# Patient Record
Sex: MALE | Race: ASIAN | NOT HISPANIC OR LATINO | Employment: OTHER | URBAN - METROPOLITAN AREA
[De-identification: names, ages, dates, MRNs, and addresses within clinical notes are randomized per-mention and may not be internally consistent; named-entity substitution may affect disease eponyms.]

---

## 2017-03-03 ENCOUNTER — APPOINTMENT (OUTPATIENT)
Dept: LAB | Facility: CLINIC | Age: 67
End: 2017-03-03
Payer: COMMERCIAL

## 2017-03-03 ENCOUNTER — TRANSCRIBE ORDERS (OUTPATIENT)
Dept: LAB | Facility: CLINIC | Age: 67
End: 2017-03-03

## 2017-03-03 DIAGNOSIS — R94.6 NONSPECIFIC ABNORMAL RESULTS OF THYROID FUNCTION STUDY: ICD-10-CM

## 2017-03-03 DIAGNOSIS — E11.9 DIABETES MELLITUS WITHOUT COMPLICATION (HCC): ICD-10-CM

## 2017-03-03 DIAGNOSIS — R94.6 NONSPECIFIC ABNORMAL RESULTS OF THYROID FUNCTION STUDY: Primary | ICD-10-CM

## 2017-03-03 LAB
ALBUMIN SERPL BCP-MCNC: 4.1 G/DL (ref 3.5–5)
ALP SERPL-CCNC: 64 U/L (ref 46–116)
ALT SERPL W P-5'-P-CCNC: 38 U/L (ref 12–78)
ANION GAP SERPL CALCULATED.3IONS-SCNC: 11 MMOL/L (ref 4–13)
AST SERPL W P-5'-P-CCNC: 31 U/L (ref 5–45)
BILIRUB SERPL-MCNC: 0.5 MG/DL (ref 0.2–1)
BUN SERPL-MCNC: 15 MG/DL (ref 5–25)
CALCIUM SERPL-MCNC: 9.8 MG/DL (ref 8.3–10.1)
CHLORIDE SERPL-SCNC: 102 MMOL/L (ref 100–108)
CHOLEST SERPL-MCNC: 192 MG/DL (ref 50–200)
CO2 SERPL-SCNC: 27 MMOL/L (ref 21–32)
CREAT SERPL-MCNC: 0.88 MG/DL (ref 0.6–1.3)
CREAT UR-MCNC: 50.6 MG/DL
EST. AVERAGE GLUCOSE BLD GHB EST-MCNC: 134 MG/DL
GFR SERPL CREATININE-BSD FRML MDRD: >60 ML/MIN/1.73SQ M
GLUCOSE SERPL-MCNC: 79 MG/DL (ref 65–140)
HBA1C MFR BLD: 6.3 % (ref 4.2–6.3)
HDLC SERPL-MCNC: 72 MG/DL (ref 40–60)
LDLC SERPL CALC-MCNC: 102 MG/DL (ref 0–100)
MICROALBUMIN UR-MCNC: 59.6 MG/L (ref 0–20)
MICROALBUMIN/CREAT 24H UR: 118 MG/G CREATININE (ref 0–30)
POTASSIUM SERPL-SCNC: 4.2 MMOL/L (ref 3.5–5.3)
PROT SERPL-MCNC: 7.7 G/DL (ref 6.4–8.2)
SODIUM SERPL-SCNC: 140 MMOL/L (ref 136–145)
T3FREE SERPL-MCNC: 2.69 PG/ML (ref 2.3–4.2)
T4 FREE SERPL-MCNC: 0.92 NG/DL (ref 0.76–1.46)
TRIGL SERPL-MCNC: 89 MG/DL
TSH SERPL DL<=0.05 MIU/L-ACNC: 6.3 UIU/ML (ref 0.36–3.74)

## 2017-03-03 PROCEDURE — 86376 MICROSOMAL ANTIBODY EACH: CPT

## 2017-03-03 PROCEDURE — 84443 ASSAY THYROID STIM HORMONE: CPT

## 2017-03-03 PROCEDURE — 36415 COLL VENOUS BLD VENIPUNCTURE: CPT

## 2017-03-03 PROCEDURE — 84481 FREE ASSAY (FT-3): CPT

## 2017-03-03 PROCEDURE — 80053 COMPREHEN METABOLIC PANEL: CPT

## 2017-03-03 PROCEDURE — 83036 HEMOGLOBIN GLYCOSYLATED A1C: CPT

## 2017-03-03 PROCEDURE — 84439 ASSAY OF FREE THYROXINE: CPT

## 2017-03-03 PROCEDURE — 82043 UR ALBUMIN QUANTITATIVE: CPT | Performed by: INTERNAL MEDICINE

## 2017-03-03 PROCEDURE — 80061 LIPID PANEL: CPT

## 2017-03-03 PROCEDURE — 82570 ASSAY OF URINE CREATININE: CPT | Performed by: INTERNAL MEDICINE

## 2017-03-04 ENCOUNTER — GENERIC CONVERSION - ENCOUNTER (OUTPATIENT)
Dept: OTHER | Facility: OTHER | Age: 67
End: 2017-03-04

## 2017-03-04 LAB — THYROPEROXIDASE AB SERPL-ACNC: 17 IU/ML (ref 0–34)

## 2017-03-05 ENCOUNTER — GENERIC CONVERSION - ENCOUNTER (OUTPATIENT)
Dept: OTHER | Facility: OTHER | Age: 67
End: 2017-03-05

## 2017-03-07 ENCOUNTER — ALLSCRIPTS OFFICE VISIT (OUTPATIENT)
Dept: OTHER | Facility: OTHER | Age: 67
End: 2017-03-07

## 2017-04-28 ENCOUNTER — APPOINTMENT (OUTPATIENT)
Dept: LAB | Facility: CLINIC | Age: 67
End: 2017-04-28
Payer: COMMERCIAL

## 2017-04-28 DIAGNOSIS — E03.9 HYPOTHYROIDISM: ICD-10-CM

## 2017-04-28 LAB — TSH SERPL DL<=0.05 MIU/L-ACNC: 3.22 UIU/ML (ref 0.36–3.74)

## 2017-04-28 PROCEDURE — 84443 ASSAY THYROID STIM HORMONE: CPT

## 2017-04-28 PROCEDURE — 36415 COLL VENOUS BLD VENIPUNCTURE: CPT

## 2017-05-01 DIAGNOSIS — E03.9 HYPOTHYROIDISM: ICD-10-CM

## 2017-05-30 ENCOUNTER — ALLSCRIPTS OFFICE VISIT (OUTPATIENT)
Dept: OTHER | Facility: OTHER | Age: 67
End: 2017-05-30

## 2017-06-07 ENCOUNTER — ALLSCRIPTS OFFICE VISIT (OUTPATIENT)
Dept: OTHER | Facility: OTHER | Age: 67
End: 2017-06-07

## 2017-07-19 ENCOUNTER — HOSPITAL ENCOUNTER (OUTPATIENT)
Dept: CT IMAGING | Facility: HOSPITAL | Age: 67
Discharge: HOME | End: 2017-07-19
Attending: INTERNAL MEDICINE
Payer: COMMERCIAL

## 2017-07-19 DIAGNOSIS — J47.9 BRONCHIECTASIS WITHOUT COMPLICATION (HCC): ICD-10-CM

## 2017-07-19 PROCEDURE — 71250 CT THORAX DX C-: CPT

## 2017-08-31 ENCOUNTER — TRANSCRIBE ORDERS (OUTPATIENT)
Dept: LAB | Facility: CLINIC | Age: 67
End: 2017-08-31

## 2017-08-31 ENCOUNTER — APPOINTMENT (OUTPATIENT)
Dept: LAB | Facility: CLINIC | Age: 67
End: 2017-08-31
Payer: COMMERCIAL

## 2017-08-31 DIAGNOSIS — E11.8 TYPE 2 DIABETES MELLITUS WITH COMPLICATION, UNSPECIFIED LONG TERM INSULIN USE STATUS: Primary | ICD-10-CM

## 2017-08-31 DIAGNOSIS — R01.1 HEART MURMUR: ICD-10-CM

## 2017-08-31 DIAGNOSIS — E11.8 TYPE 2 DIABETES MELLITUS WITH COMPLICATION, UNSPECIFIED LONG TERM INSULIN USE STATUS: ICD-10-CM

## 2017-08-31 LAB
ALBUMIN SERPL BCP-MCNC: 3.8 G/DL (ref 3.5–5)
ALP SERPL-CCNC: 55 U/L (ref 46–116)
ALT SERPL W P-5'-P-CCNC: 38 U/L (ref 12–78)
ANION GAP SERPL CALCULATED.3IONS-SCNC: 9 MMOL/L (ref 4–13)
AST SERPL W P-5'-P-CCNC: 34 U/L (ref 5–45)
BILIRUB SERPL-MCNC: 0.3 MG/DL (ref 0.2–1)
BUN SERPL-MCNC: 23 MG/DL (ref 5–25)
CALCIUM SERPL-MCNC: 9.5 MG/DL (ref 8.3–10.1)
CHLORIDE SERPL-SCNC: 104 MMOL/L (ref 100–108)
CHOLEST SERPL-MCNC: 156 MG/DL (ref 50–200)
CO2 SERPL-SCNC: 28 MMOL/L (ref 21–32)
CREAT SERPL-MCNC: 0.83 MG/DL (ref 0.6–1.3)
CREAT UR-MCNC: 107 MG/DL
EST. AVERAGE GLUCOSE BLD GHB EST-MCNC: 131 MG/DL
GFR SERPL CREATININE-BSD FRML MDRD: 90 ML/MIN/1.73SQ M
GLUCOSE P FAST SERPL-MCNC: 70 MG/DL (ref 65–99)
HBA1C MFR BLD: 6.2 % (ref 4.2–6.3)
HDLC SERPL-MCNC: 61 MG/DL (ref 40–60)
LDLC SERPL CALC-MCNC: 85 MG/DL (ref 0–100)
MICROALBUMIN UR-MCNC: 88.7 MG/L (ref 0–20)
MICROALBUMIN/CREAT 24H UR: 83 MG/G CREATININE (ref 0–30)
POTASSIUM SERPL-SCNC: 4.2 MMOL/L (ref 3.5–5.3)
PROT SERPL-MCNC: 7.5 G/DL (ref 6.4–8.2)
SODIUM SERPL-SCNC: 141 MMOL/L (ref 136–145)
TRIGL SERPL-MCNC: 50 MG/DL

## 2017-08-31 PROCEDURE — 82570 ASSAY OF URINE CREATININE: CPT | Performed by: INTERNAL MEDICINE

## 2017-08-31 PROCEDURE — 82043 UR ALBUMIN QUANTITATIVE: CPT | Performed by: INTERNAL MEDICINE

## 2017-08-31 PROCEDURE — 80053 COMPREHEN METABOLIC PANEL: CPT

## 2017-08-31 PROCEDURE — 36415 COLL VENOUS BLD VENIPUNCTURE: CPT

## 2017-08-31 PROCEDURE — 80061 LIPID PANEL: CPT

## 2017-08-31 PROCEDURE — 83036 HEMOGLOBIN GLYCOSYLATED A1C: CPT

## 2017-09-11 ENCOUNTER — ALLSCRIPTS OFFICE VISIT (OUTPATIENT)
Dept: OTHER | Facility: OTHER | Age: 67
End: 2017-09-11

## 2017-09-13 ENCOUNTER — ALLSCRIPTS OFFICE VISIT (OUTPATIENT)
Dept: OTHER | Facility: OTHER | Age: 67
End: 2017-09-13

## 2017-09-13 ENCOUNTER — TRANSCRIBE ORDERS (OUTPATIENT)
Dept: ADMINISTRATIVE | Facility: HOSPITAL | Age: 67
End: 2017-09-13

## 2017-09-13 DIAGNOSIS — J47.9 BRONCHIECTASIS WITHOUT COMPLICATION (HCC): ICD-10-CM

## 2017-09-13 DIAGNOSIS — R93.89 ABNORMAL RADIOLOGICAL FINDINGS IN SKIN AND SUBCUTANEOUS TISSUE: Primary | ICD-10-CM

## 2017-09-13 DIAGNOSIS — R93.89 ABNORMAL FINDINGS ON DIAGNOSTIC IMAGING OF OTHER SPECIFIED BODY STRUCTURES: ICD-10-CM

## 2017-09-28 ENCOUNTER — GENERIC CONVERSION - ENCOUNTER (OUTPATIENT)
Dept: OTHER | Facility: OTHER | Age: 67
End: 2017-09-28

## 2017-09-28 ENCOUNTER — HOSPITAL ENCOUNTER (OUTPATIENT)
Dept: NON INVASIVE DIAGNOSTICS | Facility: CLINIC | Age: 67
Discharge: HOME | End: 2017-09-28
Payer: COMMERCIAL

## 2017-09-28 DIAGNOSIS — R01.1 HEART MURMUR: ICD-10-CM

## 2017-09-28 PROCEDURE — 93306 TTE W/DOPPLER COMPLETE: CPT

## 2017-09-29 ENCOUNTER — ALLSCRIPTS OFFICE VISIT (OUTPATIENT)
Dept: OTHER | Facility: OTHER | Age: 67
End: 2017-09-29

## 2017-10-26 NOTE — PROGRESS NOTES
Assessment  1  DMII (diabetes mellitus, type 2) (250 00) (E11 9)   2  Hyperlipidemia (272 4) (E78 5)   3  Hypertension (401 9) (I10)   4  Hypothyroidism (244 9) (E03 9)   5  Mitral valve insufficiency (424 0) (I34 0)   6  Colon cancer screening (V76 51) (Z12 11)    Assessment plan #1 type 2 diabetes optimal control continue with LC #reducing carbohydrates and sweets will check a comprehensive metabolic panel, hemoglobin A1c, lipid panel and urine microalbumin  #2 hyperlipidemia recommend low-cholesterol diet will check a lipid profile currently optimal #3 hypertension well controlled to continue with the current medical regimen will continue to monitor #4 hypothyroidism checked her generation TSH #5 mitral valve insufficiency he does have a 3/6 systolic ejection murmur which check echocardiogram and have the patient follow up with cardiology #6 patient will receive his high-dose flu vaccine today RTO in 6 months call if any problems  Plan  Colon cancer screening    · 2 - Crissy Marx MD, Vineet Burt  Colorectal Surgery, Gastroenterology Co-Management  *  Status: Hold For  - Scheduling  Requested for: 11Sep2017  Care Summary provided  : Yes  DMII (diabetes mellitus, type 2)    · (1) COMPREHENSIVE METABOLIC PANEL; Status:Active; Requested for:11Mar2018;    · (1) HEMOGLOBIN A1C; Status:Active; Requested for:11Mar2018;    · (1) LIPID PANEL, FASTING; Status:Active; Requested for:11Mar2018;    · (1) MICROALBUMIN CREATININE RATIO, RANDOM URINE; Status:Active; Requested for:11Mar2018;    · *VB - Foot Exam; Status:Complete - Retrospective By Protocol Authorization;   Done: 57IFP5333  01:22PM  Erectile dysfunction of non-organic origin    · Cialis 20 MG Oral Tablet; TAKE 1 TABLET BY MOUTH EVERY 3 DAYS AS NEEDED  Hypothyroidism    · (Q) TSH, 3RD GENERATION; Status:Active;  Requested for:11Mar2018;   Mitral valve insufficiency    · ECHO COMPLETE WITH CONTRAST IF INDICATED; Status:Need Information - Financial Authorization; Requested for:46Abq0334;    · 1 - Parth VILLANUEAV, Gilma Carrillo  (Cardiology) Co-Management  *  Status: Active  Requested for:  38Qri7647  Care Summary provided  : Yes  Screening for colon cancer    · COLONOSCOPY; Status:Temporary Deferral - Pt refuses;   Screening for genitourinary condition    · *VB - Urinary Incontinence Screen (Dx Z13 89 Screen for UI); Status:Complete - Retrospective By  Protocol Authorization;   Done: 57NNJ0187 01:23PM    Chief Complaint  Chief Complaint Chronic Condition [facility]: Patient is here today for follow up of chronic conditions described in HPI  History of Present Illness  HPI: 57-year-old male coming in for a follow-up examination type 2 diabetes, hyperlipidemia, hypertension and hypothyroidism; the patient reports to me that He does not report any hospitalizations, no ER visits he is compliant taking his medications without untoward side effects ;blood bp 117-115/75 ; 130 -115 blood sugars no hypoglycemia ; eating carefully, very active with home activities  Review of Systems  Complete-Male:   Constitutional: No fever or chills, feels well, no tiredness, no recent weight gain or weight loss  Cardiovascular: No complaints of slow heart rate, no fast heart rate, no chest pain, no palpitations, no leg claudication, no lower extremity  Respiratory: No complaints of shortness of breath, no wheezing, no cough, no SOB on exertion, no orthopnea or PND  Gastrointestinal: No complaints of abdominal pain, no constipation, no nausea or vomiting, no diarrhea or bloody stools  Genitourinary: No complaints of dysuria, no incontinence, no hesitancy, no nocturia, no genital lesion, no testicular pain  Preventive Quality 65 Older:   Preventive Quality 65 and Older: Falls Risk: The patient fell 0 times in the past 12 months  The patient currently has no urinary incontinence symptoms  ROS Reviewed:   ROS reviewed        Active Problems  1  3-vessel coronary artery disease (414 00) (I25 10)   2  Anxiety (300 00) (F41 9)   3  Bronchiectasis without complication (596 4) (A56 8)   4  CABG   5  Cardiac murmur (785 2) (R01 1)   6  Cataract (366 9) (H26 9)   7  Contact dermatitis (692 9) (L25 9)   8  Cough (786 2) (R05)   9  DMII (diabetes mellitus, type 2) (250 00) (E11 9)   10  Elevated TSH (794 5) (R94 6)   11  Erectile dysfunction of non-organic origin (302 72) (F52 21)   12  Gait disturbance (781 2) (R26 9)   13  Gall bladder polyp (575 6) (K82 4)   14  Hyperlipidemia (272 4) (E78 5)   15  Hypertension (401 9) (I10)   16  Hypothyroidism (244 9) (E03 9)   17  Microalbuminuria (791 0) (R80 9)   18  Mitral valve insufficiency (424 0) (I34 0)   19  Need for immunization against influenza (V04 81) (Z23)   20  Needs flu shot (V04 81) (Z23)   21  Post-polio syndrome (138) (G14)   22  Thoracic back pain (724 1) (M54 6)   23  Type 2 diabetes mellitus, controlled, 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)    Past Medical History  1  History of Community acquired pneumonia (5) (J18 9)   2  History of backache (V13 59) (Z87 39)   3  History of Methicillin Resistant Staphylococcus Aureus Infection (V12 04)   4  History of Nonspecific abnormal results of function study of thyroid (794 5) (R94 6)    Surgical History  1  CABG   2  History of Heart Surgery  Surgical History Reviewed: The surgical history was reviewed and updated today  Family History  Mother    1  No pertinent family history  Family History    2  Family history of Diabetes Mellitus (V18 0)  Family History Reviewed: The family history was reviewed and updated today  Social History   · Denied: History of Alcohol Use (History)   · Denied: History of Drug Use   · Exercising Regularly   · Former smoker (V15 82) (K19 629)   · Living With Significant Other   ·    · Never A Smoker   · Retired From Work  Social History Reviewed: The social history was reviewed and updated today   The social history was reviewed and is unchanged  Current Meds   1  Aspirin  MG Oral Tablet Delayed Release; Take 1 tablet daily; Therapy: 91AFY3341 to (Evaluate:44Tkv2932); Last CA:19CGZ1149 Ordered   2  Basaglar KwikPen 100 UNIT/ML Subcutaneous Solution Pen-injector; inject 20 units before bedtime; Therapy: 10QDQ6111 to (Evaluate:91Urq7239)  Requested for: 70INU3328; Last Rx:39Son6060   Ordered   3  Pastor Contour Test In Vitro Strip; TEST ONCE DAILY; Therapy: 35FUM3979 to (Evaluate:87Vzu5160)  Requested for: 47RWH5685; Last Rx:19Kjl7759   Ordered   4  BD Pen Needle Mini U/F 31G X 5 MM Miscellaneous; use with lantus solostar as directed; Therapy: 58HWZ8258 to (Last Rx:74Yxn0924)  Requested for: 87Icu6862 Ordered   5  Benzonatate 100 MG Oral Capsule; TAKE 1 CAPSULE 3 TIMES DAILY AS NEEDED; Therapy: 65VXN3235 to (Bettye Nair)  Requested for: 30Slp8751; Last Rx:44Ujo1551   Ordered   6  Cialis 20 MG Oral Tablet; TAKE 1 TABLET BY MOUTH EVERY 3 DAYS AS NEEDED; Therapy: 89BKI5558 to (Jojo Roldan)  Requested for: 09Vyi5861; Last Rx:84Cja5349   Ordered   7  DiazePAM 5 MG Oral Tablet; TAKE 1 TABLET 3 TIMES DAILY AS NEEDED; Last Rx:22Nov2016   Ordered   8  DilTIAZem HCl  MG Oral Capsule Extended Release 24 Hour; take 1 capsule daily; Therapy: 13Szt8861 to (Evaluate:25Ajj0261)  Requested for: 35UNT6548; Last Rx:62Dty5795   Ordered   9  DilTIAZem HCl ER Coated Beads 240 MG Oral Capsule Extended Release 24 Hour; TAKE ONE   CAPSULE BY MOUTH EVERY DAY; Therapy: 87WCT6572 to (Jojo Roldan)  Requested for: 97KOF8730; Last Rx:61Ysg9401   Ordered   10  Fexofenadine HCl - 180 MG Oral Tablet; TAKE 1 TABLET DAILY AS NEEDED FOR ALLERGIES; Therapy: 21QDC5183 to (Evaluate:98Lul7946)  Requested for: 40KHT8550; Last Rx:97Iag8484    Ordered   11  Fish Oil 1000 MG Oral Capsule; take 1 capsule daily; Therapy: (Recorded:44Snv5165) to Recorded   12  Levothyroxine Sodium 25 MCG Oral Tablet; TAKE 1 TABLET DAILY;     Therapy: 11YBS8161 to (HGNSRXYT:27MPT3931)  Requested for: 58HZO3318; Last Rx:67Qav8246    Ordered   13  Losartan Potassium 50 MG Oral Tablet; TAKE 1 TABLET DAILY; Therapy: 58YUC2104 to (26 126310)  Requested for: 02Plh0790; Last Rx:01Mar2016;    Status: ACTIVE - Renewal Denied Ordered   14  Losartan Potassium 50 MG Oral Tablet; Take 1 tablet daily; Therapy: 75Fnb6376 to (Evaluate:78Sgf3322)  Requested for: 56WJU8662; Last Rx:52Vml9031    Ordered   15  MetFORMIN HCl  MG Oral Tablet Extended Release 24 Hour; take 1 tablet by mouth twice a    day; Therapy: 23VNW1996 to (Evaluate:50Fzv0238)  Requested for: 32SEG3918; Last Rx:05Jan2017    Ordered   16  PredniSONE 5 MG Oral Tablet; 4 tablets per day x3 days,3 tablets a day x2 days, 2 tablets a day x2    days and one tablet a day for one day; Therapy: 58KIT0698 to (Evaluate:22Jun2017)  Requested for: 12Jun2017; Last Rx:12Jun2017    Ordered   17  Simvastatin 40 MG Oral Tablet; TD AFTER EVENING MEAL; Therapy: 05FUU9515 to (Evaluate:17Nov2017)  Requested for: 77IUE2401; Last Rx:22Nov2016    Ordered   18  Vitamin B Complex Oral Tablet; TAKE 1 TABLET DAILY; Therapy: (Recorded:11Sep2017) to Recorded   19  Vitamin C 500 MG Oral Capsule; take 1 capsule daily; Therapy: (Recorded:11Sep2017) to Recorded  Medication List Reviewed: The medication list was reviewed and updated today  Allergies  1  atorvastatin   2  Levaquin TABS   3  Pravachol TABS    Vitals  Vital Signs    Recorded: 11Sep2017 01:19PM   Heart Rate 70   Respiration 16   Systolic 693, RUE, Sitting   Diastolic 68, RUE, Sitting   Height 5 ft 4 in   Weight 117 lb 0 2 oz   BMI Calculated 20 09   BSA Calculated 1 56   O2 Saturation 92     Physical Exam    Constitutional   General appearance: No acute distress, well appearing and well nourished  Eyes   Conjunctiva and lids: No swelling, erythema, or discharge  Pupils and irises: Equal, round and reactive to light      Ears, Nose, Mouth, and Throat   External inspection of ears and nose: Normal     Otoscopic examination: Tympanic membrance translucent with normal light reflex  Canals patent without erythema  Nasal mucosa, septum, and turbinates: Normal without edema or erythema  Oropharynx: Normal with no erythema, edema, exudate or lesions  Pulmonary   Respiratory effort: No increased work of breathing or signs of respiratory distress  Auscultation of lungs: Clear to auscultation, equal breath sounds bilaterally, no wheezes, no rales, no rhonci  Cardiovascular   Auscultation of heart: Abnormal   A grade 3 systolic murmur was heard at the LLSB  Examination of extremities for edema and/or varicosities: Normal     Psychiatric   Mood and affect: Normal          Results/Data  Falls Risk Assessment (Dx Z13 89 Screen for Neurologic Disorder) 14SNC4832 01:24PM User, Ahs     Test Name Result Flag Reference   Falls Risk      No falls in the past year     *VB - Urinary Incontinence Screen (Dx Z13 89 Screen for UI) 88SNT4320 01:23PM [place]     Test Name Result Flag Reference   Urinary Incontinence Assessment 46Bfy1514       *VB - Foot Exam 21Kby8674 01:22PM [place]     Test Name Result Flag Reference   FOOT EXAM 51GIE2070       (1) COMPREHENSIVE METABOLIC PANEL 60CBJ9095 52:31PQ [place]     Test Name Result Flag Reference   SODIUM 141 mmol/L  136-145   POTASSIUM 4 2 mmol/L  3 5-5 3   CHLORIDE 104 mmol/L  100-108   CARBON DIOXIDE 28 mmol/L  21-32   ANION GAP (CALC) 9 mmol/L  4-13   BLOOD UREA NITROGEN 23 mg/dL  5-25   CREATININE 0 83 mg/dL  0 60-1 30   Standardized to IDMS reference method   CALCIUM 9 5 mg/dL  8 3-10 1   BILI, TOTAL 0 30 mg/dL  0 20-1 00   ALK PHOSPHATAS 55 U/L     ALT (SGPT) 38 U/L  12-78   Specimen collection should occur prior to Sulfasalazine administration due to the potential for falsely depressed results     AST(SGOT) 34 U/L  5-45   Specimen collection should occur prior to Sulfasalazine administration due to the potential for falsely depressed results  ALBUMIN 3 8 g/dL  3 5-5 0   TOTAL PROTEIN 7 5 g/dL  6 4-8 2   eGFR 90 ml/min/1 73sq m     National Kidney Disease Education Program recommendations are as follows:  GFR calculation is accurate only with a steady state creatinine  Chronic Kidney disease less than 60 ml/min/1 73 sq  meters  Kidney failure less than 15 ml/min/1 73 sq  meters  GLUCOSE FASTING 70 mg/dL  65-99   Specimen collection should occur prior to Sulfasalazine administration due to the potential for falsely depressed results  Specimen collection should occur prior to Sulfapyridine administration due to the potential for falsely elevated results  (1) LIPID PANEL, FASTING 75Vjd2238 12:51PM [facility] [place]     Test Name Result Flag Reference   CHOLESTEROL 156 mg/dL     HDL,DIRECT 61 mg/dL H 40-60   Specimen collection should occur prior to Metamizole administration due to the potential for falsley depressed results  LDL CHOLESTEROL CALCULATED 85 mg/dL  0-100   This is a patient instruction: This is a fasting test  Water, black tea or black coffee only after 9:00pm the night before the test  Drink 2 glasses of water the morning of the test         Triglyceride:        Normal ??? ??? ??? ??? ??? ??? ??? <150 mg/dl   ??? ??? ???Borderline High ??? ??? 150-199 mg/dl   ??? ??? ? ?? High ??? ??? ??? ??? ??? ??? ??? 200-499 mg/dl   ??? ??? ? ??Very High ??? ??? ??? ??? ??? >499 mg/dl      Cholesterol:       Desirable ??? ??? ??? ??? <200 mg/dl   ??? ??? Borderline High ??? 200-239 mg/dl   ??? ??? High ??? ??? ??? ??? ??? ??? >239 mg/dl      HDL Cholesterol:       High ??? ???>59 mg/dL   ??? ??? Low ??? ??? <41 mg/dL      This screening LDL is a calculated result  It does not have the accuracy of the Direct Measured LDL in the monitoring of patients with hyperlipidemia and/or statin therapy     Direct Measure LDL (NIU673) must be ordered separately in these patients  TRIGLYCERIDES 50 mg/dL  <=150   Specimen collection should occur prior to N-Acetylcysteine or Metamizole administration due to the potential for falsely depressed results  (1) MICROALBUMIN CREATININE RATIO, RANDOM URINE 86Skf8125 12:51PM [facility]     Test Name Result Flag Reference   MICROALBUMIN/ CREAT R 83 mg/g creatinine H 0-30   MICROALBUMIN,URINE 88 7 mg/L H 0 0-20 0   CREATININE URINE 107 0 mg/dL       (1) HEMOGLOBIN A1C 90Eih4106 12:51PM [facility]     Test Name Result Flag Reference   HEMOGLOBIN A1C 6 2 %  4 2-6 3   EST  AVG  GLUCOSE 131 mg/dl       * CT CHEST WO CONTRAST 28CUA8603 01:59PM [facility] Order Number: [id number]    - Patient Instructions: [facility]     Test Name Result Flag Reference   CT CHEST WO CONTRAST (Report)     CT CHEST WITHOUT IV CONTRAST     INDICATION: Bronchiectasis  COMPARISON: July 25, 2016     TECHNIQUE: CT examination of the chest was performed without intravenous contrast  Reformatted images were created in axial, sagittal, and coronal planes  Radiation dose length product (DLP) for this visit: 137 mGy-cm   This examination, like all CT scans performed in the [facility], was performed utilizing techniques to minimize radiation dose exposure, including the use of iterative    reconstruction and automated exposure control  FINDINGS:     LUNGS: There has been slight interval progression of the groundglass opacities within the left upper lobe and left lower lobe  Bronchiectasis is again noted within the left lung base with increased reticulation seen in this region   Right lung remains    stable there is no significant bronchiectasis within the right hemithorax  There is no endobronchial lesion  PLEURA: Unremarkable  HEART/GREAT VESSELS: Patient status post CABG  MEDIASTINUM AND CHLOÉ: Hiatal hernia is present  CHEST WALL AND LOWER NECK: Unremarkable       VISUALIZED STRUCTURES IN THE UPPER ABDOMEN: Unremarkable  OSSEOUS STRUCTURES: No acute fracture  No destructive osseous lesion  IMPRESSION:     Slight interval progression of the groundglass opacities within the left upper and left lower lobe suggesting active alveolitis       Persistent left basilar bronchiectasis and interstitial reticulation represent sequela from chronic infection       Workstation performed: [id number]     Signed by:   Opal Majano MD   7/24/17     Future Appointments    Date/Time Provider Specialty Site   09/13/2017 02:00 PM Kurtis Gutierres DO Pulmonary Medicine [address]     Signatures   Electronically signed by : Seamus Vazquez DO; Sep 11 2017  2:31PM EST                       (Author)

## 2017-10-27 NOTE — PROGRESS NOTES
Assessment  Assessed    1  3-vessel coronary artery disease (414 00) (I25 10)   2  CABG   3  Mitral valve insufficiency (424 0) (I34 0)   4  Hyperlipidemia (272 4) (E78 5)   5  Hypertension (401 9) (I10)    Plan  Anxiety    · DiazePAM 5 MG Oral Tablet; TAKE 1 TABLET 3 TIMES DAILY AS NEEDED   Rx By: Josh Gann; Dispense: 30 Days ; #:90 Tablet; Refill: 0;For: Anxiety; MARIBELL = N; Print Rx  Hypertension    · Follow-up visit in 6 months Evaluation and Treatment  Follow-up  Status: Hold For -  Scheduling  Requested for: 92OYL3275   Ordered; For: Hypertension; Ordered By: Josh Gann Performed:  Due: 91TRZ3709    Discussion/Summary  Cardiology Discussion Summary Free Text Note Form [facility]:   74year old man with coronary artery disease, hypertension, and dyslipidemia presents for follow up  Asymptomatic from a cardiac standpoint  No chest pain, shortness of breath, or palpitations  No chest pain, shortness of breath, or palpitations  Echocardiogram 9/28/17 demonstrated normal left ventricular systolic function and mild mitral regurgitation - no change  Coronary artery disease s/p CABG - stable  Hypertension - good control  Mild to moderate mitral regurgitation - stable  Actually mitral regurgitation is improved from last time  Dyslipidemia - on statin  Continue current medications  Follow up in 6 months  Chief Complaint  Chief Complaint Free Text Note Form: Patient is here today for f/u per pcp and had recent ECHO  Patient c/o palpations when playing violin  Chief Complaint Chronic Condition [facility]: Patient is here today for follow up of chronic conditions described in HPI  History of Present Illness  Cardiology HPI Free Text Note Form [facility]: Mr Elena Jiménez is a 74year old man with coronary artery disease, hypertension, and dyslipidemia presents for follow up  Asymptomatic from a cardiac standpoint  No chest pain, shortness of breath, or palpitations  No chest pain, shortness of breath, or palpitations  Echocardiogram 9/28/17 demonstrated normal left ventricular systolic function and mild mitral regurgitation - no change  Review of Systems  Cardiology Male ROS:     Cardiac: No complaints of chest pain, no palpitations, no fainiting  Skin: No complaints of nonhealing sores or skin rash  Genitourinary: No complaints of recurrent urinary tract infections, frequent urination at night, difficult urination, blood in urine, kidney stones, loss of bladder control, no kidney or prostate problems, no erectile dysfunction  Psychological: No complaints of feeling depressed, anxiety, panic attacks, or difficulty concentrating  General: No complaints of trouble sleeping, lack of energy, fatigue, appetite changes, weight changes, fever, frequent infections, or night sweats  Respiratory: No complaints of shortness of breath, cough with sputum, or wheezing  HEENT: No complaints of serious problems, hearing problems, nose problems, throat problems, or snoring  Gastrointestinal: No complaints of liver problems, nausea, vomiting, heartburn, constipation, bloody stools, diarrhea, problems swallowing, adbominal pain, or rectal bleeding  Hematologic: No complaints of bleeding disorders, anemia, blood clots, or excessive brusing  Neurological: No complaints of numbness, tingling, dizziness, weakness, seizures, headaches, syncope or fainting, AM fatigue, daytime sleepiness, no witnessed apnea episodes  Musculoskeletal: No complaints of arthritis, back pain, or painfull swelling  Active Problems  Problems    1  3-vessel coronary artery disease (414 00) (I25 10)   2  Abnormal chest CT (793 2) (R93 8)   3  Anxiety (300 00) (F41 9)   4  Bronchiectasis without complication (589 1) (M88 6)   5  CABG   6  Cardiac murmur (785 2) (R01 1)   7  Cataract (366 9) (H26 9)   8  Colon cancer screening (V76 51) (Z12 11)   9  Contact dermatitis (692 9) (L25 9)   10  Cough (786 2) (R05)   11   DMII (diabetes mellitus, type 2) (250 00) (E11 9)   12  Elevated TSH (794 5) (R94 6)   13  Erectile dysfunction of non-organic origin (302 72) (F52 21)   14  Gait disturbance (781 2) (R26 9)   15  Gall bladder polyp (575 6) (K82 4)   16  Hyperlipidemia (272 4) (E78 5)   17  Hypertension (401 9) (I10)   18  Hypothyroidism (244 9) (E03 9)   19  Microalbuminuria (791 0) (R80 9)   20  Mitral valve insufficiency (424 0) (I34 0)   21  Need for immunization against influenza (V04 81) (Z23)   22  Needs flu shot (V04 81) (Z23)   23  Post-polio syndrome (138) (G14)   24  Screening for colon cancer (V76 51) (Z12 11)   25  Screening for genitourinary condition (V81 6) (Z13 89)   26  Thoracic back pain (724 1) (M54 6)   27  Type 2 diabetes mellitus, controlled, 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)    Past Medical History  Problems    1  History of Community acquired pneumonia (5) (J18 9)   2  History of backache (V13 59) (Z87 39)   3  History of Methicillin Resistant Staphylococcus Aureus Infection (V12 04)   4  History of Nonspecific abnormal results of function study of thyroid (794 5) (R94 6)  Active Problems And Past Medical History Reviewed: The active problems and past medical history were reviewed and updated today  Surgical History  Problems    1  CABG   2  History of Heart Surgery  Surgical History Reviewed: The surgical history was reviewed and updated today  Family History  Mother    1  No pertinent family history  Family History    2  Family history of Diabetes Mellitus (V18 0)  Family History Reviewed: The family history was reviewed and updated today  Social History  Problems    · Denied: History of Alcohol Use (History)   · Denied: History of Drug Use   · Exercising Regularly   · Former smoker (T43 33) (S23 398)   · Living With Significant Other   ·    · Retired From Work  Social History Reviewed: The social history was reviewed and updated today  The social history was reviewed and is unchanged        Current Meds   1  Aspirin  MG Oral Tablet Delayed Release; Take 1 tablet daily; Therapy: 70FAR0097 to (Evaluate:47Vxf0026); Last YE:30KTU9582 Ordered   2  Basaglar KwikPen 100 UNIT/ML Subcutaneous Solution Pen-injector; inject 20 units   before bedtime; Therapy: 53BCG4708 to (Evaluate:00Rhc6638)  Requested for: 12HME6278; Last   Rx:98Guj5358 Ordered   3  Pastor Contour Test In Vitro Strip; TEST ONCE DAILY; Therapy: 52WYQ0469 to (Evaluate:24Aib4273)  Requested for: 68KUY1172; Last   Rx:85Ebx5786 Ordered   4  BD Pen Needle Mini U/F 31G X 5 MM Miscellaneous; use with lantus solostar as   directed; Therapy: 69VKU5799 to (Last Rx:01Dzf7346)  Requested for: 16Nkn2706 Ordered   5  Benzonatate 100 MG Oral Capsule; TAKE 1 CAPSULE 3 TIMES DAILY AS NEEDED; Therapy: 15NAK3436 to (Gorge Lynn)  Requested for: 04Cll6891; Last   Rx:02Kvy2262 Ordered   6  Cialis 20 MG Oral Tablet; TAKE 1 TABLET BY MOUTH EVERY 3 DAYS AS NEEDED; Therapy: 50KQV7853 to (03 17 74 30 53)  Requested for: 72Eus5386; Last   Rx:98Rgg3960 Ordered   7  DiazePAM 5 MG Oral Tablet; TAKE 1 TABLET 3 TIMES DAILY AS NEEDED; Last   Rx:23Rga7933 Ordered   8  DilTIAZem HCl  MG Oral Capsule Extended Release 24 Hour; take 1 capsule   daily; Therapy: 35Qtd4300 to (Evaluate:37Mqq8886)  Requested for: 17HMU8731; Last   Rx:43Eya3624 Ordered   9  DilTIAZem HCl ER Coated Beads 240 MG Oral Capsule Extended Release 24 Hour;   TAKE ONE CAPSULE BY MOUTH EVERY DAY; Therapy: 20RNO5283 to (Davis Thomson)  Requested for: 78OJG7696; Last   Rx:81Wod7006 Ordered   10  Fexofenadine HCl - 180 MG Oral Tablet; TAKE 1 TABLET DAILY AS NEEDED FOR    ALLERGIES; Therapy: 57QNC5812 to (Evaluate:79Jcx4389)  Requested for: 43RBX4011; Last    Rx:23Hkm0222 Ordered   11  Fish Oil 1000 MG Oral Capsule; take 1 capsule daily; Therapy: (Recorded:11Sep2017) to Recorded   12  Levothyroxine Sodium 25 MCG Oral Tablet; TAKE 1 TABLET DAILY;     Therapy: 01TPA0958 to (CXESXGFF:77CNF1538)  Requested for: 06AQK0180; Last    Rx:39Fkt9733 Ordered   13  Losartan Potassium 50 MG Oral Tablet; TAKE 1 TABLET DAILY; Therapy: 21IHR5003 to (Humphreys Patience)  Requested for: 63Lwd3427; Last    Rx:01Mar2016; Status: ACTIVE - Renewal Denied Ordered   14  Losartan Potassium 50 MG Oral Tablet; Take 1 tablet daily; Therapy: 20Vod9071 to (Evaluate:42Buo0863)  Requested for: 21GKP8102; Last    Rx:12Xsd5382 Ordered   15  MetFORMIN HCl  MG Oral Tablet Extended Release 24 Hour; take 1 tablet by    mouth twice a day; Therapy: 84GPO0186 to (Evaluate:12Bms9289)  Requested for: 95NDJ1544; Last    Rx:05Jan2017 Ordered   16  Simvastatin 40 MG Oral Tablet; TD AFTER EVENING MEAL; Therapy: 08PLX8981 to (Evaluate:17Nov2017)  Requested for: 71NHP3132; Last    Rx:22Nov2016 Ordered   17  Vitamin B Complex Oral Tablet; TAKE 1 TABLET DAILY; Therapy: (Recorded:07Nbr7763) to Recorded   18  Vitamin C 500 MG Oral Capsule; take 1 capsule daily; Therapy: (Recorded:39Brm9321) to Recorded  Medication List Reviewed: The medication list was reviewed and updated today  Allergies  Medication    1  atorvastatin   2  Levaquin TABS   3  Pravachol TABS    Vitals  Vital Signs    Recorded: 75PQQ8674 11:17AM   Heart Rate 81   Systolic 874, LUE, Sitting   Diastolic 70, LUE, Sitting   Height 5 ft 4 in   Weight 116 lb 5 oz   BMI Calculated 19 96   BSA Calculated 1 55     Physical Exam    Constitutional   General appearance: No acute distress, well appearing and well nourished  Eyes   Conjunctiva and Sclera examination: Conjunctiva pink, sclera anicteric  Ears, Nose, Mouth, and Throat - External inspection of ears and nose: Normal without deformities or discharge  Neck   Neck and thyroid: Normal, supple, trachea midline, no thyromegaly  Pulmonary   Respiratory effort: No increased work of breathing or signs of respiratory distress      Cardiovascular   Auscultation of heart: Abnormal   A grade 2 systolic murmur was heard at the RUSB  Carotid pulses: Normal, 2+ bilaterally  Examination of extremities for edema and/or varicosities: Normal     Chest - Chest: Normal    Abdomen   Abdomen: Non-tender and no distention  Musculoskeletal Gait and station: Normal gait  Skin - Skin and subcutaneous tissue: Normal without rashes or lesions  Skin is warm and well perfused, normal turgor  Neurologic - Cranial nerves: II - XII intact     Psychiatric - Orientation to person, place, and time: Normal -- Mood and affect: Normal       Future Appointments    Date/Time Provider Specialty Site   04/13/2018 01:00 PM Jacklyn Gayle DO [facility]     Signatures   Electronically signed by : ASAEL Glez ; Sep 29 2017 11:29AM EST                       (Author)

## 2017-12-11 ENCOUNTER — TRANSCRIBE ORDERS (OUTPATIENT)
Dept: ADMINISTRATIVE | Facility: HOSPITAL | Age: 67
End: 2017-12-11

## 2017-12-18 ENCOUNTER — HOSPITAL ENCOUNTER (OUTPATIENT)
Dept: CT IMAGING | Facility: HOSPITAL | Age: 67
Discharge: HOME | End: 2017-12-18
Attending: INTERNAL MEDICINE
Payer: COMMERCIAL

## 2017-12-18 DIAGNOSIS — J47.9 BRONCHIECTASIS WITHOUT COMPLICATION (HCC): ICD-10-CM

## 2017-12-18 DIAGNOSIS — R93.89 ABNORMAL FINDINGS ON DIAGNOSTIC IMAGING OF OTHER SPECIFIED BODY STRUCTURES: ICD-10-CM

## 2017-12-18 PROCEDURE — 71250 CT THORAX DX C-: CPT

## 2018-01-02 ENCOUNTER — GENERIC CONVERSION - ENCOUNTER (OUTPATIENT)
Dept: OTHER | Facility: OTHER | Age: 68
End: 2018-01-02

## 2018-01-11 NOTE — RESULT NOTES
Message   notify the pt normal Thyroid microsomal antibody f/u as scheduled        Verified Results  (1) THYROID MICROSOMAL ANTIBODY 78KJI8143 11:19AM Addison Flanagan     Test Name Result Flag Reference   [facility] MICROSOM AB 17 IU/mL  0 - 34   Performed at:  [address]  [id number]  : Sparkle Shell MD, Phone:  [phone]       Signatures   Electronically signed by : Sally Gurrola DO; Mar  5 2017  9:32AM EST                       (Author)

## 2018-01-12 VITALS
SYSTOLIC BLOOD PRESSURE: 132 MMHG | WEIGHT: 116.31 LBS | DIASTOLIC BLOOD PRESSURE: 70 MMHG | BODY MASS INDEX: 19.86 KG/M2 | HEART RATE: 81 BPM | HEIGHT: 64 IN

## 2018-01-12 NOTE — CONSULTS
I had the pleasure of evaluating your patient, Rekha Sanchez  My full evaluation follows:      Chief Complaint  Patient is here today for f/u per pcp and had recent ECHO  Patient c/o palpations when playing violin  Patient is here today for follow up of chronic conditions described in HPI  History of Present Illness  Mr Elena Jiménez is a 74year old man with coronary artery disease, hypertension, and dyslipidemia presents for follow up  Asymptomatic from a cardiac standpoint  No chest pain, shortness of breath, or palpitations  No chest pain, shortness of breath, or palpitations  Echocardiogram 9/28/17 demonstrated normal left ventricular systolic function and mild mitral regurgitation - no change  Review of Systems      Cardiac: No complaints of chest pain, no palpitations, no fainiting  Skin: No complaints of nonhealing sores or skin rash  Genitourinary: No complaints of recurrent urinary tract infections, frequent urination at night, difficult urination, blood in urine, kidney stones, loss of bladder control, no kidney or prostate problems, no erectile dysfunction  Psychological: No complaints of feeling depressed, anxiety, panic attacks, or difficulty concentrating  General: No complaints of trouble sleeping, lack of energy, fatigue, appetite changes, weight changes, fever, frequent infections, or night sweats  Respiratory: No complaints of shortness of breath, cough with sputum, or wheezing  HEENT: No complaints of serious problems, hearing problems, nose problems, throat problems, or snoring  Gastrointestinal: No complaints of liver problems, nausea, vomiting, heartburn, constipation, bloody stools, diarrhea, problems swallowing, adbominal pain, or rectal bleeding  Hematologic: No complaints of bleeding disorders, anemia, blood clots, or excessive brusing     Neurological: No complaints of numbness, tingling, dizziness, weakness, seizures, headaches, syncope or fainting, AM fatigue, daytime sleepiness, no witnessed apnea episodes  Musculoskeletal: No complaints of arthritis, back pain, or painfull swelling  Active Problems    1  3-vessel coronary artery disease (414 00) (I25 10)   2  Abnormal chest CT (793 2) (R93 8)   3  Anxiety (300 00) (F41 9)   4  Bronchiectasis without complication (406 0) (J63 4)   5  CABG   6  Cardiac murmur (785 2) (R01 1)   7  Cataract (366 9) (H26 9)   8  Colon cancer screening (V76 51) (Z12 11)   9  Contact dermatitis (692 9) (L25 9)   10  Cough (786 2) (R05)   11  DMII (diabetes mellitus, type 2) (250 00) (E11 9)   12  Elevated TSH (794 5) (R94 6)   13  Erectile dysfunction of non-organic origin (302 72) (F52 21)   14  Gait disturbance (781 2) (R26 9)   15  Gall bladder polyp (575 6) (K82 4)   16  Hyperlipidemia (272 4) (E78 5)   17  Hypertension (401 9) (I10)   18  Hypothyroidism (244 9) (E03 9)   19  Microalbuminuria (791 0) (R80 9)   20  Mitral valve insufficiency (424 0) (I34 0)   21  Need for immunization against influenza (V04 81) (Z23)   22  Needs flu shot (V04 81) (Z23)   23  Post-polio syndrome (138) (G14)   24  Screening for colon cancer (V76 51) (Z12 11)   25  Screening for genitourinary condition (V81 6) (Z13 89)   26  Thoracic back pain (724 1) (M54 6)   27  Type 2 diabetes mellitus, controlled, with renal complications (874 73) (L38 15)    Past Medical History    · History of Community acquired pneumonia (486) (J18 9)   · History of backache (V13 59) (Z87 39)   · History of Methicillin Resistant Staphylococcus Aureus Infection (V12 04)   · History of Nonspecific abnormal results of function study of thyroid (794 5) (R94 6)    The active problems and past medical history were reviewed and updated today  Surgical History    · CABG   · History of Heart Surgery    The surgical history was reviewed and updated today         Family History    · No pertinent family history    · Family history of Diabetes Mellitus (V18 0)    The family history was reviewed and updated today  Social History    · Denied: History of Alcohol Use (History)   · Denied: History of Drug Use   · Exercising Regularly   · Former smoker (O68 30) (G23 441)   · Living With Significant Other   ·    · Retired From Work  The social history was reviewed and updated today  The social history was reviewed and is unchanged  Current Meds   1  Aspirin  MG Oral Tablet Delayed Release; Take 1 tablet daily; Therapy: 70UOI4417 to (Evaluate:31Len5928); Last VK:33KTQ1186 Ordered   2  Basaglar KwikPen 100 UNIT/ML Subcutaneous Solution Pen-injector; inject 20 units   before bedtime; Therapy: 86ZZS4818 to (Evaluate:89Yti4176)  Requested for: 61NZE8632; Last   Rx:31Nit9907 Ordered   3  Pastor Contour Test In Vitro Strip; TEST ONCE DAILY; Therapy: 52TDY0369 to (Evaluate:67Aab7897)  Requested for: 83OMY3228; Last   Rx:85Pib0177 Ordered   4  BD Pen Needle Mini U/F 31G X 5 MM Miscellaneous; use with lantus solostar as   directed; Therapy: 48KSB2150 to (Last Rx:90Mnz8186)  Requested for: 71Fus4916 Ordered   5  Benzonatate 100 MG Oral Capsule; TAKE 1 CAPSULE 3 TIMES DAILY AS NEEDED; Therapy: 69VCO7638 to (Nakul Das)  Requested for: 18Vgc7761; Last   Rx:34Ovk1490 Ordered   6  Cialis 20 MG Oral Tablet; TAKE 1 TABLET BY MOUTH EVERY 3 DAYS AS NEEDED; Therapy: 70URK0549 to (768 108 173)  Requested for: 97Wab9396; Last   Rx:14Cir5901 Ordered   7  DiazePAM 5 MG Oral Tablet; TAKE 1 TABLET 3 TIMES DAILY AS NEEDED; Last   Rx:28Fez8678 Ordered   8  DilTIAZem HCl  MG Oral Capsule Extended Release 24 Hour; take 1 capsule   daily; Therapy: 02Bgq8538 to (Evaluate:73Kzn3865)  Requested for: 31OPW7652; Last   Rx:95Ooh2440 Ordered   9  DilTIAZem HCl ER Coated Beads 240 MG Oral Capsule Extended Release 24 Hour;   TAKE ONE CAPSULE BY MOUTH EVERY DAY; Therapy: 45WGO4346 to (Kanchan Jolley)  Requested for: 89KGD9329; Last   Rx:22Hxd1147 Ordered   10   Fexofenadine HCl - 180 MG Oral Tablet; TAKE 1 TABLET DAILY AS NEEDED FOR    ALLERGIES; Therapy: 98BMJ2468 to (Evaluate:73Oke1973)  Requested for: 83DJX0652; Last    Rx:71Obw4966 Ordered   11  Fish Oil 1000 MG Oral Capsule; take 1 capsule daily; Therapy: (Recorded:34Uec1954) to Recorded   12  Levothyroxine Sodium 25 MCG Oral Tablet; TAKE 1 TABLET DAILY; Therapy: 90EOZ0671 to (WWDFROXC:26PGF3678)  Requested for: 19MOT9373; Last    Rx:22Yje2913 Ordered   13  Losartan Potassium 50 MG Oral Tablet; TAKE 1 TABLET DAILY; Therapy: 94ZIF4780 to (Gunner Azar)  Requested for: 91Pvp4034; Last    Rx:01Mar2016; Status: ACTIVE - Renewal Denied Ordered   14  Losartan Potassium 50 MG Oral Tablet; Take 1 tablet daily; Therapy: 83Inf7454 to (Evaluate:20Jdx8794)  Requested for: 43WBU9539; Last    Rx:17Bij4293 Ordered   15  MetFORMIN HCl  MG Oral Tablet Extended Release 24 Hour; take 1 tablet by    mouth twice a day; Therapy: 41DGP5225 to (Evaluate:86Ywx1886)  Requested for: 12WRH2672; Last    Rx:05Jan2017 Ordered   16  Simvastatin 40 MG Oral Tablet; TD AFTER EVENING MEAL; Therapy: 04HLQ2891 to (Evaluate:17Nov2017)  Requested for: 93QPX5158; Last    Rx:22Nov2016 Ordered   17  Vitamin B Complex Oral Tablet; TAKE 1 TABLET DAILY; Therapy: (Recorded:60Xvy9904) to Recorded   18  Vitamin C 500 MG Oral Capsule; take 1 capsule daily; Therapy: (Recorded:68Gjg2743) to Recorded    The medication list was reviewed and updated today  Allergies    1  atorvastatin   2  Levaquin TABS   3  Pravachol TABS    Vitals   Recorded: 45NYP3991 11:17AM   Heart Rate 81   Systolic 607, LUE, Sitting   Diastolic 70, LUE, Sitting   Height 5 ft 4 in   Weight 116 lb 5 oz   BMI Calculated 19 96   BSA Calculated 1 55     Physical Exam    Constitutional   General appearance: No acute distress, well appearing and well nourished  Eyes   Conjunctiva and Sclera examination: Conjunctiva pink, sclera anicteric      Ears, Nose, Mouth, and Throat - External inspection of ears and nose: Normal without deformities or discharge  Neck   Neck and thyroid: Normal, supple, trachea midline, no thyromegaly  Pulmonary   Respiratory effort: No increased work of breathing or signs of respiratory distress  Cardiovascular   Auscultation of heart: Abnormal   A grade 2 systolic murmur was heard at the RUSB  Carotid pulses: Normal, 2+ bilaterally  Examination of extremities for edema and/or varicosities: Normal     Chest - Chest: Normal    Abdomen   Abdomen: Non-tender and no distention  Musculoskeletal Gait and station: Normal gait  Skin - Skin and subcutaneous tissue: Normal without rashes or lesions  Skin is warm and well perfused, normal turgor  Neurologic - Cranial nerves: II - XII intact  Psychiatric - Orientation to person, place, and time: Normal  Mood and affect: Normal       Assessment    1  3-vessel coronary artery disease (414 00) (I25 10)   2  CABG   · CABG x 3   3  Mitral valve insufficiency (424 0) (I34 0)   4  Hyperlipidemia (272 4) (E78 5)   5  Hypertension (401 9) (I10)    Plan  Anxiety    · DiazePAM 5 MG Oral Tablet; TAKE 1 TABLET 3 TIMES DAILY AS NEEDED   Rx By: Juana Dominguez; Dispense: 30 Days ; #:90 Tablet; Refill: 0; For: Anxiety; MARIBELL = N; Print Rx  Hypertension    · Follow-up visit in 6 months Evaluation and Treatment  Follow-up  Status: Hold For -  Scheduling  Requested for: 04CQK8718   Ordered; For: Hypertension; Ordered By: Juana Dominguez Performed:  Due: 04DIW4281    Discussion/Summary    74year old man with coronary artery disease, hypertension, and dyslipidemia presents for follow up  Asymptomatic from a cardiac standpoint  No chest pain, shortness of breath, or palpitations  No chest pain, shortness of breath, or palpitations  Echocardiogram 9/28/17 demonstrated normal left ventricular systolic function and mild mitral regurgitation - no change  Impression:  1  Coronary artery disease s/p CABG - stable    2  Hypertension - good control  3  Mild to moderate mitral regurgitation - stable  Actually mitral regurgitation is improved from last time  4  Dyslipidemia - on statin  Recommendations:  1  Continue current medications  2  Follow up in 6 months  Thank you very much for allowing me to participate in the care of this patient  If you have any questions, please do not hesitate to contact me        Future Appointments    Signatures   Electronically signed by : ASAEL Lainez ; Sep 29 2017 11:29AM EST                       (Author)

## 2018-01-12 NOTE — RESULT NOTES
Message   Notify the patient echo was fairly normal it did show a mildly reduced right ventricle function and also a very mild Mitral valve regurgitation I would like the patient to see cardiologist Dr Behzad Santos and follow-up with me as scheduled        Verified Results  ECHO COMPLETE WITH CONTRAST IF INDICATED 2017 01:16PM Jacklyn Vidal     Test Name Result Flag Reference   ECHO COMPLETE WITH CONTRAST IF INDICATED (Report)     [facility], [address] [facility]   [phone]     Transthoracic Echocardiogram   2D, M-mode, Doppler, and Color Doppler     Study date: 28-Sep-2017     Patient: Skip Grimaldo   MR number: [id number]   Account number: [de-identified]   : 1950   Age: 74 years   Gender: Male   Status: Outpatient   Location: [address] [place] [address]   Height: 64 in   Weight: 112 9 lb   BP: 120/ 70 mmHg     Indications: Cardiac murmur     Diagnoses: R01 1 - Cardiac murmur, unspecified     Sonographer: ADELAIDA House, RDCS   Referring Physician: Landen Ordoñez DO   Group: [facility]   Interpreting Physician: Castillo Oreilly MD     SUMMARY     LEFT VENTRICLE:   Systolic function was normal  Ejection fraction was estimated to be 60 %  There were no regional wall motion abnormalities  RIGHT VENTRICLE:   The size was normal    Systolic function was mildly reduced  MITRAL VALVE:   There was mild regurgitation  TRICUSPID VALVE:   There was mild regurgitation  Pulmonary artery systolic pressure was within the normal range  PULMONIC VALVE:   There was trace regurgitation  HISTORY: PRIOR HISTORY: CAD; CABG; Hyperlipidemia; Hypertension; Hypothyroid; Diabetes Mellitus; Post polio syndrome     PROCEDURE: The study was performed in the [address] [place] [address]  This was a routine study  The transthoracic approach was used   The study included complete 2D imaging, M-mode, complete spectral Doppler, and color Doppler  The   heart rate was 65 bpm, at the start of the study  Images were obtained from the parasternal, apical, subcostal, and suprasternal notch acoustic windows  Image quality was adequate  LEFT VENTRICLE: Size was normal  Systolic function was normal  Ejection fraction was estimated to be 60 %  There were no regional wall motion abnormalities  Wall thickness was normal  DOPPLER: Left ventricular diastolic function parameters   were normal      VENTRICULAR SEPTUM: There was normal motion  There was normal contour  RIGHT VENTRICLE: The size was normal  Systolic function was mildly reduced  Wall thickness was normal      LEFT ATRIUM: Size was normal      RIGHT ATRIUM: Size was normal      MITRAL VALVE: There was mild thickening of the valve  There was normal leaflet separation  DOPPLER: The transmitral velocity was within the normal range  There was no evidence for stenosis  There was mild regurgitation  AORTIC VALVE: The valve was trileaflet  Leaflets exhibited mildly increased thickness and lower normal cuspal separation  DOPPLER: Transaortic velocity was within the normal range  There was no evidence for stenosis  There was no   regurgitation  TRICUSPID VALVE: The valve structure was normal  There was normal leaflet separation  DOPPLER: The transtricuspid velocity was within the normal range  There was no evidence for stenosis  There was mild regurgitation  Pulmonary artery   systolic pressure was within the normal range  PULMONIC VALVE: Leaflets exhibited normal thickness, no calcification, and normal cuspal separation  DOPPLER: The transpulmonic velocity was within the normal range  There was trace regurgitation  PERICARDIUM: There was no pericardial effusion  AORTA: The root exhibited normal size       SYSTEM MEASUREMENT TABLES     2D   %FS: 33 82 %   AV Diam: 3 01 cm   EDV(Teich): 24 47 ml   EF(Cube): 71 01 %   EF(Teich): 64 83 %   ESV(Cube): 5 06 ml   ESV(Teich): 8 61 ml   IVSd: 1 38 cm   LA Area: 16 89 cm2   LA Diam: 4 28 cm   LVEDV MOD A4C: 62 16 ml   LVEF MOD A4C: 61 5 %   LVESV MOD A4C: 23 93 ml   LVIDd: 2 59 cm   LVIDs: 1 72 cm   LVLd A4C: 7 32 cm   LVLs A4C: 5 72 cm   LVPWd: 1 08 cm   RA Area: 9 22 cm2   RV Diam: 3 26 cm   SI(Cube): 8 05 ml/m2   SI(Teich): 10 3 ml/m2   SV MOD A4C: 38 23 ml   SV(Cube): 12 4 ml   SV(Teich): 15 87 ml     CW   TR MaxP 57 mmHg   TR Vmax: 2 14 m/s     MM   TAPSE: 1 4 cm     PW   E': 0 05 m/s   E/E': 25 04   MV A Camilo: 1 34 m/s   MV Dec Redwood: 5 38 m/s2   MV DecT: 251 7 ms   MV E Camilo: 1 35 m/s   MV E/A Ratio: 1 01     [facility] Accredited [facility]     Prepared and electronically signed by     Gokul Hernández MD   Signed 28-Sep-2017 17:41:27       Signatures   Electronically signed by : Anthony Hidalgo DO; Sep 28 2017  8:03PM EST                       (Author)

## 2018-01-13 VITALS
HEIGHT: 64 IN | SYSTOLIC BLOOD PRESSURE: 120 MMHG | HEART RATE: 62 BPM | TEMPERATURE: 97 F | RESPIRATION RATE: 14 BRPM | BODY MASS INDEX: 19.29 KG/M2 | WEIGHT: 113 LBS | DIASTOLIC BLOOD PRESSURE: 70 MMHG | OXYGEN SATURATION: 94 %

## 2018-01-13 VITALS
HEIGHT: 64 IN | WEIGHT: 110.44 LBS | HEART RATE: 59 BPM | DIASTOLIC BLOOD PRESSURE: 52 MMHG | BODY MASS INDEX: 18.85 KG/M2 | SYSTOLIC BLOOD PRESSURE: 108 MMHG

## 2018-01-13 NOTE — RESULT NOTES
Verified Results  (1) CBC/PLT/DIFF [id number] 11:13AM Humaira Gonzalez     Test Name Result Flag Reference   WBC COUNT 7 68 Thousand/uL  4 31-10 16   RBC COUNT 4 72 Million/uL  3 88-5 62   HEMOGLOBIN 14 9 g/dL  12 0-17 0   HEMATOCRIT 42 2 %  36 5-49 3   MCV 89 fL  82-98   MCH 31 6 pg  26 8-34 3   MCHC 35 3 g/dL  31 4-37 4   RDW 13 4 %  11 6-15 1   MPV 10 1 fL  8 9-12 7   PLATELET COUNT 905 Thousands/uL  149-390   nRBC AUTOMATED 0 /100 WBCs     NEUTROPHILS RELATIVE PERCENT 72 %  43-75   LYMPHOCYTES RELATIVE PERCENT 13 % L 14-44   MONOCYTES RELATIVE PERCENT 7 %  4-12   EOSINOPHILS RELATIVE PERCENT 8 % H 0-6   BASOPHILS RELATIVE PERCENT 0 %  0-1   NEUTROPHILS ABSOLUTE COUNT 5 52 Thousands/µL  1 85-7 62   LYMPHOCYTES ABSOLUTE COUNT 0 99 Thousands/µL  0 60-4 47   MONOCYTES ABSOLUTE COUNT 0 51 Thousand/µL  0 17-1 22   EOSINOPHILS ABSOLUTE COUNT 0 60 Thousand/µL  0 00-0 61   BASOPHILS ABSOLUTE COUNT 0 03 Thousands/µL  0 00-0 10     (1) COMPREHENSIVE METABOLIC PANEL [id number] 12:25ZY Blasi, [address] [facility] Kidney Disease Education Program recommendations are as follows:  GFR calculation is accurate only with a steady state creatinine  Chronic Kidney disease less than 60 ml/min/1 73 sq  meters  Kidney failure less than 15 ml/min/1 73 sq  meters  Test Name Result Flag Reference   GLUCOSE,RANDM 95 mg/dL     If the patient is fasting, the ADA then defines impaired fasting glucose as > 100 mg/dL and diabetes as > or equal to 123 mg/dL     SODIUM 136 mmol/L  136-145   POTASSIUM 4 5 mmol/L  3 5-5 3   CHLORIDE 104 mmol/L  100-108   CARBON DIOXIDE 28 mmol/L  21-32   ANION GAP (CALC) 4 mmol/L  4-13   BLOOD UREA NITROGEN 14 mg/dL  5-25   CREATININE 0 76 mg/dL  0 60-1 30   Standardized to IDMS reference method   CALCIUM 9 0 mg/dL  8 3-10 1   BILI, TOTAL 0 43 mg/dL  0 20-1 00   ALK PHOSPHATAS 67 U/L     ALT (SGPT) 50 U/L  12-78   AST(SGOT) 31 U/L  5-45   ALBUMIN 3 9 g/dL  3 5-5 0   TOTAL PROTEIN 7 4 g/dL  6 4-8 2   eGFR Non-African American      >60 0 ml/min/1 73sq m     (1) HEMOGLOBIN A1C [id number] 11:13AM David Funez   5 7-6 4% impaired fasting glucose  >=6 5% diagnosis of diabetes    Falsely low levels are seen in conditions linked to short RBC life span-  hemolytic anemia, and splenomegaly  Falsely elevated levels are seen in situations where there is an increased production of RBC- receipt of erythropoietin or blood transfusions  Adopted from ADA-Clinical Practice Recommendations     Test Name Result Flag Reference   HEMOGLOBIN A1C 6 9 % H 4 0-5 6   EST  AVG  GLUCOSE 151 mg/dl       (1) LIPID PANEL, FASTING [id number] 11:13AM David Funez   Triglyceride:         Normal              <150 mg/dl       Borderline High    150-199 mg/dl       High               200-499 mg/dl       Very High          >499 mg/dl  Cholesterol:         Desirable        <200 mg/dl      Borderline High  200-239 mg/dl      High             >239 mg/dl  HDL Cholesterol:        High    >59 mg/dL      Low     <41 mg/dL  LDL CALCULATED:    This screening LDL is a calculated result  It does not have the accuracy of the Direct Measured LDL in the monitoring of patients with hyperlipidemia and/or statin therapy  Direct Measure LDL (GDG788) must be ordered separately in these patients       Test Name Result Flag Reference   CHOLESTEROL 159 mg/dL     HDL,DIRECT 61 mg/dL H 40-60   LDL CHOLESTEROL CALCULATED 80 mg/dL  0-100   TRIGLYCERIDES 90 mg/dL  <=150     (1) MICROALBUMIN CREATININE RATIO, RANDOM URINE [id number] 11:13AM David Funez     Test Name Result Flag Reference   MICROALBUMIN/ CREAT R 196 mg/g creatinine H 0-30   MICROALBUMIN,URINE 114 0 mg/L H 0 0-20 0   CREATININE URINE 58 3 mg/dL

## 2018-01-13 NOTE — RESULT NOTES
Verified Results  * XR CHEST PA & LATERAL [id number] 11:40AM Jyl Case   TW Order Number: [id number]     Test Name Result Flag Reference   XR CHEST PA & LATERAL (Report)     CHEST - DUAL ENERGY     INDICATION: Productive cough for 3 days  Suspected pneumonia  History of open heart surgery 16 years ago  COMPARISON: None     VIEWS: PA (including soft tissue/bone algorithms) and lateral projections; 4 images     FINDINGS: There are median sternotomy wires indicating prior cardiac surgery  Cardiomediastinal silhouette appears unremarkable  There is airspace consolidation in the posterior basilar left lower lobe consistent with pneumonia  No pleural effusion  No pneumothorax  Visualized osseous structures appear within normal limits for the patient's age  IMPRESSION:     There is airspace consolidation in the posterior basilar left lower lobe consistent with pneumonia  Follow-up chest x-ray to resolution is recommended  ##imslh##imslh       Workstation performed: [id number]     Signed by:   Zita May MD   3/10/16       Plan  Community acquired pneumonia    · Levofloxacin 500 MG Oral Tablet (Levaquin); TAKE 1 TABLET DAILY X 7 DAYS    Discussion/Summary   Patient notified of test results consistent with LLL pneumonia  Abx sent to pharmacy which he was advised to start today  Potential AE's discussed  Advise f/u appointment in office on Monday (3 days) to see how he is doing  He will call office to schedule  Can give slip for f/u CXR at that time  Urgent care/ER for any worsening symptoms in the interim

## 2018-01-14 VITALS
WEIGHT: 117.01 LBS | RESPIRATION RATE: 16 BRPM | HEIGHT: 64 IN | BODY MASS INDEX: 19.97 KG/M2 | DIASTOLIC BLOOD PRESSURE: 68 MMHG | OXYGEN SATURATION: 92 % | SYSTOLIC BLOOD PRESSURE: 140 MMHG | HEART RATE: 70 BPM

## 2018-01-14 VITALS
SYSTOLIC BLOOD PRESSURE: 120 MMHG | WEIGHT: 116 LBS | HEART RATE: 97 BPM | HEIGHT: 64 IN | OXYGEN SATURATION: 97 % | BODY MASS INDEX: 19.81 KG/M2 | DIASTOLIC BLOOD PRESSURE: 60 MMHG

## 2018-01-14 NOTE — RESULT NOTES
Message   Please notify the patient there is elevation of the TSH and also elevation of the urine microalbumin/creatinine ratio please have the patient set up office visit with me or Mahogany regarding these abnormal labs        Verified Results  (1) COMPREHENSIVE METABOLIC PANEL [id number] 99:40PA [employer]     Test Name Result Flag Reference   GLUCOSE,RANDM 79 mg/dL     If the patient is fasting, the ADA then defines impaired fasting glucose as > 100 mg/dL and diabetes as > or equal to 123 mg/dL  SODIUM 140 mmol/L  136-145   POTASSIUM 4 2 mmol/L  3 5-5 3   CHLORIDE 102 mmol/L  100-108   CARBON DIOXIDE 27 mmol/L  21-32   ANION GAP (CALC) 11 mmol/L  4-13   BLOOD UREA NITROGEN 15 mg/dL  5-25   CREATININE 0 88 mg/dL  0 60-1 30   Standardized to IDMS reference method   CALCIUM 9 8 mg/dL  8 3-10 1   BILI, TOTAL 0 50 mg/dL  0 20-1 00   ALK PHOSPHATAS 64 U/L     ALT (SGPT) 38 U/L  12-78   AST(SGOT) 31 U/L  5-45   ALBUMIN 4 1 g/dL  3 5-5 0   TOTAL PROTEIN 7 7 g/dL  6 4-8 2   eGFR Non-African American      >60 0 ml/min/1 73sq [facility] [employer] Disease Education Program recommendations are as follows:  GFR calculation is accurate only with a steady state creatinine  Chronic Kidney disease less than 60 ml/min/1 73 sq  meters  Kidney failure less than 15 ml/min/1 73 sq  meters  (1) LIPID PANEL, FASTING [id number] 11:19AM [employer]     Test Name Result Flag Reference   CHOLESTEROL 192 mg/dL     HDL,DIRECT 72 mg/dL H 40-60   Specimen collection should occur prior to Metamizole administration due to the potential for falsely depressed results     LDL CHOLESTEROL CALCULATED 102 mg/dL H 0-100   Triglyceride:         Normal              <150 mg/dl       Borderline High    150-199 mg/dl       High               200-499 mg/dl       Very High          >499 mg/dl  Cholesterol:         Desirable        <200 mg/dl      Borderline High  200-239 mg/dl      High             >239 mg/dl  HDL Cholesterol:        High    >59 mg/dL      Low     <41 mg/dL  LDL CALCULATED:    This screening LDL is a calculated result  It does not have the accuracy of the Direct Measured LDL in the monitoring of patients with hyperlipidemia and/or statin therapy  Direct Measure LDL (FLF483) must be ordered separately in these patients  TRIGLYCERIDES 89 mg/dL  <=150   Specimen collection should occur prior to N-Acetylcysteine or Metamizole administration due to the potential for falsely depressed results  (1) TSH [id number] 11:19AM [facility]     Test Name Result Flag Reference   TSH 6 305 uIU/mL H 0 358-3 740   Patients undergoing fluorescein dye angiography may retain small amounts of fluorescein in the body for 48-72 hours post procedure  Samples containing fluorescein can produce falsely depressed TSH values  If the patient had this procedure,a specimen should be resubmitted post fluorescein clearance  (1) HEMOGLOBIN A1C [id number] 11:19AM Delma Ba     Test Name Result Flag Reference   HEMOGLOBIN A1C 6 3 %  4 2-6 3   EST  AVG   GLUCOSE 134 mg/dl       (1) T4, FREE [id number] 11:19AM Delma Ba     Test Name Result Flag Reference   T4,FREE 0 92 ng/dL  0 76-1 46     (1) FREE T3 [id number] 11:19AM Charlies Evaro     Test Name Result Flag Reference   FREE T3 2 69 pg/mL  2 30-4 20     (1) MICROALBUMIN CREATININE RATIO, RANDOM URINE [id number] 11:19AM Delma Ba     Test Name Result Flag Reference   MICROALBUMIN/ CREAT R 118 mg/g creatinine H 0-30   MICROALBUMIN,URINE 59 6 mg/L H 0 0-20 0   CREATININE URINE 50 6 mg/dL         Signatures   Electronically signed by : Izzy Keller DO; Mar  4 2017  8:46AM EST                       (Author)

## 2018-01-15 VITALS
DIASTOLIC BLOOD PRESSURE: 60 MMHG | SYSTOLIC BLOOD PRESSURE: 158 MMHG | BODY MASS INDEX: 19.01 KG/M2 | WEIGHT: 111.38 LBS | OXYGEN SATURATION: 95 % | HEIGHT: 64 IN | HEART RATE: 65 BPM | TEMPERATURE: 98.2 F

## 2018-01-15 NOTE — RESULT NOTES
Verified Results  (1) CBC/PLT/DIFF [id number] 10:23AM Darron Muse     Test Name Result Flag Reference   WBC COUNT 7 13 Thousand/uL  4 31-10 16   RBC COUNT 4 84 Million/uL  3 88-5 62   HEMOGLOBIN 14 6 g/dL  12 0-17 0   HEMATOCRIT 42 6 %  36 5-49 3   MCV 88 fL  82-98   MCH 30 2 pg  26 8-34 3   MCHC 34 3 g/dL  31 4-37 4   RDW 13 5 %  11 6-15 1   MPV 9 5 fL  8 9-12 7   PLATELET COUNT 743 Thousands/uL  149-390   NEUTROPHILS RELATIVE PERCENT 73 %  43-75   LYMPHOCYTES RELATIVE PERCENT 14 %  14-44   MONOCYTES RELATIVE PERCENT 7 %  4-12   EOSINOPHILS RELATIVE PERCENT 6 %  0-6   BASOPHILS RELATIVE PERCENT 0 %  0-1   NEUTROPHILS ABSOLUTE COUNT 5 21 Thousands/?L  1 85-7 62   LYMPHOCYTES ABSOLUTE COUNT 0 98 Thousands/?L  0 60-4 47   MONOCYTES ABSOLUTE COUNT 0 50 Thousand/?L  0 17-1 22   EOSINOPHILS ABSOLUTE COUNT 0 42 Thousand/?L  0 00-0 61   BASOPHILS ABSOLUTE COUNT 0 02 Thousands/?L  0 00-0 10     (1) COMPREHENSIVE METABOLIC PANEL [id number] 35:65BC DarronSermo     Test Name Result Flag Reference   GLUCOSE,RANDM 80 mg/dL     If the patient is fasting, the ADA then defines impaired fasting glucose as > 100 mg/dL and diabetes as > or equal to 123 mg/dL     SODIUM 140 mmol/L  136-145   POTASSIUM 4 3 mmol/L  3 5-5 3   CHLORIDE 105 mmol/L  100-108   CARBON DIOXIDE 27 mmol/L  21-32   ANION GAP (CALC) 8 mmol/L  4-13   BLOOD UREA NITROGEN 16 mg/dL  5-25   CREATININE 0 72 mg/dL  0 60-1 30   Standardized to IDMS reference method   CALCIUM 9 3 mg/dL  8 3-10 1   BILI, TOTAL 0 50 mg/dL  0 20-1 00   ALK PHOSPHATAS 62 U/L     ALT (SGPT) 40 U/L  12-78   AST(SGOT) 35 U/L  5-45   ALBUMIN 4 1 g/dL  3 5-5 0   TOTAL PROTEIN 7 3 g/dL  6 4-8 2   eGFR Non-African American      >60 0 ml/min/1 73sq [facility] Disease Education Program recommendations are as follows:  GFR calculation is accurate only with a steady state creatinine  Chronic Kidney disease less than 60 ml/min/1 73 sq  meters  Kidney failure less than 15 ml/min/1 73 sq  meters  (1) LIPID PANEL, FASTING [id number] 10:23AM [facility]     Test Name Result Flag Reference   CHOLESTEROL 170 mg/dL     HDL,DIRECT 68 mg/dL H 40-60   Specimen collection should occur prior to Metamizole administration due to the potential for falsely depressed results  LDL CHOLESTEROL CALCULATED 84 mg/dL  0-100   Triglyceride:         Normal              <150 mg/dl       Borderline High    150-199 mg/dl       High               200-499 mg/dl       Very High          >499 mg/dl  Cholesterol:         Desirable        <200 mg/dl      Borderline High  200-239 mg/dl      High             >239 mg/dl  HDL Cholesterol:        High    >59 mg/dL      Low     <41 mg/dL  LDL CALCULATED:    This screening LDL is a calculated result  It does not have the accuracy of the Direct Measured LDL in the monitoring of patients with hyperlipidemia and/or statin therapy  Direct Measure LDL (ICR903) must be ordered separately in these patients  TRIGLYCERIDES 88 mg/dL  <=150   Specimen collection should occur prior to N-Acetylcysteine or Metamizole administration due to the potential for falsely depressed results  (1) TSH WITH FT4 REFLEX [id number] 10:23AM [facility]     Test Name Result Flag Reference   TSH 5 663 uIU/mL H 0 358-3 740   A FT4 has been ordered    Patients undergoing fluorescein dye angiography may retain small amounts of fluorescein in the body for 48-72 hours post procedure  Samples containing fluorescein can produce falsely depressed TSH values  If the patient had this procedure,a specimen should be resubmitted post fluorescein clearance  (1) HEMOGLOBIN A1C [id number] 10:23AM [facility]     Test Name Result Flag Reference   HEMOGLOBIN A1C 6 2 %  4 2-6 3   EST  AVG   GLUCOSE 131 mg/dl       (1) MICROALBUMIN CREATININE RATIO, RANDOM URINE [id number] 10:23AM [facility]     Test Name Result Flag Reference   MICROALBUMIN/ CREAT R 102 mg/g creatinine H 0-30   MICROALBUMIN,URINE 45 2 mg/L H 0 0-20 0   CREATININE URINE 44 2 mg/dL       (1) TSH WITH FT4 REFLEX [id number] 10:23AM Alannah Constable     Test Name Result Flag Reference   T4,FREE 0 83 ng/dL  0 76-1 46       Discussion/Summary   Please bring a copy of your lab results to your new PCP to address any abnormalities

## 2018-01-15 NOTE — RESULT NOTES
Message   I left message summarizing results, but can we call him again just to make sure he knows he is supposed to get a chest CT done  Thanks  Verified Results  * XR CHEST PA & LATERAL [id number] 03:05PM Matt Diaz Order Number: [id number]     Test Name Result Flag Reference   XR CHEST PA & LATERAL (Report)     CHEST - DUAL ENERGY     INDICATION: Pneumonia  COMPARISON: March 10, 2016  VIEWS: PA (including soft tissue/bone algorithms) and lateral projections; 4 images     FINDINGS:        Cardiomediastinal silhouette appears unremarkable  There is elevation of the anterior right hemidiaphragm prior examination  Airspace opacity in the medial left lung base seen only on the frontal view is stable from March 10, 2016  Lungs are otherwise clear  No pneumothorax or pleural effusion  Visualized osseous structures appear within normal limits for the patient's age  IMPRESSION:     Stable airspace opacity in the medial left lung base seen only on the frontal view  Is unclear whether this represents persistent pneumonia or perhaps lingular scarring  Nonemergent follow-up chest CT is recommended for more accurate characterization  ##sigslh##sigslh       Workstation performed: [id number]     Signed by:   Harini Johnson MD   4/11/16       Plan  Chest x-ray abnormality, Community acquired pneumonia    · * CT CHEST HIGH RESOLUTION; Status:Need Information - Financial Authorization; Requested for:12Apr2016;     Discussion/Summary   Patient notified of CXR results showing left mid lobe abnormality suggesting either scarring or persistent pneumonia  Left message  Chest CT recommended (ordered)  Will mail lab slip to patient

## 2018-01-17 NOTE — RESULT NOTES
Verified Results  * CT CHEST HIGH RESOLUTION 24Apr2016 02:31PM Gabbie Mark    Order Number: [id number]     Test Name Result Flag Reference   CT CHEST HIGH RESOLUTION (Report)     CT CHEST INCLUDING HIGH RESOLUTION SLICES - WITHOUT CONTRAST     INDICATION: Abnormal chest x-ray with pneumonia versus scarring     COMPARISON: Chest x-ray from 4/11/2016     TECHNIQUE: CT examination of the chest was performed without intravenous contrast  Additional thin section images were performed at 10 mm intervals in supine and prone positioning in inspiration as well as supine in expiration  This examination, like    all CT scans performed in the [facility], was performed utilizing techniques to minimize radiation dose exposure, including the use of iterative reconstruction and automated exposure control  FINDINGS:     LUNGS: There is left lower lobe bronchiectasis and groundglass opacity likely related to chronic/recurrent infection  No septal lobular thickening, bronchial wall thickening, diffuse nodularity, or honeycombing to suggest interstitial lung disease  No dominant pulmonary nodule or mass  PLEURA: Unremarkable  VESSELS: Unremarkable for patient's age  HEART: Unremarkable  MEDIASTINUM AND CHLOÉ: Unremarkable  CHEST WALL AND LOWER NECK: Unremarkable  VISUALIZED STRUCTURES IN THE UPPER ABDOMEN: Unremarkable  OSSEOUS STRUCTURES: No acute fracture or destructive osseous lesion  IMPRESSION:     Left lower lobe bronchiectasis and groundglass opacity likely secondary to chronic/recurrent infection         Workstation performed: [id number]     Signed by:   Nichole Yeager MD   4/26/16       Plan  Opacity of lung on imaging study    · *1 -  PULMONARY MEDICINE Physician Referral  Consult  Status: Active  Requested  for: 26Apr2016  Care Summary provided  : Yes    Discussion/Summary   Patient notified of CT results showing continued LLL opacity suggestive of chronic/recurrent infection  States he had previous PNA 2008  Denies weight loss, night sweats, chronic cough, known hx of TB  Will refer to [facility] as precaution  May warrant additional or f/u testing

## 2018-01-18 NOTE — RESULT NOTES
Message   Notify the patient normal cholesterol panel follow up as scheduled        Verified Results  (1) COMPREHENSIVE METABOLIC PANEL [id number] 82:61DP Skip Sang     Test Name Result Flag Reference   GLUCOSE,RANDM 79 mg/dL     If the patient is fasting, the ADA then defines impaired fasting glucose as > 100 mg/dL and diabetes as > or equal to 123 mg/dL  SODIUM 140 mmol/L  136-145   POTASSIUM 4 2 mmol/L  3 5-5 3   CHLORIDE 102 mmol/L  100-108   CARBON DIOXIDE 27 mmol/L  21-32   ANION GAP (CALC) 11 mmol/L  4-13   BLOOD UREA NITROGEN 15 mg/dL  5-25   CREATININE 0 88 mg/dL  0 60-1 30   Standardized to IDMS reference method   CALCIUM 9 8 mg/dL  8 3-10 1   BILI, TOTAL 0 50 mg/dL  0 20-1 00   ALK PHOSPHATAS 64 U/L     ALT (SGPT) 38 U/L  12-78   AST(SGOT) 31 U/L  5-45   ALBUMIN 4 1 g/dL  3 5-5 0   TOTAL PROTEIN 7 7 g/dL  6 4-8 2   eGFR Non-African American      >60 0 ml/min/1 73sq [facility] [employer] Disease Education Program recommendations are as follows:  GFR calculation is accurate only with a steady state creatinine  Chronic Kidney disease less than 60 ml/min/1 73 sq  meters  Kidney failure less than 15 ml/min/1 73 sq  meters  (1) LIPID PANEL, FASTING [id number] 11:19AM Skip Sang     Test Name Result Flag Reference   CHOLESTEROL 192 mg/dL     HDL,DIRECT 72 mg/dL H 40-60   Specimen collection should occur prior to Metamizole administration due to the potential for falsely depressed results  LDL CHOLESTEROL CALCULATED 102 mg/dL H 0-100   Triglyceride:         Normal              <150 mg/dl       Borderline High    150-199 mg/dl       High               200-499 mg/dl       Very High          >499 mg/dl  Cholesterol:         Desirable        <200 mg/dl      Borderline High  200-239 mg/dl      High             >239 mg/dl  HDL Cholesterol:        High    >59 mg/dL      Low     <41 mg/dL  LDL CALCULATED:    This screening LDL is a calculated result    It does not have the accuracy of the Direct Measured LDL in the monitoring of patients with hyperlipidemia and/or statin therapy  Direct Measure LDL (PLY721) must be ordered separately in these patients  TRIGLYCERIDES 89 mg/dL  <=150   Specimen collection should occur prior to N-Acetylcysteine or Metamizole administration due to the potential for falsely depressed results         Signatures   Electronically signed by : Landen Ordoñez DO; Mar  4 2017  8:44AM EST                       (Author)

## 2018-01-18 NOTE — RESULT NOTES
Message   Notify the patient normal comprehensive metabolic panel follow up as scheduled        Verified Results  (1) COMPREHENSIVE METABOLIC PANEL [id number] 54:25AF Clementine Sawyer     Test Name Result Flag Reference   GLUCOSE,RANDM 79 mg/dL     If the patient is fasting, the ADA then defines impaired fasting glucose as > 100 mg/dL and diabetes as > or equal to 123 mg/dL  SODIUM 140 mmol/L  136-145   POTASSIUM 4 2 mmol/L  3 5-5 3   CHLORIDE 102 mmol/L  100-108   CARBON DIOXIDE 27 mmol/L  21-32   ANION GAP (CALC) 11 mmol/L  4-13   BLOOD UREA NITROGEN 15 mg/dL  5-25   CREATININE 0 88 mg/dL  0 60-1 30   Standardized to IDMS reference method   CALCIUM 9 8 mg/dL  8 3-10 1   BILI, TOTAL 0 50 mg/dL  0 20-1 00   ALK PHOSPHATAS 64 U/L     ALT (SGPT) 38 U/L  12-78   AST(SGOT) 31 U/L  5-45   ALBUMIN 4 1 g/dL  3 5-5 0   TOTAL PROTEIN 7 7 g/dL  6 4-8 2   eGFR Non-African American      >60 0 ml/min/1 73sq morgan   [facility] Energy Disease Education Program recommendations are as follows:  GFR calculation is accurate only with a steady state creatinine  Chronic Kidney disease less than 60 ml/min/1 73 sq  meters  Kidney failure less than 15 ml/min/1 73 sq  meters         Signatures   Electronically signed by : Garry Ruth DO; Mar  4 2017  8:43AM EST                       (Author)

## 2018-01-23 NOTE — RESULT NOTES
Discussion/Summary   Reviewed CT results - stable findings, called patient and left message regarding stability  Will discuss further at next visit  Verified Results  * CT CHEST HIGH RESOLUTION [id number] 03:40PM Geovanna Umanzor Order Number: [id number]   Performing Comments: scheduled for december 29th at 1:00p at [facility]   - Patient Instructions: To schedule this appointment, please contact Central Scheduling at [phone]  Test Name Result Flag Reference   CT CHEST HIGH RESOLUTION (Report)     CT CHEST INCLUDING HIGH RESOLUTION SLICES - WITHOUT CONTRAST     INDICATION: J47 9: Bronchiectasis, uncomplicated   N05 9: Abnormal findings on diagnostic imaging of other specified body structures  History taken directly from the electronic ordering system  Follow-up  COMPARISON: 7/19/2017, 7/25/2016 and 4/24/2016  TECHNIQUE: CT examination of the chest was performed without intravenous contrast  Additional thin section images were performed at 10 mm intervals in supine and prone positioning in inspiration as well as supine in expiration  Reformatted images were    created in axial, sagittal, and coronal planes  Radiation dose length product (DLP) for this visit: 220 mGy-cm   This examination, like all CT scans performed in the [facility], was performed utilizing techniques to minimize radiation dose exposure, including the use of iterative    reconstruction and automated exposure control  FINDINGS:     LUNGS: There is no change in left lower lobe bronchiectasis, with surrounding diffuse groundglass density  Nonspecific scattered mild groundglass densities in the left upper lobe are also unchanged  There is right basilar subsegmental atelectasis or    scarring  No dominant pulmonary nodule or mass  PLEURA: Unremarkable  HEART/GREAT VESSELS: Normal heart size  Status post CABG  Normal caliber thoracic aorta with mild atherosclerotic calcifications  MEDIASTINUM AND CHLOÉ: Unremarkable  CHEST WALL AND LOWER NECK:  Status post median sternotomy  VISUALIZED STRUCTURES IN THE UPPER ABDOMEN: Unremarkable  OSSEOUS STRUCTURES: No acute fracture or destructive osseous lesion  IMPRESSION:     No significant change in bronchiectasis and groundglass density in the left lower lobe, presumably reflecting sequela of chronic infectious process  Stable mild groundglass densities in the left upper lobe               Workstation performed: [id number]     Signed by:   Dewey Spangler MD   12/22/17

## 2018-01-31 ENCOUNTER — DOCUMENTATION (OUTPATIENT)
Dept: CARDIOLOGY CLINIC | Facility: CLINIC | Age: 68
End: 2018-01-31

## 2018-01-31 ENCOUNTER — TELEPHONE (OUTPATIENT)
Dept: CARDIOLOGY CLINIC | Facility: CLINIC | Age: 68
End: 2018-01-31

## 2018-02-12 DIAGNOSIS — E03.8 OTHER SPECIFIED HYPOTHYROIDISM: Primary | ICD-10-CM

## 2018-02-12 RX ORDER — LEVOTHYROXINE SODIUM 0.03 MG/1
TABLET ORAL
Qty: 30 TABLET | Refills: 5 | Status: SHIPPED | OUTPATIENT
Start: 2018-02-12 | End: 2018-11-09 | Stop reason: SDUPTHER

## 2018-03-03 ENCOUNTER — APPOINTMENT (OUTPATIENT)
Dept: LAB | Facility: CLINIC | Age: 68
End: 2018-03-03
Payer: MEDICARE

## 2018-03-03 ENCOUNTER — TRANSCRIBE ORDERS (OUTPATIENT)
Dept: LAB | Facility: CLINIC | Age: 68
End: 2018-03-03

## 2018-03-03 DIAGNOSIS — E11.9 DIABETES MELLITUS WITHOUT COMPLICATION (HCC): ICD-10-CM

## 2018-03-03 DIAGNOSIS — E11.9 DIABETES MELLITUS WITHOUT COMPLICATION (HCC): Primary | ICD-10-CM

## 2018-03-03 DIAGNOSIS — I51.9 MYXEDEMA HEART DISEASE: Primary | ICD-10-CM

## 2018-03-03 DIAGNOSIS — E03.9 MYXEDEMA HEART DISEASE: ICD-10-CM

## 2018-03-03 DIAGNOSIS — I51.9 MYXEDEMA HEART DISEASE: ICD-10-CM

## 2018-03-03 DIAGNOSIS — E03.9 MYXEDEMA HEART DISEASE: Primary | ICD-10-CM

## 2018-03-03 DIAGNOSIS — R93.89 ABNORMAL FINDINGS ON DIAGNOSTIC IMAGING OF OTHER SPECIFIED BODY STRUCTURES: ICD-10-CM

## 2018-03-03 LAB
ALBUMIN SERPL BCP-MCNC: 3.8 G/DL (ref 3.5–5)
ALP SERPL-CCNC: 63 U/L (ref 46–116)
ALT SERPL W P-5'-P-CCNC: 30 U/L (ref 12–78)
ANION GAP SERPL CALCULATED.3IONS-SCNC: 9 MMOL/L (ref 4–13)
AST SERPL W P-5'-P-CCNC: 26 U/L (ref 5–45)
BILIRUB SERPL-MCNC: 0.5 MG/DL (ref 0.2–1)
BUN SERPL-MCNC: 14 MG/DL (ref 5–25)
CALCIUM SERPL-MCNC: 9.1 MG/DL (ref 8.3–10.1)
CHLORIDE SERPL-SCNC: 104 MMOL/L (ref 100–108)
CHOLEST SERPL-MCNC: 219 MG/DL (ref 50–200)
CO2 SERPL-SCNC: 26 MMOL/L (ref 21–32)
CREAT SERPL-MCNC: 0.89 MG/DL (ref 0.6–1.3)
CREAT UR-MCNC: 161 MG/DL
EST. AVERAGE GLUCOSE BLD GHB EST-MCNC: 131 MG/DL
GFR SERPL CREATININE-BSD FRML MDRD: 87 ML/MIN/1.73SQ M
GLUCOSE P FAST SERPL-MCNC: 88 MG/DL (ref 65–99)
HBA1C MFR BLD: 6.2 % (ref 4.2–6.3)
HDLC SERPL-MCNC: 52 MG/DL (ref 40–60)
LDLC SERPL CALC-MCNC: 138 MG/DL (ref 0–100)
MICROALBUMIN UR-MCNC: 114 MG/L (ref 0–20)
MICROALBUMIN/CREAT 24H UR: 71 MG/G CREATININE (ref 0–30)
POTASSIUM SERPL-SCNC: 4.2 MMOL/L (ref 3.5–5.3)
PROT SERPL-MCNC: 7.6 G/DL (ref 6.4–8.2)
SODIUM SERPL-SCNC: 139 MMOL/L (ref 136–145)
TRIGL SERPL-MCNC: 143 MG/DL
TSH SERPL DL<=0.05 MIU/L-ACNC: 5.8 UIU/ML (ref 0.36–3.74)

## 2018-03-03 PROCEDURE — 36415 COLL VENOUS BLD VENIPUNCTURE: CPT

## 2018-03-03 PROCEDURE — 84443 ASSAY THYROID STIM HORMONE: CPT

## 2018-03-03 PROCEDURE — 86200 CCP ANTIBODY: CPT

## 2018-03-03 PROCEDURE — 82570 ASSAY OF URINE CREATININE: CPT | Performed by: INTERNAL MEDICINE

## 2018-03-03 PROCEDURE — 82043 UR ALBUMIN QUANTITATIVE: CPT | Performed by: INTERNAL MEDICINE

## 2018-03-03 PROCEDURE — 86430 RHEUMATOID FACTOR TEST QUAL: CPT

## 2018-03-03 PROCEDURE — 83036 HEMOGLOBIN GLYCOSYLATED A1C: CPT

## 2018-03-03 PROCEDURE — 80053 COMPREHEN METABOLIC PANEL: CPT

## 2018-03-03 PROCEDURE — 80061 LIPID PANEL: CPT

## 2018-03-05 ENCOUNTER — OFFICE VISIT (OUTPATIENT)
Dept: PULMONOLOGY | Facility: CLINIC | Age: 68
End: 2018-03-05
Payer: MEDICARE

## 2018-03-05 VITALS
DIASTOLIC BLOOD PRESSURE: 68 MMHG | HEIGHT: 65 IN | WEIGHT: 115.2 LBS | BODY MASS INDEX: 19.19 KG/M2 | TEMPERATURE: 97.6 F | RESPIRATION RATE: 18 BRPM | SYSTOLIC BLOOD PRESSURE: 118 MMHG | OXYGEN SATURATION: 93 % | HEART RATE: 68 BPM

## 2018-03-05 DIAGNOSIS — R05.9 COUGH: ICD-10-CM

## 2018-03-05 DIAGNOSIS — J47.9 BRONCHIECTASIS WITHOUT COMPLICATION (HCC): Primary | ICD-10-CM

## 2018-03-05 DIAGNOSIS — R93.89 ABNORMAL CHEST CT: ICD-10-CM

## 2018-03-05 LAB — RHEUMATOID FACT SER QL LA: NEGATIVE

## 2018-03-05 PROCEDURE — 99214 OFFICE O/P EST MOD 30 MIN: CPT | Performed by: INTERNAL MEDICINE

## 2018-03-05 RX ORDER — CHLORAL HYDRATE 500 MG
1 CAPSULE ORAL DAILY
COMMUNITY

## 2018-03-05 RX ORDER — BENZONATATE 100 MG/1
1 CAPSULE ORAL 3 TIMES DAILY PRN
COMMUNITY
Start: 2017-03-07 | End: 2018-03-08 | Stop reason: ALTCHOICE

## 2018-03-05 RX ORDER — DILTIAZEM HYDROCHLORIDE 240 MG/1
1 CAPSULE, EXTENDED RELEASE ORAL DAILY
COMMUNITY
Start: 2016-12-29 | End: 2018-04-13 | Stop reason: SDUPTHER

## 2018-03-05 RX ORDER — DIAZEPAM 5 MG/1
1 TABLET ORAL 3 TIMES DAILY PRN
COMMUNITY
End: 2018-11-29 | Stop reason: SDUPTHER

## 2018-03-05 RX ORDER — LEVOTHYROXINE SODIUM 0.03 MG/1
1 TABLET ORAL DAILY
COMMUNITY
Start: 2017-03-07 | End: 2018-11-09 | Stop reason: SDUPTHER

## 2018-03-05 RX ORDER — ALBUTEROL SULFATE 90 UG/1
2 AEROSOL, METERED RESPIRATORY (INHALATION) EVERY 6 HOURS PRN
Qty: 1 INHALER | Refills: 0 | Status: SHIPPED | COMMUNITY
Start: 2018-03-05 | End: 2018-04-04

## 2018-03-05 RX ORDER — TADALAFIL 20 MG/1
TABLET ORAL
COMMUNITY
Start: 2016-09-21 | End: 2018-10-18 | Stop reason: ALTCHOICE

## 2018-03-05 RX ORDER — MULTIVIT-MIN/IRON/FOLIC ACID/K 18-600-40
1 CAPSULE ORAL DAILY
Status: ON HOLD | COMMUNITY
End: 2019-03-27 | Stop reason: SDDI

## 2018-03-05 RX ORDER — SIMVASTATIN 40 MG
1 TABLET ORAL
COMMUNITY
Start: 2016-03-02 | End: 2020-06-05

## 2018-03-05 RX ORDER — LOSARTAN POTASSIUM 50 MG/1
1 TABLET ORAL DAILY
COMMUNITY
Start: 2016-12-29 | End: 2018-12-07 | Stop reason: SDUPTHER

## 2018-03-05 RX ORDER — FEXOFENADINE HCL 180 MG/1
1 TABLET ORAL DAILY PRN
COMMUNITY
Start: 2017-06-07 | End: 2019-06-24 | Stop reason: SDUPTHER

## 2018-03-05 RX ORDER — METFORMIN HYDROCHLORIDE 500 MG/1
1 TABLET, EXTENDED RELEASE ORAL 2 TIMES DAILY
COMMUNITY
Start: 2016-03-01 | End: 2018-12-07 | Stop reason: SDUPTHER

## 2018-03-05 NOTE — ASSESSMENT & PLAN NOTE
· Denied reflux symptoms  · Declined trial at nasal saline irrigation  · Will give trial of YOSEF  · Monitor for cough progression as sign of progressive ILD  · Spirometry at next visit

## 2018-03-05 NOTE — ASSESSMENT & PLAN NOTE
· Reported cough with scant nonpurulent sputum production  · Will give trial of Ventolin HFA on as needed basis

## 2018-03-05 NOTE — ASSESSMENT & PLAN NOTE
· HRCT chest in 12/2017 was stable  · Differential diagnosis again reviewed and includes possible alveolitis  (drug induced, autoimmune, environmental) vs residual prior infectious/inflammatory insult, vs aspiration/reflux induced pneumonitis with atypical pulmonary edema being less likely  · His overall stability and lack of symptoms has been noted  · He is currently declining further radiographic tracking/evaluation  · He desires no invasive testing (bronchoscopy) at this time  · RF negative, remainder of serology panel not obtained  · Will plan on follow up in 6 months with spirometry at that time  · Encouraged to call if new symptoms develop or cough worsens

## 2018-03-05 NOTE — PROGRESS NOTES
Pulmonary Follow Up Note   Serbian Para 69 y o  male MRN: [id number]  3/5/2018      Assessment:    Abnormal chest CT  · HRCT chest in 12/2017 was stable  · Differential diagnosis again reviewed and includes possible alveolitis  (drug induced, autoimmune, environmental) vs residual prior infectious/inflammatory insult, vs aspiration/reflux induced pneumonitis with atypical pulmonary edema being less likely  · His overall stability and lack of symptoms has been noted  · He is currently declining further radiographic tracking/evaluation  · He desires no invasive testing (bronchoscopy) at this time  · RF negative, remainder of serology panel not obtained  · Will plan on follow up in 6 months with spirometry at that time  · Encouraged to call if new symptoms develop or cough worsens    Bronchiectasis without complication ([facility] )  · Reported cough with scant nonpurulent sputum production  · Will give trial of Ventolin HFA on as needed basis    Cough  · Denied reflux symptoms  · Declined trial at nasal saline irrigation  · Will give trial of YOSEF  · Monitor for cough progression as sign of progressive ILD  · Spirometry at next visit      Plan:    Diagnoses and all orders for this visit:    Bronchiectasis without complication (HCC)    Abnormal chest CT    Cough  -     albuterol (VENTOLIN HFA) 90 mcg/act inhaler; Inhale 2 puffs every 6 (six) hours as needed for wheezing or shortness of breath for up to 30 days    Other orders  -     aspirin (ECOTRIN) 325 mg EC tablet; Take 1 tablet by mouth daily  -     insulin glargine (BASAGLAR KWIKPEN) injection pen 100 units/mL; Inject under the skin  -     glucose blood (AIMEE CONTOUR TEST) test strip; by In Vitro route daily  -     Insulin Pen Needle (B-D UF III MINI PEN NEEDLES) 31G X 5 MM MISC; by Does not apply route  -     benzonatate (TESSALON PERLES) 100 mg capsule; Take 1 capsule by mouth 3 (three) times a day as needed  -     tadalafil (CIALIS) 20 MG tablet;  Take by mouth  - diazepam (VALIUM) 5 mg tablet; Take 1 tablet by mouth 3 (three) times a day as needed  -     diltiazem (DILACOR XR) 240 MG 24 hr capsule; Take 1 capsule by mouth daily  -     fexofenadine (ALLEGRA) 180 MG tablet; Take 1 tablet by mouth daily as needed  -     Omega-3 Fatty Acids (FISH OIL) 1,000 mg; Take 1 capsule by mouth daily  -     losartan (COZAAR) 50 mg tablet; Take 1 tablet by mouth daily  -     metFORMIN (GLUCOPHAGE-XR) 500 mg 24 hr tablet; Take 1 tablet by mouth 2 (two) times a day  -     simvastatin (ZOCOR) 40 mg tablet; Take 1 tablet by mouth  -     VITAMIN B COMPLEX-C PO; Take 1 tablet by mouth daily  -     Ascorbic Acid (VITAMIN C) 500 MG CAPS; Take 1 capsule by mouth daily  -     levothyroxine 25 mcg tablet; Take 1 tablet by mouth daily        Return in about 6 months (around 9/5/2018) for Recheck  History of Present Illness   HPI:  Fazal Phillips is a 69 y o  male who has CAD post CABG, DM2, previously seen by Dr Noam Sigala for bronchiectasis and left sided GGOs  He was initially seen in June 2016 and has been followed with serial radiographs  He was last seen in September 2017 and plans made for HRCT chest and serology testing  He presents today for follow up  He again reports he felt well  He obtained RA panel but not remainder of serology testing  He denied any dyspnea, purulent sputum production, hemoptysis, pleurisy, weight loss, NS, fevers, edema, orthopnea, rashes, oral lesions, or arthralgias  He did report mild intermittent cough with scant white sputum production  He denied dysphagia, aspiration events, or GERD symptoms  Additional Social history  - remote smoker quit [address] [place] [address], immigrated after war, [employer] - retired  - Negative PPD in past     Review of Systems   Constitutional: Negative for activity change, chills, fever and unexpected weight change     HENT: Negative for congestion, postnasal drip, rhinorrhea, trouble swallowing and voice change  Eyes: Negative for pain, redness and visual disturbance  Respiratory: Positive for cough  Negative for chest tightness, shortness of breath and wheezing  Cardiovascular: Negative for chest pain, palpitations and leg swelling  Gastrointestinal: Negative for abdominal distention, abdominal pain, diarrhea, nausea and vomiting  Endocrine: Negative for cold intolerance and heat intolerance  Genitourinary: Negative for dysuria, frequency and urgency  Musculoskeletal: Positive for gait problem  Negative for arthralgias and myalgias  Skin: Negative for color change, rash and wound  Neurological: Negative for dizziness, syncope, weakness and light-headedness  Hematological: Negative for adenopathy  Psychiatric/Behavioral: Negative for confusion and sleep disturbance         Historical Information   Past Medical History:   Diagnosis Date    3-vessel CAD     Cardiac murmur     Hyperlipidemia     Hypertension     Mitral valve insufficiency      Past Surgical History:   Procedure Laterality Date    CORONARY ARTERY BYPASS GRAFT       Family History   Problem Relation Age of Onset    Diabetes Family          Meds/Allergies     Current Outpatient Prescriptions:     Ascorbic Acid (VITAMIN C) 500 MG CAPS, Take 1 capsule by mouth daily, Disp: , Rfl:     aspirin (ECOTRIN) 325 mg EC tablet, Take 1 tablet by mouth daily, Disp: , Rfl:     benzonatate (TESSALON PERLES) 100 mg capsule, Take 1 capsule by mouth 3 (three) times a day as needed, Disp: , Rfl:     diltiazem (DILACOR XR) 240 MG 24 hr capsule, Take 1 capsule by mouth daily, Disp: , Rfl:     fexofenadine (ALLEGRA) 180 MG tablet, Take 1 tablet by mouth daily as needed, Disp: , Rfl:     glucose blood (AIMEE CONTOUR TEST) test strip, by In Vitro route daily, Disp: , Rfl:     insulin glargine (BASAGLAR KWIKPEN) injection pen 100 units/mL, Inject under the skin, Disp: , Rfl:     Insulin Pen Needle (B-D UF III MINI PEN NEEDLES) 31G X 5 MM MISC, by Does not apply route, Disp: , Rfl:     levothyroxine 25 mcg tablet, Take 1 tablet by mouth daily, Disp: , Rfl:     losartan (COZAAR) 50 mg tablet, Take 1 tablet by mouth daily, Disp: , Rfl:     metFORMIN (GLUCOPHAGE-XR) 500 mg 24 hr tablet, Take 1 tablet by mouth 2 (two) times a day, Disp: , Rfl:     Omega-3 Fatty Acids (FISH OIL) 1,000 mg, Take 1 capsule by mouth daily, Disp: , Rfl:     simvastatin (ZOCOR) 40 mg tablet, Take 1 tablet by mouth, Disp: , Rfl:     tadalafil (CIALIS) 20 MG tablet, Take by mouth, Disp: , Rfl:     VITAMIN B COMPLEX-C PO, Take 1 tablet by mouth daily, Disp: , Rfl:     albuterol (VENTOLIN HFA) 90 mcg/act inhaler, Inhale 2 puffs every 6 (six) hours as needed for wheezing or shortness of breath for up to 30 days, Disp: 1 Inhaler, Rfl: 0    diazepam (VALIUM) 5 mg tablet, Take 1 tablet by mouth 3 (three) times a day as needed, Disp: , Rfl:     levothyroxine 25 mcg tablet, TAKE 1 TABLET BY MOUTH EVERY DAY, Disp: 30 tablet, Rfl: 5  No Known Allergies    Vitals: Blood pressure 118/68, pulse 68, temperature 97 6 °F (36 4 °C), temperature source Tympanic, resp  rate 18, height 5' 5" (1 651 m), weight 52 3 kg (115 lb 3 2 oz), SpO2 93 %  Body mass index is 19 17 kg/m²  Oxygen Therapy  SpO2: 93 % (ra)      Physical Exam  Physical Exam   Constitutional: He is oriented to person, place, and time  He appears well-developed and well-nourished  HENT:   Head: Normocephalic and atraumatic  Right Ear: External ear normal    Left Ear: External ear normal    Mouth/Throat: No oropharyngeal exudate  Mild nasal turbinate erythema and posterior pharyngeal cobblestoning   Eyes: Conjunctivae are normal  Pupils are equal, round, and reactive to light  Right eye exhibits no discharge  Left eye exhibits no discharge  No scleral icterus  Neck: Neck supple  No JVD present  No tracheal deviation present  Cardiovascular: Normal rate and regular rhythm  Murmur heard    II/VI HARRISON LUSB   Pulmonary/Chest: Effort normal and breath sounds normal  No stridor  No respiratory distress  He has no wheezes  He has no rales  Abdominal: Soft  Bowel sounds are normal  He exhibits no distension  There is no tenderness  Musculoskeletal: He exhibits deformity  He exhibits no edema  LLE brace in place   Lymphadenopathy:     He has no cervical adenopathy  Neurological: He is alert and oriented to person, place, and time  Skin: Skin is warm and dry  No rash noted  Psychiatric: He has a normal mood and affect  His behavior is normal    Vitals reviewed  Labs: I have personally reviewed pertinent lab results  Lab Results   Component Value Date    WBC 7 13 2016    HGB 14 6 2016    HCT 42 6 2016    MCV 88 2016     2016     Lab Results   Component Value Date    GLUCOSE 79 2017    CALCIUM 9 1 2018     2018    K 4 2 2018    CO2 26 2018     2018    BUN 14 2018    CREATININE 0 89 2018     No results found for: IGE  Lab Results   Component Value Date    ALT 30 2018    AST 26 2018    ALKPHOS 63 2018    BILITOT 0 50 2018       RA panel negative, CCP pending    Imaging and other studies: I have personally reviewed pertinent reports  and I have personally reviewed pertinent films in PACS  HRCT Chest 17 - no change in left lower lobe bronchiectasis and surrounding ground glass opacities, mild basilar segmental atelectasis, no air trapping, mild stable MONICO GGOs, no ILS thickening, no effusions    Pulmonary function testin17 - Ratio 74%, FVC 2 55L (78%), FEV1 1 89L (79%) - very mild restrictive ventilatory defect with prior FVC 82% predicted in 2016  EKG, Pathology, and Other Studies: I have personally reviewed pertinent reports  TTE 2016 EF 17%, grade I diastolic dysfunction, normal RV, mild-mod MR with eccentric posterior jet    Javon Jurado DO, [facility]

## 2018-03-05 NOTE — PATIENT INSTRUCTIONS
· Trial of ventolin inhaler 2 puffs every 6 hours only as needed for cough or shortness of breath  · Follow up in 6 months with repeat breathing tests in office    Chronic Cough   AMBULATORY CARE:   A chronic cough  is a cough that lasts more than 4 weeks in children or 8 weeks in adults  Signs and symptoms you may also have:   · Wheezing and shortness of breath    · A runny or stuffy nose    · Pain or itching in your throat    · Red, swollen, watery eyes    · A raspy or hoarse voice    · Heartburn or a sour taste in your mouth  Call 911 for any of the following:   · You cough up blood  · You faint when you cough  · You have trouble breathing  Contact your healthcare provider if:   · You have new or worsening symptoms  · You have severe pain when you take a deep breath  · You become very tired after a coughing fit  · You have trouble sleeping because of the coughing  · You have questions or concerns about your condition or care  Treatment for a chronic cough  may depend on the cause  You may need medicine to stop your cough, treat allergies or acid reflux, or decrease swelling in your airways  You will need antibiotics if your cough is caused by a respiratory infection  If you take medicine that causes a chronic cough, it may be stopped or changed  You may need speech therapy  A speech therapist can teach you ways to control your cough  Self-care:   · Prevent acid reflex  Acid reflux can make your chronic cough worse  Raise your head and upper back when you sleep  Place 2 or more pillows behind your head or sleep in a recliner  Do not lie down for at least 1 hour after you eat  Do not have foods or drinks that increase heartburn  Ask your healthcare provider for other ways to prevent acid reflux  · Do not smoke  Encourage your adolescent child not to smoke  Nicotine and other chemicals in cigarettes and cigars can cause lung damage  They can also make your cough worse   Ask your healthcare provider for information if you currently smoke and need help to quit  E-cigarettes or smokeless tobacco still contain nicotine  Talk to your healthcare provider before you use these products  · Stay away from secondhand smoke  Do not let people smoke in your car, home, or near your child  Do not stand near someone that is smoking  This includes anyone that is smoking an E-cigarrete  · Avoid anything that triggers your allergies or irritates your throat  Allergens and irritants can make your chronic cough worse  Allergens may include dust mites, pollen, pet dander, or mold  Wear a mask if you work around pollutants or irritants  Ask your healthcare provider for more ways to decrease your exposure to allergens or irritants  · Drink plenty of liquids as directed  Liquids may help relieve throat discomfort that causes you to cough  Add honey to tea or hot water to help ease your throat pain  Ask how much liquid to drink each day and which liquids are best for you  Follow up with your healthcare provider as directed: You may need to return for more tests  Your healthcare provider may refer to you other specialists  Write down your questions so you remember to ask them during your visits  © 2017 [facility] Information is for End User's use only and may not be sold, redistributed or otherwise used for commercial purposes  All illustrations and images included in CareNotes® are the copyrighted property of [employer] , [employer]  or [employer]gr[employer] Domingo  The above information is an  only  It is not intended as medical advice for individual conditions or treatments  Talk to your doctor, nurse or pharmacist before following any medical regimen to see if it is safe and effective for you

## 2018-03-06 LAB — CCP IGA+IGG SERPL IA-ACNC: 4 UNITS (ref 0–19)

## 2018-03-08 ENCOUNTER — OFFICE VISIT (OUTPATIENT)
Dept: CARDIOLOGY CLINIC | Facility: CLINIC | Age: 68
End: 2018-03-08
Payer: MEDICARE

## 2018-03-08 VITALS
WEIGHT: 116 LBS | BODY MASS INDEX: 18.64 KG/M2 | HEIGHT: 66 IN | HEART RATE: 68 BPM | DIASTOLIC BLOOD PRESSURE: 50 MMHG | OXYGEN SATURATION: 95 % | SYSTOLIC BLOOD PRESSURE: 110 MMHG

## 2018-03-08 DIAGNOSIS — E03.9 HYPOTHYROIDISM, UNSPECIFIED TYPE: Primary | ICD-10-CM

## 2018-03-08 DIAGNOSIS — I34.0 MITRAL VALVE INSUFFICIENCY, UNSPECIFIED ETIOLOGY: ICD-10-CM

## 2018-03-08 DIAGNOSIS — E78.5 DYSLIPIDEMIA: ICD-10-CM

## 2018-03-08 DIAGNOSIS — I10 ESSENTIAL HYPERTENSION: ICD-10-CM

## 2018-03-08 DIAGNOSIS — I25.10 CORONARY ARTERY DISEASE INVOLVING NATIVE CORONARY ARTERY OF NATIVE HEART WITHOUT ANGINA PECTORIS: Primary | ICD-10-CM

## 2018-03-08 PROCEDURE — 99214 OFFICE O/P EST MOD 30 MIN: CPT | Performed by: INTERNAL MEDICINE

## 2018-03-08 NOTE — PATIENT INSTRUCTIONS
Recommendations:  1  Continue current medications  2  Recheck fasting lipid panel and complete metabolic profile in 6 months  3  Follow up in 6 months

## 2018-03-08 NOTE — PROGRESS NOTES
Cardiology   Jeff Barber 69 y o  male MRN: [id number]        Impression:  1  Coronary artery disease s/p CABG - stable  2  Hypertension - good control  3  Mild to moderate mitral regurgitation - stable  Actually mitral regurgitation is improved from last time  4  Dyslipidemia - on statin  Not taking simvastatin on a regular basis  Recommendations:  1  Continue current medications  2  Recheck fasting lipid panel and complete metabolic profile in 6 months  3  Follow up in 6 months  HPI: Jeff Barber is a 69y o  year old male with coronary artery disease, hypertension, and dyslipidemia presents for follow up  Asymptomatic from a cardiac standpoint  No chest pain, shortness of breath, or palpitations  No chest pain, shortness of breath, or palpitations  Echocardiogram 9/28/17 demonstrated normal left ventricular systolic function and mild mitral regurgitation - no change  Review of Systems   Constitutional: Negative  HENT: Negative  Eyes: Negative  Respiratory: Negative for chest tightness and shortness of breath  Cardiovascular: Negative for chest pain, palpitations and leg swelling  Gastrointestinal: Negative  Endocrine: Negative  Genitourinary: Negative  Musculoskeletal: Negative  Skin: Negative  Allergic/Immunologic: Negative  Neurological: Negative  Hematological: Negative  Psychiatric/Behavioral: Negative  All other systems reviewed and are negative          Past Medical History:   Diagnosis Date    3-vessel CAD     Cardiac murmur     Hyperlipidemia     Hypertension     Mitral valve insufficiency      Past Surgical History:   Procedure Laterality Date    CORONARY ARTERY BYPASS GRAFT       History   Alcohol Use No     History   Drug Use No     History   Smoking Status    Former Smoker    Packs/day: 0 50    Years: 30 00    Types: Cigarettes    Quit date: 1978   Smokeless Tobacco    Never Used     Family History   Problem Relation Age of Onset    Adopted: Yes    Diabetes Family        Allergies:   Allergies   Allergen Reactions    Atorvastatin      Other reaction(s): Itching    Levofloxacin      Other reaction(s): GI Upset    Pravastatin      Other reaction(s): Itching       Medications:     Current Outpatient Prescriptions:     albuterol (VENTOLIN HFA) 90 mcg/act inhaler, Inhale 2 puffs every 6 (six) hours as needed for wheezing or shortness of breath for up to 30 days, Disp: 1 Inhaler, Rfl: 0    Ascorbic Acid (VITAMIN C) 500 MG CAPS, Take 1 capsule by mouth daily, Disp: , Rfl:     aspirin (ECOTRIN) 325 mg EC tablet, Take 1 tablet by mouth daily, Disp: , Rfl:     diazepam (VALIUM) 5 mg tablet, Take 1 tablet by mouth 3 (three) times a day as needed, Disp: , Rfl:     diltiazem (DILACOR XR) 240 MG 24 hr capsule, Take 1 capsule by mouth daily, Disp: , Rfl:     fexofenadine (ALLEGRA) 180 MG tablet, Take 1 tablet by mouth daily as needed, Disp: , Rfl:     glucose blood (AIMEE CONTOUR TEST) test strip, by In Vitro route daily, Disp: , Rfl:     insulin glargine (BASAGLAR KWIKPEN) injection pen 100 units/mL, Inject under the skin, Disp: , Rfl:     Insulin Pen Needle (B-D UF III MINI PEN NEEDLES) 31G X 5 MM MISC, by Does not apply route, Disp: , Rfl:     levothyroxine 25 mcg tablet, TAKE 1 TABLET BY MOUTH EVERY DAY, Disp: 30 tablet, Rfl: 5    levothyroxine 25 mcg tablet, Take 1 tablet by mouth daily, Disp: , Rfl:     losartan (COZAAR) 50 mg tablet, Take 1 tablet by mouth daily, Disp: , Rfl:     metFORMIN (GLUCOPHAGE-XR) 500 mg 24 hr tablet, Take 1 tablet by mouth 2 (two) times a day, Disp: , Rfl:     Omega-3 Fatty Acids (FISH OIL) 1,000 mg, Take 1 capsule by mouth daily, Disp: , Rfl:     simvastatin (ZOCOR) 40 mg tablet, Take 1 tablet by mouth, Disp: , Rfl:     tadalafil (CIALIS) 20 MG tablet, Take by mouth, Disp: , Rfl:     VITAMIN B COMPLEX-C PO, Take 1 tablet by mouth daily, Disp: , Rfl:       Wt Readings from Last 3 Encounters: 03/08/18 52 6 kg (116 lb)   03/05/18 52 3 kg (115 lb 3 2 oz)   09/29/17 52 8 kg (116 lb 4 9 oz)     Temp Readings from Last 3 Encounters:   03/05/18 97 6 °F (36 4 °C) (Tympanic)   09/13/17 (!) 97 °F (36 1 °C)   06/07/17 98 2 °F (36 8 °C) (Oral)     BP Readings from Last 3 Encounters:   03/08/18 110/50   03/05/18 118/68   09/29/17 132/70     Pulse Readings from Last 3 Encounters:   03/08/18 68   03/05/18 68   09/29/17 81         Physical Exam   Constitutional: He is oriented to person, place, and time  He appears well-developed  HENT:   Head: Normocephalic  Eyes: EOM are normal    Neck: Normal range of motion  Cardiovascular: Normal rate, regular rhythm and normal heart sounds  Exam reveals no gallop and no friction rub  No murmur heard  Pulmonary/Chest: Effort normal and breath sounds normal  No respiratory distress  He has no wheezes  He has no rales  Musculoskeletal: Normal range of motion  Neurological: He is alert and oriented to person, place, and time  Skin: Skin is warm and dry  Psychiatric: He has a normal mood and affect           Laboratory Studies:  CMP:  Lab Results   Component Value Date     03/03/2018    K 4 2 03/03/2018     03/03/2018    CO2 26 03/03/2018    ANIONGAP 9 03/03/2018    BUN 14 03/03/2018    CREATININE 0 89 03/03/2018    GLUCOSE 79 03/03/2017    AST 26 03/03/2018    ALT 30 03/03/2018    BILITOT 0 50 03/03/2018    EGFR 87 03/03/2018       Lipid Profile:   Lab Results   Component Value Date    CHOL 219 (H) 03/03/2018     Lab Results   Component Value Date    HDL 52 03/03/2018     Lab Results   Component Value Date    LDLCALC 138 (H) 03/03/2018     Lab Results   Component Value Date    TRIG 143 03/03/2018       Cardiac testing:     Results for orders placed during the hospital encounter of 09/28/17   Echo complete with contrast if indicated    [facility] [address]  [phone]    Transthoracic Echocardiogram  2D, M-mode, Doppler, and Color Doppler    Study date:  28-Sep-2017    Patient: Sean Barajas  MR number: [id number]  Account number: [de-identified]  : 1950  Age: 74 years  Gender: Male  Status: Outpatient  Location: [address] [place] [address]  Height: 64 in  Weight: 112 9 lb  BP: 120/ 70 mmHg    Indications: Cardiac murmur    Diagnoses: R01 1 - Cardiac murmur, unspecified    Sonographer:  ADELAIDA Fisher, RDCS  Referring Physician:  Ksenia Bounre DO  Group:  [facility]  Interpreting Physician:  Tricia Ahumada MD    SUMMARY    LEFT VENTRICLE:  Systolic function was normal  Ejection fraction was estimated to be 60 %  There were no regional wall motion abnormalities  RIGHT VENTRICLE:  The size was normal   Systolic function was mildly reduced  MITRAL VALVE:  There was mild regurgitation  TRICUSPID VALVE:  There was mild regurgitation  Pulmonary artery systolic pressure was within the normal range  PULMONIC VALVE:  There was trace regurgitation  HISTORY: PRIOR HISTORY: CAD; CABG; Hyperlipidemia; Hypertension; Hypothyroid; Diabetes Mellitus; Post polio syndrome    PROCEDURE: The study was performed in the [address] [place] [address]  This was a routine study  The transthoracic approach was used  The study included complete 2D imaging, M-mode, complete spectral Doppler, and color Doppler  The  heart rate was 65 bpm, at the start of the study  Images were obtained from the parasternal, apical, subcostal, and suprasternal notch acoustic windows  Image quality was adequate  LEFT VENTRICLE: Size was normal  Systolic function was normal  Ejection fraction was estimated to be 60 %  There were no regional wall motion abnormalities  Wall thickness was normal  DOPPLER: Left ventricular diastolic function parameters  were normal     VENTRICULAR SEPTUM: There was normal motion  There was normal contour      RIGHT VENTRICLE: The size was normal  Systolic function was mildly reduced  Wall thickness was normal     LEFT ATRIUM: Size was normal     RIGHT ATRIUM: Size was normal     MITRAL VALVE: There was mild thickening of the valve  There was normal leaflet separation  DOPPLER: The transmitral velocity was within the normal range  There was no evidence for stenosis  There was mild regurgitation  AORTIC VALVE: The valve was trileaflet  Leaflets exhibited mildly increased thickness and lower normal cuspal separation  DOPPLER: Transaortic velocity was within the normal range  There was no evidence for stenosis  There was no  regurgitation  TRICUSPID VALVE: The valve structure was normal  There was normal leaflet separation  DOPPLER: The transtricuspid velocity was within the normal range  There was no evidence for stenosis  There was mild regurgitation  Pulmonary artery  systolic pressure was within the normal range  PULMONIC VALVE: Leaflets exhibited normal thickness, no calcification, and normal cuspal separation  DOPPLER: The transpulmonic velocity was within the normal range  There was trace regurgitation  PERICARDIUM: There was no pericardial effusion  AORTA: The root exhibited normal size      SYSTEM MEASUREMENT TABLES    2D  %FS: 33 82 %  AV Diam: 3 01 cm  EDV(Teich): 24 47 ml  EF(Cube): 71 01 %  EF(Teich): 64 83 %  ESV(Cube): 5 06 ml  ESV(Teich): 8 61 ml  IVSd: 1 38 cm  LA Area: 16 89 cm2  LA Diam: 4 28 cm  LVEDV MOD A4C: 62 16 ml  LVEF MOD A4C: 61 5 %  LVESV MOD A4C: 23 93 ml  LVIDd: 2 59 cm  LVIDs: 1 72 cm  LVLd A4C: 7 32 cm  LVLs A4C: 5 72 cm  LVPWd: 1 08 cm  RA Area: 9 22 cm2  RV Diam: 3 26 cm  SI(Cube): 8 05 ml/m2  SI(Teich): 10 3 ml/m2  SV MOD A4C: 38 23 ml  SV(Cube): 12 4 ml  SV(Teich): 15 87 ml    CW  TR MaxP 57 mmHg  TR Vmax: 2 14 m/s    MM  TAPSE: 1 4 cm    PW  E': 0 05 m/s  E/E': 25 04  MV A [place]ilo: 1 34 m/s  MV Dec [place]: 5 38 m/s2  MV DecT: 251 7 ms  MV E Camilo: 1 35 m/s  MV E/A Ratio: 1 01    [employer] Accredited Echocardiography Laboratory    Prepared and electronically signed by    Tricia Ahumada MD  Signed [id number] 17:41:27

## 2018-03-15 ENCOUNTER — APPOINTMENT (OUTPATIENT)
Dept: LAB | Facility: CLINIC | Age: 68
End: 2018-03-15
Payer: MEDICARE

## 2018-03-15 DIAGNOSIS — E03.9 HYPOTHYROIDISM, UNSPECIFIED TYPE: ICD-10-CM

## 2018-03-15 LAB — TSH SERPL DL<=0.05 MIU/L-ACNC: 4.8 UIU/ML (ref 0.36–3.74)

## 2018-03-15 PROCEDURE — 36415 COLL VENOUS BLD VENIPUNCTURE: CPT

## 2018-03-15 PROCEDURE — 84443 ASSAY THYROID STIM HORMONE: CPT

## 2018-03-19 DIAGNOSIS — E03.9 HYPOTHYROIDISM, UNSPECIFIED TYPE: Primary | ICD-10-CM

## 2018-03-19 RX ORDER — LEVOTHYROXINE SODIUM 0.05 MG/1
50 TABLET ORAL DAILY
Qty: 30 TABLET | Refills: 4 | Status: SHIPPED | OUTPATIENT
Start: 2018-03-19 | End: 2018-09-06 | Stop reason: SDUPTHER

## 2018-04-11 RX ORDER — DILTIAZEM HYDROCHLORIDE 240 MG/1
240 CAPSULE, COATED, EXTENDED RELEASE ORAL DAILY
Refills: 3 | COMMUNITY
Start: 2018-04-05 | End: 2018-09-26 | Stop reason: SDUPTHER

## 2018-04-13 ENCOUNTER — OFFICE VISIT (OUTPATIENT)
Dept: INTERNAL MEDICINE CLINIC | Facility: CLINIC | Age: 68
End: 2018-04-13
Payer: MEDICARE

## 2018-04-13 VITALS
DIASTOLIC BLOOD PRESSURE: 62 MMHG | TEMPERATURE: 78 F | SYSTOLIC BLOOD PRESSURE: 132 MMHG | WEIGHT: 117.4 LBS | HEIGHT: 66 IN | RESPIRATION RATE: 16 BRPM | HEART RATE: 63 BPM | BODY MASS INDEX: 18.87 KG/M2 | OXYGEN SATURATION: 98 %

## 2018-04-13 DIAGNOSIS — I10 ESSENTIAL HYPERTENSION: ICD-10-CM

## 2018-04-13 DIAGNOSIS — E13.9 DIABETES 1.5, MANAGED AS TYPE 2 (HCC): Primary | ICD-10-CM

## 2018-04-13 DIAGNOSIS — E03.9 HYPOTHYROIDISM, UNSPECIFIED TYPE: ICD-10-CM

## 2018-04-13 DIAGNOSIS — E78.5 DYSLIPIDEMIA: ICD-10-CM

## 2018-04-13 PROCEDURE — 99214 OFFICE O/P EST MOD 30 MIN: CPT | Performed by: INTERNAL MEDICINE

## 2018-04-13 NOTE — PROGRESS NOTES
Assessment/Plan:         Diagnoses and all orders for this visit:    Diabetes 1 5, managed as type 2 ([facility] [address] )  Comments:  I have counselled the pt to follow a healthy and balanced diet ,and recommend routine exercise  Continue with current medical regiment will continue to monitor  Orders:  -     Comprehensive metabolic panel; Future  -     HEMOGLOBIN A1C W/ EAG ESTIMATION; Future  -     Lipid Panel with Direct LDL reflex; Future  -     Microalbumin / creatinine urine ratio; Future    Hypothyroidism, unspecified type  Comments:  Restart usual medication pharmacy accidentally gave him a different patient's medication (wellbutrin-which was d/c ed) recheck TSH 4 weeks    Dyslipidemia  Comments:  Hyperlipidemia controlled continue with current medical regiment recommend a low-cholesterol diet and recommend routine exercise we will continue to monitor     Essential hypertension  Comments:  Hypertension - controlled, I have counseled patient following healthy balance diet, I would like the patient reduce sodium, exercise routinely        Return to office 6  months  call if any problems  Subjective:      Patient ID: Maxwell Baldwin is a 69 y o  male  HPI 68-year old male coming in for a follow up visit regarding type 2 diabetes, hypothyroidism, hyperlipidemia, hypertension; The patient reports me compliant taking medications without untoward side effects the  The patient is here to review his medical condition, update me on the medical condition and the patient reports me no hospitalizations and no ER visits  Patient does report me he received Wellbutrin at the pharmacy when we increased his levothyroxine, we have contacted the pharmacy and they have explained to us that he had received another person's medication by the same name  They will correct this mistake patient will return to the pharmacy is afternoon to  his prescription for levothyroxine and he will discontinue the Wellbutrin    He reports me overall he is feeling fine  He is here to review his laboratories  The following portions of the patient's history were reviewed and updated as appropriate: allergies, past family history, past medical history, past social history, past surgical history and problem list     Review of Systems   Constitutional: Negative for activity change, appetite change, chills, fever and unexpected weight change  HENT: Negative for hearing loss and postnasal drip  Eyes: Negative for pain  Respiratory: Negative for cough and shortness of breath  Cardiovascular: Negative for chest pain  Gastrointestinal: Negative for abdominal distention, abdominal pain, diarrhea, nausea and vomiting  Neurological: Negative for dizziness, light-headedness and headaches  Psychiatric/Behavioral: Negative for suicidal ideas  Objective:      /62 (BP Location: Left arm, Patient Position: Sitting, Cuff Size: Standard)   Pulse 63   Temp (!) 78 °F (25 6 °C)   Resp 16   Ht 5' 6" (1 676 m)   Wt 53 3 kg (117 lb 6 4 oz)   SpO2 98%   BMI 18 95 kg/m²          Physical Exam   Constitutional: He appears well-developed and well-nourished  No distress  HENT:   Head: Normocephalic and atraumatic  Right Ear: External ear normal    Left Ear: External ear normal    Mouth/Throat: Oropharynx is clear and moist    Eyes: Conjunctivae are normal  Pupils are equal, round, and reactive to light  Right eye exhibits no discharge  Left eye exhibits no discharge  No scleral icterus  Neck: Neck supple  Cardiovascular: Normal rate, regular rhythm and normal heart sounds  Exam reveals no gallop and no friction rub  No murmur heard  Pulmonary/Chest: No respiratory distress  He has no wheezes  He has no rales  Abdominal: Soft  Bowel sounds are normal  He exhibits no distension and no mass  There is no tenderness  There is no rebound and no guarding  Musculoskeletal: He exhibits no edema or deformity     Lymphadenopathy:     He has no cervical adenopathy  Neurological: He is alert  Skin: He is not diaphoretic  Psychiatric: He has a normal mood and affect

## 2018-09-06 DIAGNOSIS — E03.9 HYPOTHYROIDISM, UNSPECIFIED TYPE: ICD-10-CM

## 2018-09-06 RX ORDER — LEVOTHYROXINE SODIUM 0.05 MG/1
TABLET ORAL
Qty: 30 TABLET | Refills: 4 | Status: SHIPPED | OUTPATIENT
Start: 2018-09-06 | End: 2019-01-06 | Stop reason: SDUPTHER

## 2018-09-26 DIAGNOSIS — I10 ESSENTIAL HYPERTENSION: Primary | ICD-10-CM

## 2018-09-26 RX ORDER — DILTIAZEM HYDROCHLORIDE 240 MG/1
CAPSULE, COATED, EXTENDED RELEASE ORAL
Qty: 90 CAPSULE | Refills: 3 | Status: SHIPPED | OUTPATIENT
Start: 2018-09-26 | End: 2019-09-11 | Stop reason: SDUPTHER

## 2018-10-12 ENCOUNTER — APPOINTMENT (OUTPATIENT)
Dept: LAB | Facility: CLINIC | Age: 68
End: 2018-10-12
Payer: MEDICARE

## 2018-10-12 DIAGNOSIS — E13.9 DIABETES 1.5, MANAGED AS TYPE 2 (HCC): ICD-10-CM

## 2018-10-12 LAB
ALBUMIN SERPL BCP-MCNC: 3.8 G/DL (ref 3.5–5)
ALP SERPL-CCNC: 63 U/L (ref 46–116)
ALT SERPL W P-5'-P-CCNC: 29 U/L (ref 12–78)
ANION GAP SERPL CALCULATED.3IONS-SCNC: 9 MMOL/L (ref 4–13)
AST SERPL W P-5'-P-CCNC: 23 U/L (ref 5–45)
BILIRUB SERPL-MCNC: 0.3 MG/DL (ref 0.2–1)
BUN SERPL-MCNC: 19 MG/DL (ref 5–25)
CALCIUM SERPL-MCNC: 9.3 MG/DL (ref 8.3–10.1)
CHLORIDE SERPL-SCNC: 101 MMOL/L (ref 100–108)
CHOLEST SERPL-MCNC: 161 MG/DL (ref 50–200)
CO2 SERPL-SCNC: 24 MMOL/L (ref 21–32)
CREAT SERPL-MCNC: 0.94 MG/DL (ref 0.6–1.3)
CREAT UR-MCNC: 108 MG/DL
EST. AVERAGE GLUCOSE BLD GHB EST-MCNC: 134 MG/DL
GFR SERPL CREATININE-BSD FRML MDRD: 82 ML/MIN/1.73SQ M
GLUCOSE P FAST SERPL-MCNC: 113 MG/DL (ref 65–99)
HBA1C MFR BLD: 6.3 % (ref 4.2–6.3)
HDLC SERPL-MCNC: 60 MG/DL (ref 40–60)
LDLC SERPL CALC-MCNC: 87 MG/DL (ref 0–100)
MICROALBUMIN UR-MCNC: 80.6 MG/L (ref 0–20)
MICROALBUMIN/CREAT 24H UR: 75 MG/G CREATININE (ref 0–30)
POTASSIUM SERPL-SCNC: 4.5 MMOL/L (ref 3.5–5.3)
PROT SERPL-MCNC: 7.8 G/DL (ref 6.4–8.2)
SODIUM SERPL-SCNC: 134 MMOL/L (ref 136–145)
TRIGL SERPL-MCNC: 68 MG/DL

## 2018-10-12 PROCEDURE — 83036 HEMOGLOBIN GLYCOSYLATED A1C: CPT

## 2018-10-12 PROCEDURE — 80061 LIPID PANEL: CPT

## 2018-10-12 PROCEDURE — 36415 COLL VENOUS BLD VENIPUNCTURE: CPT | Performed by: INTERNAL MEDICINE

## 2018-10-12 PROCEDURE — 82043 UR ALBUMIN QUANTITATIVE: CPT

## 2018-10-12 PROCEDURE — 82570 ASSAY OF URINE CREATININE: CPT

## 2018-10-12 PROCEDURE — 80053 COMPREHEN METABOLIC PANEL: CPT | Performed by: INTERNAL MEDICINE

## 2018-10-18 ENCOUNTER — OFFICE VISIT (OUTPATIENT)
Dept: INTERNAL MEDICINE CLINIC | Facility: CLINIC | Age: 68
End: 2018-10-18
Payer: MEDICARE

## 2018-10-18 VITALS
DIASTOLIC BLOOD PRESSURE: 62 MMHG | OXYGEN SATURATION: 98 % | SYSTOLIC BLOOD PRESSURE: 122 MMHG | RESPIRATION RATE: 16 BRPM | HEART RATE: 66 BPM

## 2018-10-18 DIAGNOSIS — Z12.5 SCREENING PSA (PROSTATE SPECIFIC ANTIGEN): ICD-10-CM

## 2018-10-18 DIAGNOSIS — E78.5 DYSLIPIDEMIA: ICD-10-CM

## 2018-10-18 DIAGNOSIS — I10 ESSENTIAL HYPERTENSION: Primary | ICD-10-CM

## 2018-10-18 DIAGNOSIS — R05.9 COUGH: ICD-10-CM

## 2018-10-18 DIAGNOSIS — N52.9 ERECTILE DYSFUNCTION, UNSPECIFIED ERECTILE DYSFUNCTION TYPE: ICD-10-CM

## 2018-10-18 DIAGNOSIS — I25.10 CORONARY ARTERY DISEASE INVOLVING NATIVE CORONARY ARTERY OF NATIVE HEART WITHOUT ANGINA PECTORIS: ICD-10-CM

## 2018-10-18 DIAGNOSIS — E13.9 DIABETES 1.5, MANAGED AS TYPE 2 (HCC): ICD-10-CM

## 2018-10-18 PROCEDURE — 99214 OFFICE O/P EST MOD 30 MIN: CPT | Performed by: INTERNAL MEDICINE

## 2018-10-18 RX ORDER — SILDENAFIL CITRATE 20 MG/1
TABLET ORAL
Qty: 30 TABLET | Refills: 3 | Status: SHIPPED | OUTPATIENT
Start: 2018-10-18 | End: 2019-09-07 | Stop reason: SDUPTHER

## 2018-10-18 RX ORDER — ALBUTEROL SULFATE 90 UG/1
2 AEROSOL, METERED RESPIRATORY (INHALATION) EVERY 6 HOURS PRN
Qty: 18 G | Refills: 0 | Status: ON HOLD | OUTPATIENT
Start: 2018-10-18 | End: 2019-03-27 | Stop reason: ALTCHOICE

## 2018-10-18 NOTE — PROGRESS NOTES
Assessment/Plan:           Problem List Items Addressed This Visit        Endocrine    Diabetes 1 5, managed as type 2 ([facility] [address] )     Lab Results   Component Value Date    HGBA1C 6 3 10/12/2018       No results for input(s): POCGLU in the last 72 hours  Blood Sugar Average: Last 72 hrs:   will controlled, I have counselled the pt to follow a healthy and balanced diet ,and recommend routine exercise  I will be ordering diabetic laboratories including comprehensive metabolic panel, hemoglobin A1c, urine microalbumin, lipid panel  Will continue monitor  Relevant Orders    Ambulatory referral to Ophthalmology    Comprehensive metabolic panel    Hemoglobin A1C    Lipid Panel with Direct LDL reflex    Microalbumin / creatinine urine ratio       Cardiovascular and Mediastinum    Coronary artery disease involving native coronary artery     Stable doing well we will continue medical regiment will continue monitor asymptomatic         Relevant Medications    sildenafil (REVATIO) 20 mg tablet    Essential hypertension - Primary     Hypertension - controlled, I have counseled patient following healthy balance diet, I would like the patient reduce sodium, exercise routinely, I would like the patient continued the med current medical regiment and we will continue to monitor  Genitourinary    Erectile dysfunction     Patient does request a prescription for generic sildenafil 20 mg once a day as needed         Relevant Medications    sildenafil (REVATIO) 20 mg tablet       Other    Cough     Patient does get a cough variant asthma currently stable but he does request refill of his rescue inhaler Ventolin HFA 2 puffs every 4-6 hours as needed         Relevant Medications    albuterol (VENTOLIN HFA) 90 mcg/act inhaler    Dyslipidemia     Hyperlipidemia controlled continue with current medical regiment recommend a low-cholesterol diet and recommend routine exercise we will continue to monitor the progress  Screening PSA (prostate specific antigen)     Counseled, will check screening PSA         Relevant Orders    PSA, Total Screen          Return to office 6  months  call if any problems  Subjective:      Patient ID: Rigoberto Drew is a 69 y o  male  HPI 69 male essential hypertension, coronary artery disease, type 2 diabetes, hyperlipidemia, cough variant asthma, erectile dysfunction; The patient reports me compliant taking medications without untoward side effects the  The patient is here to review his medical condition, update me on the medical condition and the patient reports me no hospitalizations and no ER visits  He is here to review his laboratories  He is requesting generic Viagra he cannot afford the Cialis anymore  Overall he is doing well he continues to follow up very healthy diet and continues to be active  The following portions of the patient's history were reviewed and updated as appropriate: allergies, current medications, past family history, past medical history, past social history, past surgical history and problem list     Review of Systems   Constitutional: Negative for activity change, appetite change and unexpected weight change  HENT: Negative for congestion and postnasal drip  Eyes: Negative for visual disturbance  Respiratory: Negative for cough and shortness of breath  Cardiovascular: Negative for chest pain  Gastrointestinal: Negative for abdominal pain, diarrhea, nausea and vomiting  Genitourinary:        Erectile dysfunction   Neurological: Negative for dizziness, light-headedness and headaches  Hematological: Negative for adenopathy  Objective:      /62 (BP Location: Left arm, Patient Position: Sitting, Cuff Size: Standard)   Pulse 66   Resp 16   SpO2 98%          Physical Exam   Constitutional: He appears well-developed and well-nourished  No distress  HENT:   Head: Normocephalic and atraumatic     Right Ear: External ear normal    Left Ear: External ear normal    Mouth/Throat: Oropharynx is clear and moist    Eyes: Pupils are equal, round, and reactive to light  Conjunctivae are normal  Right eye exhibits no discharge  Left eye exhibits no discharge  No scleral icterus  Neck: Neck supple  Cardiovascular: Normal rate, regular rhythm and normal heart sounds  Exam reveals no gallop and no friction rub  No murmur heard  Pulmonary/Chest: No respiratory distress  He has no wheezes  He has no rales  Abdominal: Soft  Bowel sounds are normal  He exhibits no distension and no mass  There is no tenderness  There is no rebound and no guarding  Musculoskeletal: He exhibits no edema or deformity  Lymphadenopathy:     He has no cervical adenopathy  Neurological: He is alert  Skin: He is not diaphoretic  Psychiatric: He has a normal mood and affect

## 2018-10-21 PROBLEM — E13.9 DIABETES 1.5, MANAGED AS TYPE 2 (HCC): Status: ACTIVE | Noted: 2018-10-21

## 2018-10-21 PROBLEM — N52.9 ERECTILE DYSFUNCTION: Status: ACTIVE | Noted: 2018-10-21

## 2018-10-21 PROBLEM — Z12.5 SCREENING PSA (PROSTATE SPECIFIC ANTIGEN): Status: ACTIVE | Noted: 2018-10-21

## 2018-10-21 NOTE — ASSESSMENT & PLAN NOTE
Patient does get a cough variant asthma currently stable but he does request refill of his rescue inhaler Ventolin HFA 2 puffs every 4-6 hours as needed

## 2018-10-21 NOTE — ASSESSMENT & PLAN NOTE
Lab Results   Component Value Date    HGBA1C 6 3 10/12/2018       No results for input(s): POCGLU in the last 72 hours  Blood Sugar Average: Last 72 hrs:   will controlled, I have counselled the pt to follow a healthy and balanced diet ,and recommend routine exercise  I will be ordering diabetic laboratories including comprehensive metabolic panel, hemoglobin A1c, urine microalbumin, lipid panel  Will continue monitor

## 2018-11-09 ENCOUNTER — OFFICE VISIT (OUTPATIENT)
Dept: PULMONOLOGY | Facility: CLINIC | Age: 68
End: 2018-11-09
Payer: MEDICARE

## 2018-11-09 VITALS
WEIGHT: 110 LBS | DIASTOLIC BLOOD PRESSURE: 60 MMHG | TEMPERATURE: 96.5 F | BODY MASS INDEX: 17.68 KG/M2 | RESPIRATION RATE: 18 BRPM | HEIGHT: 66 IN | HEART RATE: 81 BPM | OXYGEN SATURATION: 93 % | SYSTOLIC BLOOD PRESSURE: 140 MMHG

## 2018-11-09 DIAGNOSIS — R05.9 COUGH: Primary | ICD-10-CM

## 2018-11-09 DIAGNOSIS — R93.89 ABNORMAL CHEST CT: ICD-10-CM

## 2018-11-09 DIAGNOSIS — J47.9 BRONCHIECTASIS WITHOUT COMPLICATION (HCC): ICD-10-CM

## 2018-11-09 PROCEDURE — 94010 BREATHING CAPACITY TEST: CPT | Performed by: NURSE PRACTITIONER

## 2018-11-09 PROCEDURE — 99214 OFFICE O/P EST MOD 30 MIN: CPT | Performed by: NURSE PRACTITIONER

## 2018-11-09 NOTE — PROGRESS NOTES
Pulmonary Follow-Up Note   Donny Dawkins 69 y o  male MRN: [id number]  11/9/2018      Assessment/Plan:    Problem List Items Addressed This Visit        Respiratory    Bronchiectasis without complication (HCC)     Cough remains stable  Continue Ventolin as needed  Other    Abnormal chest CT     Pulmonary symptoms remain stable  Radha again declines any repeat imaging  He was agreeable to spirometry in the office today with results below  No further intervention at this time  Continue Ventolin as needed  He is aware to call with any increase or change in symptoms  Cough - Primary     Cough is stable  Plan as listed  Relevant Orders    POCT spirometry        Education provided at this visit:   Need for Vaccination: Declines flu vaccine today - will get at [facility] soon  Inhaler Use: Reviewed proper use and technique of Ventolin PRN  Return in about 2 months (around 1/9/2019), or if symptoms worsen or fail to improve  All of Radha's questions were answered prior to leaving the office today  He will follow-up with Dr Loretta Lantigua in the Spring - he declines any sooner follow-up because he does not like to go out in the cold  He is aware to call our office with any further questions or concerns in the interim, or to be seen sooner as needed  History of Present Illness   Reason for Visit: Follow-Up  Chief Complaint: "I feel good "  HPI: Donny Dawkins is a 69 y o  male who presents to the office today for follow-up of cough and abnormal CT chest   He reports that overall he is feeling well since he last saw Dr Loretta Lantigua in March 2018  At baseline, he reports he has a cough only when it is winter due to poor circulation in his home  He reports during the winter, he has a cough productive of white mucus in the morning that quickly resolves  He denies any purulent sputum production or hemoptysis  He states his cough is currently not present  He denies any chest pain or tightness    He does occasionally have acid reflux symptoms and treats this with a home remedy of chopped garlic drank with water  He denies any shortness of breath or activity limitation  He denies any fevers, chills, night sweats  He reports his weight has remained stable  He denies any other complaints  He does report that he has a Ventolin inhaler to use as needed  He reports he uses it less than 3 times yearly  He had a refill approximately three weeks ago and has not used it yet  Review of Systems   All other systems reviewed and are negative  A full 12-point review of systems was completed and is negative except for those outlined in the HPI  Historical Information   Past Medical History:   Diagnosis Date    3-vessel CAD     CAP (community acquired pneumonia)     Last assessed - 3/15/16    Cardiac murmur     Hyperlipidemia     Hypertension     Methicillin resistant Staphylococcus aureus infection     Mitral valve insufficiency     Nonspecific abnormal results of function study of thyroid     Last assessed - 10/15/14     Past Surgical History:   Procedure Laterality Date    CARDIAC SURGERY  1997    CORONARY ARTERY BYPASS GRAFT      x3; Last assessed - 9/29/17     Family History   Problem Relation Age of Onset    Adopted:  Yes    Diabetes Family     No Known Problems Mother      Social History   History   Alcohol Use No     History   Drug Use No     History   Smoking Status    Former Smoker    Packs/day: 0 50    Years: 30 00    Types: Cigarettes    Quit date: 1978   Smokeless Tobacco    Never Used     Comment: Quit in 1985 per [employer]        Meds/Allergies     Current Outpatient Prescriptions:     Ascorbic Acid (VITAMIN C) 500 MG CAPS, Take 1 capsule by mouth daily, Disp: , Rfl:     aspirin (ECOTRIN) 325 mg EC tablet, Take 1 tablet by mouth daily, Disp: , Rfl:     diazepam (VALIUM) 5 mg tablet, Take 1 tablet by mouth 3 (three) times a day as needed, Disp: , Rfl:     diltiazem (CARDIZEM CD) 240 mg 24 hr capsule, TAKE ONE CAPSULE BY MOUTH EVERY DAY, Disp: 90 capsule, Rfl: 3    fexofenadine (ALLEGRA) 180 MG tablet, Take 1 tablet by mouth daily as needed, Disp: , Rfl:     glucose blood (AIMEE CONTOUR TEST) test strip, by In Vitro route daily, Disp: , Rfl:     insulin glargine (BASAGLAR KWIKPEN) injection pen 100 units/mL, Inject under the skin, Disp: , Rfl:     Insulin Pen Needle (B-D UF III MINI PEN NEEDLES) 31G X 5 MM MISC, by Does not apply route, Disp: , Rfl:     levothyroxine 50 mcg tablet, TAKE 1 TABLET BY MOUTH DAILY, Disp: 30 tablet, Rfl: 4    losartan (COZAAR) 50 mg tablet, Take 1 tablet by mouth daily, Disp: , Rfl:     metFORMIN (GLUCOPHAGE-XR) 500 mg 24 hr tablet, Take 1 tablet by mouth 2 (two) times a day, Disp: , Rfl:     Omega-3 Fatty Acids (FISH OIL) 1,000 mg, Take 1 capsule by mouth daily, Disp: , Rfl:     sildenafil (REVATIO) 20 mg tablet, Once a day prn, Disp: 30 tablet, Rfl: 3    simvastatin (ZOCOR) 40 mg tablet, Take 1 tablet by mouth, Disp: , Rfl:     VITAMIN B COMPLEX-C PO, Take 1 tablet by mouth daily, Disp: , Rfl:     albuterol (VENTOLIN HFA) 90 mcg/act inhaler, Inhale 2 puffs every 6 (six) hours as needed for wheezing (Patient not taking: Reported on 11/9/2018 ), Disp: 18 g, Rfl: 0  Allergies   Allergen Reactions    Atorvastatin      Other reaction(s): Itching    Levofloxacin      Other reaction(s): GI Upset    Pravastatin      Other reaction(s): Itching       Vitals: Blood pressure 140/60, pulse 81, temperature (!) 96 5 °F (35 8 °C), temperature source Tympanic, resp  rate 18, height 5' 6" (1 676 m), weight 49 9 kg (110 lb), SpO2 93 %  Body mass index is 17 75 kg/m²  Oxygen Therapy  SpO2: 93 %    Physical Exam:  Physical Exam   Constitutional: He is oriented to person, place, and time  He appears well-developed  He is cooperative  Non-toxic appearance  No distress  HENT:   Head: Normocephalic and atraumatic  Mouth/Throat: No oropharyngeal exudate     Eyes: EOM are normal  No scleral icterus  Neck: Neck supple  No JVD present  No tracheal deviation present  Cardiovascular: Normal rate, regular rhythm, S1 normal and S2 normal   Exam reveals no gallop and no friction rub  No murmur heard  Pulmonary/Chest: Effort normal and breath sounds normal  No accessory muscle usage or stridor  No tachypnea  No respiratory distress  He has no decreased breath sounds  He has no wheezes  He has no rhonchi  He has no rales  He exhibits no tenderness  Abdominal: Soft  Bowel sounds are normal  He exhibits no distension  There is no tenderness  There is no rebound and no guarding  Musculoskeletal: He exhibits no edema or tenderness  Neurological: He is alert and oriented to person, place, and time  He has normal strength  GCS eye subscore is 4  GCS verbal subscore is 5  GCS motor subscore is 6  Skin: Skin is warm and dry  No abrasion, no ecchymosis, no lesion and no rash noted  He is not diaphoretic  No cyanosis or erythema  Nails show no clubbing  Psychiatric: He has a normal mood and affect  His speech is normal and behavior is normal    Vitals reviewed  Labs: None new    Imaging and other studies: None new    Pulmonary Results (PFTs, PSG): None new     Spirometry from today 11/9/2018:   Spirometry shows restriction with FVC 72% predicted, previously 78% predicted  HILLARY Sales  [facility]        Portions of the record may have been created with voice recognition software  Occasional wrong word or "sound a like" substitutions may have occurred due to the inherent limitations of voice recognition software  Read the chart carefully and recognize, using context, where substitutions have occurred or contact the dictating provider

## 2018-11-09 NOTE — ASSESSMENT & PLAN NOTE
Pulmonary symptoms remain stable  Radha again declines any repeat imaging  He was agreeable to spirometry in the office today with results below  No further intervention at this time  Continue Ventolin as needed  He is aware to call with any increase or change in symptoms

## 2018-11-27 LAB
LEFT EYE DIABETIC RETINOPATHY: NORMAL
RIGHT EYE DIABETIC RETINOPATHY: NORMAL
SEVERITY (EYE EXAM): NORMAL

## 2018-11-29 ENCOUNTER — OFFICE VISIT (OUTPATIENT)
Dept: CARDIOLOGY CLINIC | Facility: CLINIC | Age: 68
End: 2018-11-29
Payer: MEDICARE

## 2018-11-29 VITALS
DIASTOLIC BLOOD PRESSURE: 58 MMHG | BODY MASS INDEX: 18.95 KG/M2 | HEIGHT: 66 IN | SYSTOLIC BLOOD PRESSURE: 124 MMHG | HEART RATE: 86 BPM | WEIGHT: 117.9 LBS | OXYGEN SATURATION: 91 %

## 2018-11-29 DIAGNOSIS — I34.0 MITRAL VALVE INSUFFICIENCY, UNSPECIFIED ETIOLOGY: ICD-10-CM

## 2018-11-29 DIAGNOSIS — I10 ESSENTIAL HYPERTENSION: ICD-10-CM

## 2018-11-29 DIAGNOSIS — I25.10 CORONARY ARTERY DISEASE INVOLVING NATIVE CORONARY ARTERY OF NATIVE HEART WITHOUT ANGINA PECTORIS: ICD-10-CM

## 2018-11-29 DIAGNOSIS — Z95.1 S/P CABG (CORONARY ARTERY BYPASS GRAFT): ICD-10-CM

## 2018-11-29 DIAGNOSIS — I25.10 CAD IN NATIVE ARTERY: Primary | ICD-10-CM

## 2018-11-29 PROCEDURE — 99214 OFFICE O/P EST MOD 30 MIN: CPT | Performed by: INTERNAL MEDICINE

## 2018-11-29 PROCEDURE — 93000 ELECTROCARDIOGRAM COMPLETE: CPT | Performed by: INTERNAL MEDICINE

## 2018-11-29 RX ORDER — DIAZEPAM 5 MG/1
5 TABLET ORAL 3 TIMES DAILY PRN
Qty: 30 TABLET | Refills: 0 | Status: SHIPPED | OUTPATIENT
Start: 2018-11-29 | End: 2018-12-12 | Stop reason: SDUPTHER

## 2018-11-29 NOTE — PROGRESS NOTES
Cardiology   Jenna Salomon 69 y o  male MRN: [id number]      Impression:  1  Coronary artery disease s/p CABG - stable  2  Hypertension - good control  3  Mild to moderate mitral regurgitation - stable  Actually mitral regurgitation is improved from last time  4  Dyslipidemia - on statin           Recommendations:  1  Continue current medications  2  Follow up in 9 months  HPI: Jenna Salomon is a 69y o  year old male with coronary artery disease, hypertension, and dyslipidemia presents for follow up  Asymptomatic from a cardiac standpoint  No chest pain, shortness of breath, or palpitations  No chest pain, shortness of breath, or palpitations  Echocardiogram 9/28/17 demonstrated normal left ventricular systolic function and mild mitral regurgitation - no change  Review of Systems   Constitutional: Negative  HENT: Negative  Eyes: Negative  Respiratory: Negative for chest tightness and shortness of breath  Cardiovascular: Negative for chest pain, palpitations and leg swelling  Gastrointestinal: Negative  Endocrine: Negative  Genitourinary: Negative  Musculoskeletal: Negative  Skin: Negative  Allergic/Immunologic: Negative  Neurological: Negative  Hematological: Negative  Psychiatric/Behavioral: Negative  All other systems reviewed and are negative          Past Medical History:   Diagnosis Date    3-vessel CAD     CAP (community acquired pneumonia)     Last assessed - 3/15/16    Cardiac murmur     Hyperlipidemia     Hypertension     Methicillin resistant Staphylococcus aureus infection     Mitral valve insufficiency     Nonspecific abnormal results of function study of thyroid     Last assessed - 10/15/14     Past Surgical History:   Procedure Laterality Date    CARDIAC SURGERY  1997    CORONARY ARTERY BYPASS GRAFT      x3; Last assessed - 9/29/17     History   Alcohol Use No     History   Drug Use No     History   Smoking Status    Former Smoker    Packs/day: 0 50    Years: 30 00    Types: Cigarettes    Quit date: 1978   Smokeless Tobacco    Never Used     Comment: Quit in 1985 per [employer]      Family History   Problem Relation Age of Onset    Adopted: Yes    Diabetes Family     No Known Problems Mother        Allergies:   Allergies   Allergen Reactions    Atorvastatin      Other reaction(s): Itching    Levofloxacin      Other reaction(s): GI Upset    Pravastatin      Other reaction(s): Itching       Medications:     Current Outpatient Prescriptions:     albuterol (VENTOLIN HFA) 90 mcg/act inhaler, Inhale 2 puffs every 6 (six) hours as needed for wheezing, Disp: 18 g, Rfl: 0    Ascorbic Acid (VITAMIN C) 500 MG CAPS, Take 1 capsule by mouth daily, Disp: , Rfl:     aspirin (ECOTRIN) 325 mg EC tablet, Take 1 tablet by mouth daily, Disp: , Rfl:     diazepam (VALIUM) 5 mg tablet, Take 1 tablet by mouth 3 (three) times a day as needed, Disp: , Rfl:     diltiazem (CARDIZEM CD) 240 mg 24 hr capsule, TAKE ONE CAPSULE BY MOUTH EVERY DAY, Disp: 90 capsule, Rfl: 3    fexofenadine (ALLEGRA) 180 MG tablet, Take 1 tablet by mouth daily as needed, Disp: , Rfl:     glucose blood (AIMEE CONTOUR TEST) test strip, by In Vitro route daily, Disp: , Rfl:     insulin glargine (BASAGLAR KWIKPEN) injection pen 100 units/mL, Inject under the skin, Disp: , Rfl:     Insulin Pen Needle (B-D UF III MINI PEN NEEDLES) 31G X 5 MM MISC, by Does not apply route, Disp: , Rfl:     levothyroxine 50 mcg tablet, TAKE 1 TABLET BY MOUTH DAILY, Disp: 30 tablet, Rfl: 4    losartan (COZAAR) 50 mg tablet, Take 1 tablet by mouth daily, Disp: , Rfl:     metFORMIN (GLUCOPHAGE-XR) 500 mg 24 hr tablet, Take 1 tablet by mouth 2 (two) times a day, Disp: , Rfl:     Omega-3 Fatty Acids (FISH OIL) 1,000 mg, Take 1 capsule by mouth daily, Disp: , Rfl:     sildenafil (REVATIO) 20 mg tablet, Once a day prn, Disp: 30 tablet, Rfl: 3    simvastatin (ZOCOR) 40 mg tablet, Take 1 tablet by mouth, Disp: , Rfl:     VITAMIN B COMPLEX-C PO, Take 1 tablet by mouth daily, Disp: , Rfl:       Wt Readings from Last 3 Encounters:   11/29/18 53 5 kg (117 lb 14 4 oz)   11/09/18 49 9 kg (110 lb)   04/13/18 53 3 kg (117 lb 6 4 oz)     Temp Readings from Last 3 Encounters:   11/09/18 (!) 96 5 °F (35 8 °C) (Tympanic)   04/13/18 (!) 78 °F (25 6 °C)   03/05/18 97 6 °F (36 4 °C) (Tympanic)     BP Readings from Last 3 Encounters:   11/29/18 124/58   11/09/18 140/60   10/18/18 122/62     Pulse Readings from Last 3 Encounters:   11/29/18 86   11/09/18 81   10/18/18 66         Physical Exam   Constitutional: He is oriented to person, place, and time  He appears well-developed  HENT:   Head: Atraumatic  Eyes: EOM are normal    Neck: Normal range of motion  Cardiovascular: Normal rate, regular rhythm and normal heart sounds  Exam reveals no gallop and no friction rub  No murmur heard  Pulmonary/Chest: Effort normal and breath sounds normal  No respiratory distress  He has no wheezes  He has no rales  Abdominal: Soft  Musculoskeletal: Normal range of motion  Neurological: He is alert and oriented to person, place, and time  Skin: Skin is warm and dry  Psychiatric: He has a normal mood and affect           Laboratory Studies:  CMP:  Lab Results   Component Value Date     (L) 01/09/2014    K 4 5 10/12/2018     10/12/2018    CO2 24 10/12/2018    ANIONGAP 7 01/09/2014    BUN 19 10/12/2018    CREATININE 0 94 10/12/2018    GLUCOSE 112 01/09/2014    AST 23 10/12/2018    ALT 29 10/12/2018    BILITOT 0 3 07/31/2014    EGFR 82 10/12/2018       Lipid Profile:   No results found for: CHOL  Lab Results   Component Value Date    HDL 60 10/12/2018     Lab Results   Component Value Date    LDLCALC 87 10/12/2018     Lab Results   Component Value Date    TRIG 68 10/12/2018       Cardiac testing:   EKG reviewed personally: NSR Zoë NSSTTWA  Results for orders placed during the hospital encounter of 09/28/17   Echo complete with contrast if indicated    Narrative [facility], [address] [facility]  [phone]    Transthoracic Echocardiogram  2D, M-mode, Doppler, and Color Doppler    Study date:  28-Sep-2017    Patient: Shorty Young  MR number: [id number]  Account number: [de-identified]  : 1950  Age: 74 years  Gender: Male  Status: Outpatient  Location: [facility]  Height: 64 in  Weight: 112 9 lb  BP: 120/ 70 mmHg    Indications: Cardiac murmur    Diagnoses: R01 1 - Cardiac murmur, unspecified    Sonographer:  ADELAIDA Pisano, RDCS  Referring Physician:  Jessie Cunningham DO  Group:  [facility]  Interpreting Physician:  Teodora Dubin, MD    SUMMARY    LEFT VENTRICLE:  Systolic function was normal  Ejection fraction was estimated to be 60 %  There were no regional wall motion abnormalities  RIGHT VENTRICLE:  The size was normal   Systolic function was mildly reduced  MITRAL VALVE:  There was mild regurgitation  TRICUSPID VALVE:  There was mild regurgitation  Pulmonary artery systolic pressure was within the normal range  PULMONIC VALVE:  There was trace regurgitation  HISTORY: PRIOR HISTORY: CAD; CABG; Hyperlipidemia; Hypertension; Hypothyroid; Diabetes Mellitus; Post polio syndrome    PROCEDURE: The study was performed in the [facility]  This was a routine study  The transthoracic approach was used  The study included complete 2D imaging, M-mode, complete spectral Doppler, and color Doppler  The  heart rate was 65 bpm, at the start of the study  Images were obtained from the parasternal, apical, subcostal, and suprasternal notch acoustic windows  Image quality was adequate  LEFT VENTRICLE: Size was normal  Systolic function was normal  Ejection fraction was estimated to be 60 %  There were no regional wall motion abnormalities   Wall thickness was normal  DOPPLER: Left ventricular diastolic function parameters  were normal     VENTRICULAR SEPTUM: There was normal motion  There was normal contour  RIGHT VENTRICLE: The size was normal  Systolic function was mildly reduced  Wall thickness was normal     LEFT ATRIUM: Size was normal     RIGHT ATRIUM: Size was normal     MITRAL VALVE: There was mild thickening of the valve  There was normal leaflet separation  DOPPLER: The transmitral velocity was within the normal range  There was no evidence for stenosis  There was mild regurgitation  AORTIC VALVE: The valve was trileaflet  Leaflets exhibited mildly increased thickness and lower normal cuspal separation  DOPPLER: Transaortic velocity was within the normal range  There was no evidence for stenosis  There was no  regurgitation  TRICUSPID VALVE: The valve structure was normal  There was normal leaflet separation  DOPPLER: The transtricuspid velocity was within the normal range  There was no evidence for stenosis  There was mild regurgitation  Pulmonary artery  systolic pressure was within the normal range  PULMONIC VALVE: Leaflets exhibited normal thickness, no calcification, and normal cuspal separation  DOPPLER: The transpulmonic velocity was within the normal range  There was trace regurgitation  PERICARDIUM: There was no pericardial effusion  AORTA: The root exhibited normal size      SYSTEM MEASUREMENT TABLES    2D  %FS: 33 82 %  AV Diam: 3 01 cm  EDV(Teich): 24 47 ml  EF(Cube): 71 01 %  EF(Teich): 64 83 %  ESV(Cube): 5 06 ml  ESV(Teich): 8 61 ml  IVSd: 1 38 cm  LA Area: 16 89 cm2  LA Diam: 4 28 cm  LVEDV MOD A4C: 62 16 ml  LVEF MOD A4C: 61 5 %  LVESV MOD A4C: 23 93 ml  LVIDd: 2 59 cm  LVIDs: 1 72 cm  LVLd A4C: 7 32 cm  LVLs A4C: 5 72 cm  LVPWd: 1 08 cm  RA Area: 9 22 cm2  RV Diam: 3 26 cm  SI(Cube): 8 05 ml/m2  SI(Teich): 10 3 ml/m2  SV MOD A4C: 38 23 ml  SV(Cube): 12 4 ml  SV(Teich): 15 87 ml    CW  TR MaxP 57 mmHg  TR Vmax: 2 14 m/s    MM  TAPSE: 1 4 cm    PW  E': 0 05 m/s  E/E': 25 04  MV A Camilo: 1 34 m/s  MV Dec [place]: 5 38 m/s2  MV DecT: 251 7 ms  MV E Camilo: 1 35 m/s  MV E/A Ratio: 1 01    [employer] Accredited Echocardiography Laboratory    Prepared and electronically signed by    Uzair Lopez MD  Signed [id number] 17:41:27

## 2018-12-04 ENCOUNTER — TELEPHONE (OUTPATIENT)
Dept: CARDIOLOGY CLINIC | Facility: CLINIC | Age: 68
End: 2018-12-04

## 2018-12-04 NOTE — TELEPHONE ENCOUNTER
Stormy Kong,     Pt stopped by office because his diazepam is not covered by his insurance  When you get a chance if you can do a prior auth for pt he left phone number [phone] for the prior auth

## 2018-12-05 ENCOUNTER — TELEPHONE (OUTPATIENT)
Dept: CARDIOLOGY CLINIC | Facility: CLINIC | Age: 68
End: 2018-12-05

## 2018-12-07 DIAGNOSIS — E11.9 TYPE 2 DIABETES MELLITUS WITHOUT COMPLICATION, WITHOUT LONG-TERM CURRENT USE OF INSULIN (HCC): Primary | ICD-10-CM

## 2018-12-07 DIAGNOSIS — I10 HTN (HYPERTENSION): Primary | ICD-10-CM

## 2018-12-08 RX ORDER — METFORMIN HYDROCHLORIDE 500 MG/1
TABLET, EXTENDED RELEASE ORAL
Qty: 180 TABLET | Refills: 3 | Status: SHIPPED | OUTPATIENT
Start: 2018-12-08 | End: 2019-11-28 | Stop reason: SDUPTHER

## 2018-12-08 RX ORDER — LOSARTAN POTASSIUM 50 MG/1
TABLET ORAL
Qty: 90 TABLET | Refills: 3 | Status: SHIPPED | OUTPATIENT
Start: 2018-12-08 | End: 2019-11-24 | Stop reason: SDUPTHER

## 2018-12-11 ENCOUNTER — TELEPHONE (OUTPATIENT)
Dept: CARDIOLOGY CLINIC | Facility: CLINIC | Age: 68
End: 2018-12-11

## 2018-12-11 NOTE — TELEPHONE ENCOUNTER
When you get a chance if you can contact pt directly about the Diazepam  In epic on 12/5 there is a message from you saying that the medication was approved till 12/2019 but pt is calling again bc he s/w insurance per insurance the medication is not approved

## 2018-12-12 ENCOUNTER — TELEPHONE (OUTPATIENT)
Dept: CARDIOLOGY CLINIC | Facility: CLINIC | Age: 68
End: 2018-12-12

## 2018-12-12 DIAGNOSIS — I25.10 CAD IN NATIVE ARTERY: ICD-10-CM

## 2018-12-12 RX ORDER — DIAZEPAM 5 MG/1
5 TABLET ORAL EVERY 8 HOURS PRN
Qty: 90 TABLET | Refills: 1 | Status: SHIPPED | OUTPATIENT
Start: 2018-12-12 | End: 2021-05-26 | Stop reason: SDUPTHER

## 2019-01-06 DIAGNOSIS — E03.9 HYPOTHYROIDISM, UNSPECIFIED TYPE: ICD-10-CM

## 2019-01-06 RX ORDER — LEVOTHYROXINE SODIUM 0.05 MG/1
TABLET ORAL
Qty: 30 TABLET | Refills: 3 | Status: SHIPPED | OUTPATIENT
Start: 2019-01-06 | End: 2019-04-27 | Stop reason: SDUPTHER

## 2019-02-19 ENCOUNTER — APPOINTMENT (OUTPATIENT)
Dept: LAB | Facility: CLINIC | Age: 69
End: 2019-02-19
Payer: MEDICARE

## 2019-02-19 DIAGNOSIS — Z12.5 SCREENING PSA (PROSTATE SPECIFIC ANTIGEN): ICD-10-CM

## 2019-02-19 DIAGNOSIS — E13.9 DIABETES 1.5, MANAGED AS TYPE 2 (HCC): ICD-10-CM

## 2019-02-19 LAB
ALBUMIN SERPL BCP-MCNC: 3.9 G/DL (ref 3.5–5)
ALP SERPL-CCNC: 69 U/L (ref 46–116)
ALT SERPL W P-5'-P-CCNC: 32 U/L (ref 12–78)
ANION GAP SERPL CALCULATED.3IONS-SCNC: 10 MMOL/L (ref 4–13)
AST SERPL W P-5'-P-CCNC: 25 U/L (ref 5–45)
BILIRUB SERPL-MCNC: 0.3 MG/DL (ref 0.2–1)
BUN SERPL-MCNC: 23 MG/DL (ref 5–25)
CALCIUM SERPL-MCNC: 9.5 MG/DL (ref 8.3–10.1)
CHLORIDE SERPL-SCNC: 102 MMOL/L (ref 100–108)
CHOLEST SERPL-MCNC: 200 MG/DL (ref 50–200)
CO2 SERPL-SCNC: 25 MMOL/L (ref 21–32)
CREAT SERPL-MCNC: 0.81 MG/DL (ref 0.6–1.3)
EST. AVERAGE GLUCOSE BLD GHB EST-MCNC: 140 MG/DL
GFR SERPL CREATININE-BSD FRML MDRD: 90 ML/MIN/1.73SQ M
GLUCOSE P FAST SERPL-MCNC: 101 MG/DL (ref 65–99)
HBA1C MFR BLD: 6.5 % (ref 4.2–6.3)
HDLC SERPL-MCNC: 68 MG/DL (ref 40–60)
LDLC SERPL CALC-MCNC: 114 MG/DL (ref 0–100)
POTASSIUM SERPL-SCNC: 4.2 MMOL/L (ref 3.5–5.3)
PROT SERPL-MCNC: 7.7 G/DL (ref 6.4–8.2)
PSA SERPL-MCNC: 4 NG/ML (ref 0–4)
SODIUM SERPL-SCNC: 137 MMOL/L (ref 136–145)
TRIGL SERPL-MCNC: 91 MG/DL

## 2019-02-19 PROCEDURE — 83036 HEMOGLOBIN GLYCOSYLATED A1C: CPT

## 2019-02-19 PROCEDURE — G0103 PSA SCREENING: HCPCS

## 2019-02-19 PROCEDURE — 80053 COMPREHEN METABOLIC PANEL: CPT

## 2019-02-19 PROCEDURE — 36415 COLL VENOUS BLD VENIPUNCTURE: CPT

## 2019-02-19 PROCEDURE — 80061 LIPID PANEL: CPT

## 2019-02-20 ENCOUNTER — OFFICE VISIT (OUTPATIENT)
Dept: INTERNAL MEDICINE CLINIC | Facility: CLINIC | Age: 69
End: 2019-02-20
Payer: MEDICARE

## 2019-02-20 VITALS
DIASTOLIC BLOOD PRESSURE: 64 MMHG | SYSTOLIC BLOOD PRESSURE: 138 MMHG | BODY MASS INDEX: 18.84 KG/M2 | OXYGEN SATURATION: 94 % | HEIGHT: 66 IN | WEIGHT: 117.2 LBS | RESPIRATION RATE: 16 BRPM | HEART RATE: 76 BPM

## 2019-02-20 DIAGNOSIS — N52.9 ERECTILE DYSFUNCTION, UNSPECIFIED ERECTILE DYSFUNCTION TYPE: ICD-10-CM

## 2019-02-20 DIAGNOSIS — K92.1 BLOOD IN THE STOOL: ICD-10-CM

## 2019-02-20 DIAGNOSIS — E78.5 DYSLIPIDEMIA: ICD-10-CM

## 2019-02-20 DIAGNOSIS — R05.9 COUGH: ICD-10-CM

## 2019-02-20 DIAGNOSIS — R97.20 ELEVATED PSA: ICD-10-CM

## 2019-02-20 DIAGNOSIS — E11.8 TYPE 2 DIABETES MELLITUS WITH COMPLICATION, UNSPECIFIED WHETHER LONG TERM INSULIN USE: Primary | ICD-10-CM

## 2019-02-20 PROCEDURE — 99214 OFFICE O/P EST MOD 30 MIN: CPT | Performed by: INTERNAL MEDICINE

## 2019-02-20 NOTE — ASSESSMENT & PLAN NOTE
Lab Results   Component Value Date    HGBA1C 6 5 (H) 02/19/2019       No results for input(s): POCGLU in the last 72 hours  Blood Sugar Average: Last 72 hrs:   controlled, I have counselled the pt to follow a healthy and balanced diet ,and recommend routine exercise  I will be ordering diabetic laboratories including comprehensive metabolic panel, hemoglobin A1c, urine microalbumin, lipid panel

## 2019-02-20 NOTE — PROGRESS NOTES
Assessment/Plan:    Blood in the stool  Examination consistent with external hemorrhoids will have the patient see GI for full colonoscopy Dr Kamille Parham; currently patient is asymptomatic and doing well did recommend preparation H  If any major bleeding go up to the ER for evaluation    Type 2 diabetes mellitus with complication ([place] [id number] )  Lab Results   Component Value Date    HGBA1C 6 5 (H) 02/19/2019       No results for input(s): POCGLU in the last 72 hours  Blood Sugar Average: Last 72 hrs:   controlled, I have counselled the pt to follow a healthy and balanced diet ,and recommend routine exercise  I will be ordering diabetic laboratories including comprehensive metabolic panel, hemoglobin A1c, urine microalbumin, lipid panel  Cough  Resolved    Dyslipidemia  Hyperlipidemia controlled continue with current medical regiment recommend a low-cholesterol diet and recommend routine exercise we will continue to monitor the progress  Elevated PSA  I will have the patient see urology         Problem List Items Addressed This Visit        Digestive    Blood in the stool     Examination consistent with external hemorrhoids will have the patient see GI for full colonoscopy Dr Kamille Parham; currently patient is asymptomatic and doing well did recommend preparation H  If any major bleeding go up to the ER for evaluation         Relevant Orders    Ambulatory referral to Gastroenterology       Endocrine    Type 2 diabetes mellitus with complication ([place] [id number] ) - Primary     Lab Results   Component Value Date    HGBA1C 6 5 (H) 02/19/2019       No results for input(s): POCGLU in the last 72 hours  Blood Sugar Average: Last 72 hrs:   controlled, I have counselled the pt to follow a healthy and balanced diet ,and recommend routine exercise  I will be ordering diabetic laboratories including comprehensive metabolic panel, hemoglobin A1c, urine microalbumin, lipid panel           Relevant Orders    Comprehensive metabolic panel    Hemoglobin A1C    Lipid Panel with Direct LDL reflex       Genitourinary    Erectile dysfunction    Relevant Orders    Ambulatory referral to Urology       Other    Cough     Resolved         Dyslipidemia     Hyperlipidemia controlled continue with current medical regiment recommend a low-cholesterol diet and recommend routine exercise we will continue to monitor the progress  Elevated PSA     I will have the patient see urology               Return to office 4-6  months  call if any problems  Subjective:      Patient ID: Harjeet Veliz is a 77 y o  male  HPI 74-year old male coming in for a follow up visit regarding blood in the stool, erectile dysfunction/elevation of the PSA, resolved cough, type 2 diabetes; The patient reports me compliant taking medications without untoward side effects the  The patient is here to review his medical condition, update me on the medical condition and the patient reports me no hospitalizations and no ER visits  He reports me  2 week ago increase rectal bleeding , the pt reports no bleeding in the last 2 days , too much pepper and chili pepper , carrott  The following portions of the patient's history were reviewed and updated as appropriate: allergies, current medications, past family history, past medical history, past social history, past surgical history and problem list     Review of Systems   Constitutional: Negative for activity change, appetite change and unexpected weight change  HENT: Negative for congestion and postnasal drip  Eyes: Negative for visual disturbance  Respiratory: Negative for cough and shortness of breath  Cardiovascular: Negative for chest pain  Gastrointestinal: Positive for blood in stool  Negative for abdominal pain, diarrhea, nausea and vomiting  Hematological: Negative for adenopathy  Objective:    No follow-ups on file  No results found    Recent Results (from the past 15743 hour(s))   POCT ECG    Impression    NSR 78 NSSTTWA   Echo complete with contrast if indicated    Narrative    [facility], [address] [facility]  [phone]    Transthoracic Echocardiogram  2D, M-mode, Doppler, and Color Doppler    Study date:  28-Sep-2017    Patient: Ari Mejía  MR number: [id number]  Account number: [de-identified]  : 1950  Age: 74 years  Gender: Male  Status: Outpatient  Location: [address] [place] [address]  Height: 64 in  Weight: 112 9 lb  BP: 120/ 70 mmHg    Indications: Cardiac murmur    Diagnoses: R01 1 - Cardiac murmur, unspecified    Sonographer:  ADELAIDA Vogel, RDCS  Referring Physician:  Annita Gonzáles DO  Group:  [facility]  Interpreting Physician:  Richard Fernandez MD    SUMMARY    LEFT VENTRICLE:  Systolic function was normal  Ejection fraction was estimated to be 60 %  There were no regional wall motion abnormalities  RIGHT VENTRICLE:  The size was normal   Systolic function was mildly reduced  MITRAL VALVE:  There was mild regurgitation  TRICUSPID VALVE:  There was mild regurgitation  Pulmonary artery systolic pressure was within the normal range  PULMONIC VALVE:  There was trace regurgitation  HISTORY: PRIOR HISTORY: CAD; CABG; Hyperlipidemia; Hypertension; Hypothyroid; Diabetes Mellitus; Post polio syndrome    PROCEDURE: The study was performed in the [address] [place] [address]  This was a routine study  The transthoracic approach was used  The study included complete 2D imaging, M-mode, complete spectral Doppler, and color Doppler  The  heart rate was 65 bpm, at the start of the study  Images were obtained from the parasternal, apical, subcostal, and suprasternal notch acoustic windows  Image quality was adequate  LEFT VENTRICLE: Size was normal  Systolic function was normal  Ejection fraction was estimated to be 60 %  There were no regional wall motion abnormalities   Wall thickness was normal  DOPPLER: Left ventricular diastolic function parameters  were normal     VENTRICULAR SEPTUM: There was normal motion  There was normal contour  RIGHT VENTRICLE: The size was normal  Systolic function was mildly reduced  Wall thickness was normal     LEFT ATRIUM: Size was normal     RIGHT ATRIUM: Size was normal     MITRAL VALVE: There was mild thickening of the valve  There was normal leaflet separation  DOPPLER: The transmitral velocity was within the normal range  There was no evidence for stenosis  There was mild regurgitation  AORTIC VALVE: The valve was trileaflet  Leaflets exhibited mildly increased thickness and lower normal cuspal separation  DOPPLER: Transaortic velocity was within the normal range  There was no evidence for stenosis  There was no  regurgitation  TRICUSPID VALVE: The valve structure was normal  There was normal leaflet separation  DOPPLER: The transtricuspid velocity was within the normal range  There was no evidence for stenosis  There was mild regurgitation  Pulmonary artery  systolic pressure was within the normal range  PULMONIC VALVE: Leaflets exhibited normal thickness, no calcification, and normal cuspal separation  DOPPLER: The transpulmonic velocity was within the normal range  There was trace regurgitation  PERICARDIUM: There was no pericardial effusion  AORTA: The root exhibited normal size      SYSTEM MEASUREMENT TABLES    2D  %FS: 33 82 %  AV Diam: 3 01 cm  EDV(Teich): 24 47 ml  EF(Cube): 71 01 %  EF(Teich): 64 83 %  ESV(Cube): 5 06 ml  ESV(Teich): 8 61 ml  IVSd: 1 38 cm  LA Area: 16 89 cm2  LA Diam: 4 28 cm  LVEDV MOD A4C: 62 16 ml  LVEF MOD A4C: 61 5 %  LVESV MOD A4C: 23 93 ml  LVIDd: 2 59 cm  LVIDs: 1 72 cm  LVLd A4C: 7 32 cm  LVLs A4C: 5 72 cm  LVPWd: 1 08 cm  RA Area: 9 22 cm2  RV Diam: 3 26 cm  SI(Cube): 8 05 ml/m2  SI(Teich): 10 3 ml/m2  SV MOD A4C: 38 23 ml  SV(Cube): 12 4 ml  SV(Teich): 15 87 ml    CW  TR MaxP 57 mmHg  TR Vmax: 2 14 m/s    MM  TAPSE: 1 4 cm    PW  E': 0 05 m/s  E/E': 25 04  MV A Camilo: 1 34 m/s  MV Dec Troup: 5 38 m/s2  MV DecT: 251 7 ms  MV E Camilo: 1 35 m/s  MV E/A Ratio: 1 01    [employer] Accredited Echocardiography [facility]    Prepared and electronically signed by    Jerad Ingram MD  Signed 28-Sep-2017 17:41:27         Allergies   Allergen Reactions    Atorvastatin      Other reaction(s): Itching    Levofloxacin      Other reaction(s): GI Upset    Pravastatin      Other reaction(s): Itching       Past Medical History:   Diagnosis Date    3-vessel CAD     CAP (community acquired pneumonia)     Last assessed - 3/15/16    Cardiac murmur     Hyperlipidemia     Hypertension     Methicillin resistant Staphylococcus aureus infection     Mitral valve insufficiency     Nonspecific abnormal results of function study of thyroid     Last assessed - 10/15/14     Past Surgical History:   Procedure Laterality Date   [facility]    CORONARY ARTERY BYPASS GRAFT      x3; Last assessed - 9/29/17     Current Outpatient Medications on File Prior to Visit   Medication Sig Dispense Refill    albuterol (VENTOLIN HFA) 90 mcg/act inhaler Inhale 2 puffs every 6 (six) hours as needed for wheezing 18 g 0    Ascorbic Acid (VITAMIN C) 500 MG CAPS Take 1 capsule by mouth daily      aspirin (ECOTRIN) 325 mg EC tablet Take 1 tablet by mouth daily      diazepam (VALIUM) 5 mg tablet Take 1 tablet (5 mg total) by mouth every 8 (eight) hours as needed for anxiety (anxiety) 90 tablet 1    diltiazem (CARDIZEM CD) 240 mg 24 hr capsule TAKE ONE CAPSULE BY MOUTH EVERY DAY 90 capsule 3    fexofenadine (ALLEGRA) 180 MG tablet Take 1 tablet by mouth daily as needed      glucose blood (AIMEE CONTOUR TEST) test strip by In Vitro route daily      insulin glargine (BASAGLAR KWIKPEN) injection pen 100 units/mL Inject under the skin      Insulin Pen Needle (B-D UF III MINI PEN NEEDLES) 31G X 5 MM MISC by Does not apply route      levothyroxine 50 mcg tablet TAKE 1 TABLET BY MOUTH DAILY 30 tablet 3    losartan (COZAAR) 50 mg tablet TAKE 1 TABLET BY MOUTH EVERY DAY 90 tablet 3    metFORMIN (GLUCOPHAGE-XR) 500 mg 24 hr tablet TAKE 1 TABLET BY MOUTH TWICE A  tablet 3    Omega-3 Fatty Acids (FISH OIL) 1,000 mg Take 1 capsule by mouth daily      sildenafil (REVATIO) 20 mg tablet Once a day prn 30 tablet 3    simvastatin (ZOCOR) 40 mg tablet Take 1 tablet by mouth      VITAMIN B COMPLEX-C PO Take 1 tablet by mouth daily       No current facility-administered medications on file prior to visit        Family History   Adopted: Yes   Problem Relation Age of Onset    Diabetes Family     No Known Problems Mother      Social History     Socioeconomic History    Marital status: /Civil Union     Spouse name: Not on file    Number of children: Not on file    Years of education: Not on file    Highest education level: Not on file   Occupational History    Occupation: Retired   Social Needs    Financial resource strain: Not on file    Food insecurity:     Worry: Not on file     Inability: Not on file   [employer] needs:     Medical: Not on file     Non-medical: Not on file   Tobacco Use    Smoking status: Former Smoker     Packs/day: 0 50     Years: 30 00     Pack years: 15 00     Types: Cigarettes     Last attempt to quit:      Years since quittin 1    Smokeless tobacco: Never Used    [facility]    Substance and Sexual Activity    Alcohol use: No    Drug use: No    Sexual activity: Not on file   Lifestyle    Physical activity:     Days per week: Not on file     Minutes per session: Not on file    Stress: Not on file   Relationships    Social connections:     Talks on phone: Not on file     Gets together: Not on file     Attends Gnosticist service: Not on file     Active member of club or organization: Not on file     Attends meetings of clubs or organizations: Not on file     Relationship status: Not on file  Intimate partner violence:     Fear of current or ex partner: Not on file     Emotionally abused: Not on file     Physically abused: Not on file     Forced sexual activity: Not on file   Other Topics Concern    Not on file   Social History Narrative    Exercising regularly    Living with significant other     Vitals:    02/20/19 1154   BP: 138/64   Pulse: 76   Resp: 16   SpO2: 94%   Weight: 53 2 kg (117 lb 3 2 oz)   Height: 5' 6" (1 676 m)     Results for orders placed or performed in visit on 02/19/19   Comprehensive metabolic panel   Result Value Ref Range    Sodium 137 136 - 145 mmol/L    Potassium 4 2 3 5 - 5 3 mmol/L    Chloride 102 100 - 108 mmol/L    CO2 25 21 - 32 mmol/L    ANION GAP 10 4 - 13 mmol/L    BUN 23 5 - 25 mg/dL    Creatinine 0 81 0 60 - 1 30 mg/dL    Glucose, Fasting 101 (H) 65 - 99 mg/dL    Calcium 9 5 8 3 - 10 1 mg/dL    AST 25 5 - 45 U/L    ALT 32 12 - 78 U/L    Alkaline Phosphatase 69 46 - 116 U/L    Total Protein 7 7 6 4 - 8 2 g/dL    Albumin 3 9 3 5 - 5 0 g/dL    Total Bilirubin 0 30 0 20 - 1 00 mg/dL    eGFR 90 ml/min/1 73sq m   Hemoglobin A1C   Result Value Ref Range    Hemoglobin A1C 6 5 (H) 4 2 - 6 3 %     mg/dl   Lipid Panel with Direct LDL reflex   Result Value Ref Range    Cholesterol 200 50 - 200 mg/dL    Triglycerides 91 <=150 mg/dL    HDL, Direct 68 (H) 40 - 60 mg/dL    LDL Calculated 114 (H) 0 - 100 mg/dL   PSA, Total Screen   Result Value Ref Range    PSA 4 0 0 0 - 4 0 ng/mL     Weight (last 2 days)     Date/Time   Weight    02/20/19 1154   53 2 (117 2)            Body mass index is 18 92 kg/m²    BP      Temp      Pulse     Resp      SpO2        Vitals:    02/20/19 1154   Weight: 53 2 kg (117 lb 3 2 oz)     Vitals:    02/20/19 1154   Weight: 53 2 kg (117 lb 3 2 oz)       /64   Pulse 76   Resp 16   Ht 5' 6" (1 676 m)   Wt 53 2 kg (117 lb 3 2 oz)   SpO2 94%   BMI 18 92 kg/m²       Examination of the rectum does show 2 external hemorrhoids with excoriations but no bleeding examination -the medical assistant Bee Leary in the room during the exam    Physical Exam   Constitutional: He appears well-developed and well-nourished  No distress  HENT:   Head: Normocephalic and atraumatic  Right Ear: External ear normal    Left Ear: External ear normal    Mouth/Throat: Oropharynx is clear and moist    Eyes: Pupils are equal, round, and reactive to light  Conjunctivae are normal  Right eye exhibits no discharge  Left eye exhibits no discharge  No scleral icterus  Neck: Neck supple  Cardiovascular: Normal rate, regular rhythm and normal heart sounds  Exam reveals no gallop and no friction rub  No murmur heard  Pulmonary/Chest: No respiratory distress  He has no wheezes  He has no rales  Abdominal: Soft  Bowel sounds are normal  He exhibits no distension and no mass  There is no tenderness  There is no rebound and no guarding  Musculoskeletal: He exhibits no edema or deformity  Lymphadenopathy:     He has no cervical adenopathy  Neurological: He is alert  Skin: He is not diaphoretic  Psychiatric: He has a normal mood and affect

## 2019-02-20 NOTE — ASSESSMENT & PLAN NOTE
Examination consistent with external hemorrhoids will have the patient see GI for full colonoscopy Dr Naye Wynne; currently patient is asymptomatic and doing well did recommend preparation H   If any major bleeding go up to the ER for evaluation

## 2019-02-22 ENCOUNTER — TELEPHONE (OUTPATIENT)
Dept: GASTROENTEROLOGY | Facility: CLINIC | Age: 69
End: 2019-02-22

## 2019-03-06 ENCOUNTER — OFFICE VISIT (OUTPATIENT)
Dept: GASTROENTEROLOGY | Facility: AMBULARY SURGERY CENTER | Age: 69
End: 2019-03-06
Payer: MEDICARE

## 2019-03-06 VITALS
BODY MASS INDEX: 17.74 KG/M2 | HEART RATE: 80 BPM | SYSTOLIC BLOOD PRESSURE: 140 MMHG | DIASTOLIC BLOOD PRESSURE: 60 MMHG | HEIGHT: 66 IN | RESPIRATION RATE: 14 BRPM | TEMPERATURE: 98 F | WEIGHT: 110.4 LBS

## 2019-03-06 DIAGNOSIS — K62.5 RECTAL BLEEDING: Primary | ICD-10-CM

## 2019-03-06 PROCEDURE — 99214 OFFICE O/P EST MOD 30 MIN: CPT | Performed by: INTERNAL MEDICINE

## 2019-03-06 PROCEDURE — 1123F ACP DISCUSS/DSCN MKR DOCD: CPT | Performed by: INTERNAL MEDICINE

## 2019-03-06 NOTE — PROGRESS NOTES
Consultation - [address] [facility]  Amy Ang 1950 male         Chief Complaint:  For colonoscopy    HPI: 20-year-old male with history of diabetes mellitus, hypertension, hyperlipidemia was referred for colonoscopy  Patient never had colonoscopy in the past   Regular bowel movements and reports having some fresh bleeding from the rectum at times  Good appetite, no recent weight loss  No abdominal pain, nausea or vomiting  Denies any heartburn acid reflux  Denies any difficulty swallowing  REVIEW OF SYSTEMS: Review of Systems   Constitutional: Negative for activity change, appetite change, chills, diaphoresis, fatigue, fever and unexpected weight change  HENT: Negative for ear discharge, ear pain, facial swelling, hearing loss, nosebleeds, sore throat, tinnitus and voice change  Eyes: Negative for pain, discharge, redness, itching and visual disturbance  Respiratory: Negative for apnea, cough, chest tightness, shortness of breath and wheezing  Cardiovascular: Negative for chest pain and palpitations  Gastrointestinal:        As noted in HPI   Endocrine: Negative for cold intolerance, heat intolerance and polyuria  Genitourinary: Negative for difficulty urinating, dysuria, flank pain, hematuria and urgency  Musculoskeletal: Negative for arthralgias, back pain, gait problem, joint swelling and myalgias  Skin: Negative for rash and wound  Neurological: Negative for dizziness, tremors, seizures, speech difficulty, light-headedness, numbness and headaches  Hematological: Negative for adenopathy  Does not bruise/bleed easily  Psychiatric/Behavioral: Negative for agitation, behavioral problems and confusion  The patient is not nervous/anxious           Past Medical History:   Diagnosis Date    3-vessel CAD     CAP (community acquired pneumonia)     Last assessed - 3/15/16    Cardiac murmur     Hyperlipidemia     Hypertension     Methicillin resistant Staphylococcus aureus infection     Mitral valve insufficiency     Nonspecific abnormal results of function study of thyroid     Last assessed - 10/15/14      Past Surgical History:   Procedure Laterality Date    CARDIAC SURGERY      CORONARY ARTERY BYPASS GRAFT      x3; Last assessed - 17     Social History     Socioeconomic History    Marital status: /Civil Union     Spouse name: Not on file    Number of children: Not on file    Years of education: Not on file    Highest education level: Not on file   Occupational History    Occupation: Retired   Social Needs    Financial resource strain: Not on file    Food insecurity:     Worry: Not on file     Inability: Not on file   [place] needs:     Medical: Not on file     Non-medical: Not on file   Tobacco Use    Smoking status: Former Smoker     Packs/day: 0 50     Years: 30 00     Pack years: 15 00     Types: Cigarettes     Last attempt to quit:      Years since quittin 2    Smokeless tobacco: Never Used    Tobacco comment: Quit in  per [employer]    Substance and Sexual Activity    Alcohol use: No    Drug use: No    Sexual activity: Not on file   Lifestyle    Physical activity:     Days per week: Not on file     Minutes per session: Not on file    Stress: Not on file   Relationships    Social connections:     Talks on phone: Not on file     Gets together: Not on file     Attends Church service: Not on file     Active member of club or organization: Not on file     Attends meetings of clubs or organizations: Not on file     Relationship status: Not on file    Intimate partner violence:     Fear of current or ex partner: Not on file     Emotionally abused: Not on file     Physically abused: Not on file     Forced sexual activity: Not on file   Other Topics Concern    Not on file   Social History Narrative    Exercising regularly    Living with significant other     Family History   Adopted: Yes   Problem Relation Age of Onset    Diabetes Family     No Known Problems Mother      Atorvastatin; Levofloxacin; and Pravastatin  Current Outpatient Medications   Medication Sig Dispense Refill    Ascorbic Acid (VITAMIN C) 500 MG CAPS Take 1 capsule by mouth daily      aspirin (ECOTRIN) 325 mg EC tablet Take 1 tablet by mouth daily      diazepam (VALIUM) 5 mg tablet Take 1 tablet (5 mg total) by mouth every 8 (eight) hours as needed for anxiety (anxiety) 90 tablet 1    diltiazem (CARDIZEM CD) 240 mg 24 hr capsule TAKE ONE CAPSULE BY MOUTH EVERY DAY 90 capsule 3    glucose blood (AIMEE CONTOUR TEST) test strip by In Vitro route daily      insulin glargine (BASAGLAR KWIKPEN) injection pen 100 units/mL Inject under the skin      Insulin Pen Needle (B-D UF III MINI PEN NEEDLES) 31G X 5 MM MISC by Does not apply route      levothyroxine 50 mcg tablet TAKE 1 TABLET BY MOUTH DAILY 30 tablet 3    losartan (COZAAR) 50 mg tablet TAKE 1 TABLET BY MOUTH EVERY DAY 90 tablet 3    metFORMIN (GLUCOPHAGE-XR) 500 mg 24 hr tablet TAKE 1 TABLET BY MOUTH TWICE A  tablet 3    Omega-3 Fatty Acids (FISH OIL) 1,000 mg Take 1 capsule by mouth daily      sildenafil (REVATIO) 20 mg tablet Once a day prn 30 tablet 3    simvastatin (ZOCOR) 40 mg tablet Take 1 tablet by mouth      VITAMIN B COMPLEX-C PO Take 1 tablet by mouth daily      albuterol (VENTOLIN HFA) 90 mcg/act inhaler Inhale 2 puffs every 6 (six) hours as needed for wheezing (Patient not taking: Reported on 3/6/2019) 18 g 0    fexofenadine (ALLEGRA) 180 MG tablet Take 1 tablet by mouth daily as needed      Na Sulfate-K Sulfate-Mg Sulf (SUPREP BOWEL PREP KIT) 17 5-3 13-1 6 GM/177ML SOLN Take 2 Bottles by mouth see administration instructions Please follow the instructions from the office 2 Bottle 0     No current facility-administered medications for this visit  Blood pressure 140/60, pulse 80, temperature 98 °F (36 7 °C), temperature source Tympanic, resp   rate 14, height 5' 6" (1 676 m), weight 50 1 kg (110 lb 6 4 oz)  PHYSICAL EXAM: Physical Exam   Constitutional: He is oriented to person, place, and time  He appears well-developed  HENT:   Head: Normocephalic and atraumatic  Mouth/Throat: Oropharynx is clear and moist    Eyes: Pupils are equal, round, and reactive to light  Conjunctivae are normal  Right eye exhibits no discharge  Left eye exhibits no discharge  No scleral icterus  Neck: Neck supple  No JVD present  No tracheal deviation present  No thyromegaly present  Cardiovascular: Normal rate, regular rhythm, normal heart sounds and intact distal pulses  Exam reveals no gallop and no friction rub  No murmur heard  Pulmonary/Chest: Effort normal and breath sounds normal  No respiratory distress  He has no wheezes  He has no rales  He exhibits no tenderness  Abdominal: Soft  Bowel sounds are normal  He exhibits no distension and no mass  There is no tenderness  There is no rebound and no guarding  No hernia  Musculoskeletal: He exhibits no edema  Lymphadenopathy:     He has no cervical adenopathy  Neurological: He is alert and oriented to person, place, and time  Skin: Skin is warm and dry  No rash noted  No erythema  Psychiatric: He has a normal mood and affect   His behavior is normal  Thought content normal         Lab Results   Component Value Date    WBC 7 13 08/22/2016    HGB 14 6 08/22/2016    HCT 42 6 08/22/2016    MCV 88 08/22/2016     08/22/2016     Lab Results   Component Value Date    GLUCOSE 112 01/09/2014    CALCIUM 9 5 02/19/2019     (L) 01/09/2014    K 4 2 02/19/2019    CO2 25 02/19/2019     02/19/2019    BUN 23 02/19/2019    CREATININE 0 81 02/19/2019     Lab Results   Component Value Date    ALT 32 02/19/2019    AST 25 02/19/2019    ALKPHOS 69 02/19/2019    BILITOT 0 3 07/31/2014     No results found for: INR, PROTIME    Ct Chest High Resolution    Result Date: 12/22/2017  Impression: No significant change in bronchiectasis and groundglass density in the left lower lobe, presumably reflecting sequela of chronic infectious process  Stable mild groundglass densities in the left upper lobe  Workstation performed: [id number]       ASSESSMENT & PLAN:    Rectal bleeding  Rectal bleeding appear to most likely from hemorrhoids  Rule out colorectal lesions including polyps or malignancy  Patient also never had colonoscopy in the past     -Schedule for colonoscopy  -High-fiber diet     -Patient was given instructions about the colonoscopy prep     -Patient was explained about  the risks and benefits of the procedure  Risks including but not limited to bleeding, infection, perforation were explained in detail  Also explained about less than 100% sensitivity with the exam and other alternatives

## 2019-03-06 NOTE — LETTER
March 7, 2019     [address], [address] [address]    Patient: Jeff Barber   YOB: 1950   Date of Visit: 3/6/2019       Dear Dr Lynette Sanchez: Thank you for referring Urszula Hinds to me for evaluation  Below are my notes for this consultation  If you have questions, please do not hesitate to call me  I look forward to following your patient along with you  Sincerely,        Jalen Reid MD        CC: No Recipients  Jalen Reid MD  3/6/2019  4:06 PM  Signed  Consultation - [address] [facility]  Jeff Barber 1950 male         Chief Complaint:  For colonoscopy    HPI: 33-year-old male with history of diabetes mellitus, hypertension, hyperlipidemia was referred for colonoscopy  Patient never had colonoscopy in the past   Regular bowel movements and reports having some fresh bleeding from the rectum at times  Good appetite, no recent weight loss  No abdominal pain, nausea or vomiting  Denies any heartburn acid reflux  Denies any difficulty swallowing  REVIEW OF SYSTEMS: Review of Systems   Constitutional: Negative for activity change, appetite change, chills, diaphoresis, fatigue, fever and unexpected weight change  HENT: Negative for ear discharge, ear pain, facial swelling, hearing loss, nosebleeds, sore throat, tinnitus and voice change  Eyes: Negative for pain, discharge, redness, itching and visual disturbance  Respiratory: Negative for apnea, cough, chest tightness, shortness of breath and wheezing  Cardiovascular: Negative for chest pain and palpitations  Gastrointestinal:        As noted in HPI   Endocrine: Negative for cold intolerance, heat intolerance and polyuria  Genitourinary: Negative for difficulty urinating, dysuria, flank pain, hematuria and urgency  Musculoskeletal: Negative for arthralgias, back pain, gait problem, joint swelling and myalgias  Skin: Negative for rash and wound     Neurological: Negative for dizziness, tremors, seizures, speech difficulty, light-headedness, numbness and headaches  Hematological: Negative for adenopathy  Does not bruise/bleed easily  Psychiatric/Behavioral: Negative for agitation, behavioral problems and confusion  The patient is not nervous/anxious           Past Medical History:   Diagnosis Date    3-vessel CAD     CAP (community acquired pneumonia)     Last assessed - 3/15/16    Cardiac murmur     Hyperlipidemia     Hypertension     Methicillin resistant Staphylococcus aureus infection     Mitral valve insufficiency     Nonspecific abnormal results of function study of thyroid     Last assessed - 10/15/14      Past Surgical History:   Procedure Laterality Date    CARDIAC SURGERY      CORONARY ARTERY BYPASS GRAFT      x3; Last assessed - 17     Social History     Socioeconomic History    Marital status: /Civil Union     Spouse name: Not on file    Number of children: Not on file    Years of education: Not on file    Highest education level: Not on file   Occupational History    Occupation: Retired   Social Needs    Financial resource strain: Not on file    Food insecurity:     Worry: Not on file     Inability: Not on file   [email or web address] needs:     Medical: Not on file     Non-medical: Not on file   Tobacco Use    Smoking status: Former Smoker     Packs/day: 0 50     Years: 30 00     Pack years: 15 00     Types: Cigarettes     Last attempt to quit:      Years since quittin 2    Smokeless tobacco: Never Used    [facility]    Substance and Sexual Activity    Alcohol use: No    Drug use: No    Sexual activity: Not on file   Lifestyle    Physical activity:     Days per week: Not on file     Minutes per session: Not on file    Stress: Not on file   Relationships    Social connections:     Talks on phone: Not on file     Gets together: Not on file     Attends Congregational service: Not on file Active member of club or organization: Not on file     Attends meetings of clubs or organizations: Not on file     Relationship status: Not on file    Intimate partner violence:     Fear of current or ex partner: Not on file     Emotionally abused: Not on file     Physically abused: Not on file     Forced sexual activity: Not on file   Other Topics Concern    Not on file   Social History Narrative    Exercising regularly    Living with significant other     Family History   Adopted: Yes   Problem Relation Age of Onset    Diabetes Family     No Known Problems Mother      Atorvastatin; Levofloxacin; and Pravastatin  Current Outpatient Medications   Medication Sig Dispense Refill    Ascorbic Acid (VITAMIN C) 500 MG CAPS Take 1 capsule by mouth daily      aspirin (ECOTRIN) 325 mg EC tablet Take 1 tablet by mouth daily      diazepam (VALIUM) 5 mg tablet Take 1 tablet (5 mg total) by mouth every 8 (eight) hours as needed for anxiety (anxiety) 90 tablet 1    diltiazem (CARDIZEM CD) 240 mg 24 hr capsule TAKE ONE CAPSULE BY MOUTH EVERY DAY 90 capsule 3    glucose blood (AIMEE CONTOUR TEST) test strip by In Vitro route daily      insulin glargine (BASAGLAR KWIKPEN) injection pen 100 units/mL Inject under the skin      Insulin Pen Needle (B-D UF III MINI PEN NEEDLES) 31G X 5 MM MISC by Does not apply route      levothyroxine 50 mcg tablet TAKE 1 TABLET BY MOUTH DAILY 30 tablet 3    losartan (COZAAR) 50 mg tablet TAKE 1 TABLET BY MOUTH EVERY DAY 90 tablet 3    metFORMIN (GLUCOPHAGE-XR) 500 mg 24 hr tablet TAKE 1 TABLET BY MOUTH TWICE A  tablet 3    Omega-3 Fatty Acids (FISH OIL) 1,000 mg Take 1 capsule by mouth daily      sildenafil (REVATIO) 20 mg tablet Once a day prn 30 tablet 3    simvastatin (ZOCOR) 40 mg tablet Take 1 tablet by mouth      VITAMIN B COMPLEX-C PO Take 1 tablet by mouth daily      albuterol (VENTOLIN HFA) 90 mcg/act inhaler Inhale 2 puffs every 6 (six) hours as needed for wheezing (Patient not taking: Reported on 3/6/2019) 18 g 0    fexofenadine (ALLEGRA) 180 MG tablet Take 1 tablet by mouth daily as needed      Na Sulfate-K Sulfate-Mg Sulf (SUPREP BOWEL PREP KIT) 17 5-3 13-1 6 GM/177ML SOLN Take 2 Bottles by mouth see administration instructions Please follow the instructions from the office 2 Bottle 0     No current facility-administered medications for this visit  Blood pressure 140/60, pulse 80, temperature 98 °F (36 7 °C), temperature source Tympanic, resp  rate 14, height 5' 6" (1 676 m), weight 50 1 kg (110 lb 6 4 oz)  PHYSICAL EXAM: Physical Exam   Constitutional: He is oriented to person, place, and time  He appears well-developed  HENT:   Head: Normocephalic and atraumatic  Mouth/Throat: Oropharynx is clear and moist    Eyes: Pupils are equal, round, and reactive to light  Conjunctivae are normal  Right eye exhibits no discharge  Left eye exhibits no discharge  No scleral icterus  Neck: Neck supple  No JVD present  No tracheal deviation present  No thyromegaly present  Cardiovascular: Normal rate, regular rhythm, normal heart sounds and intact distal pulses  Exam reveals no gallop and no friction rub  No murmur heard  Pulmonary/Chest: Effort normal and breath sounds normal  No respiratory distress  He has no wheezes  He has no rales  He exhibits no tenderness  Abdominal: Soft  Bowel sounds are normal  He exhibits no distension and no mass  There is no tenderness  There is no rebound and no guarding  No hernia  Musculoskeletal: He exhibits no edema  Lymphadenopathy:     He has no cervical adenopathy  Neurological: He is alert and oriented to person, place, and time  Skin: Skin is warm and dry  No rash noted  No erythema  Psychiatric: He has a normal mood and affect   His behavior is normal  Thought content normal         Lab Results   Component Value Date    WBC 7 13 08/22/2016    HGB 14 6 08/22/2016    HCT 42 6 08/22/2016    MCV 88 08/22/2016     08/22/2016     Lab Results   Component Value Date    GLUCOSE 112 01/09/2014    CALCIUM 9 5 02/19/2019     (L) 01/09/2014    K 4 2 02/19/2019    CO2 25 02/19/2019     02/19/2019    BUN 23 02/19/2019    CREATININE 0 81 02/19/2019     Lab Results   Component Value Date    ALT 32 02/19/2019    AST 25 02/19/2019    ALKPHOS 69 02/19/2019    BILITOT 0 3 07/31/2014     No results found for: INR, PROTIME    Ct Chest High Resolution    Result Date: 12/22/2017  Impression: No significant change in bronchiectasis and groundglass density in the left lower lobe, presumably reflecting sequela of chronic infectious process  Stable mild groundglass densities in the left upper lobe  Workstation performed: [id number]       ASSESSMENT & PLAN:    Rectal bleeding  Rectal bleeding appear to most likely from hemorrhoids  Rule out colorectal lesions including polyps or malignancy  Patient also never had colonoscopy in the past     -Schedule for colonoscopy  -High-fiber diet     -Patient was given instructions about the colonoscopy prep     -Patient was explained about  the risks and benefits of the procedure  Risks including but not limited to bleeding, infection, perforation were explained in detail  Also explained about less than 100% sensitivity with the exam and other alternatives

## 2019-03-06 NOTE — ASSESSMENT & PLAN NOTE
Rectal bleeding appear to most likely from hemorrhoids  Rule out colorectal lesions including polyps or malignancy  Patient also never had colonoscopy in the past     -Schedule for colonoscopy  -High-fiber diet     -Patient was given instructions about the colonoscopy prep     -Patient was explained about  the risks and benefits of the procedure  Risks including but not limited to bleeding, infection, perforation were explained in detail  Also explained about less than 100% sensitivity with the exam and other alternatives

## 2019-03-06 NOTE — PATIENT INSTRUCTIONS
Patient is scheduled for a colonoscopy on 4/10/19 at [facility] with Dr Donahue Sessions   Patient is a diabetic   suprep provided at check out

## 2019-03-14 ENCOUNTER — ANESTHESIA EVENT (OUTPATIENT)
Dept: PERIOP | Facility: AMBULARY SURGERY CENTER | Age: 69
End: 2019-03-14
Payer: MEDICARE

## 2019-03-27 ENCOUNTER — ANESTHESIA (OUTPATIENT)
Dept: PERIOP | Facility: AMBULARY SURGERY CENTER | Age: 69
End: 2019-03-27
Payer: MEDICARE

## 2019-03-27 ENCOUNTER — HOSPITAL ENCOUNTER (OUTPATIENT)
Facility: AMBULARY SURGERY CENTER | Age: 69
Setting detail: OUTPATIENT SURGERY
Discharge: HOME | End: 2019-03-27
Attending: INTERNAL MEDICINE | Admitting: INTERNAL MEDICINE
Payer: MEDICARE

## 2019-03-27 ENCOUNTER — TELEPHONE (OUTPATIENT)
Dept: GASTROENTEROLOGY | Facility: AMBULARY SURGERY CENTER | Age: 69
End: 2019-03-27

## 2019-03-27 VITALS
DIASTOLIC BLOOD PRESSURE: 66 MMHG | RESPIRATION RATE: 16 BRPM | HEART RATE: 66 BPM | HEIGHT: 64 IN | OXYGEN SATURATION: 96 % | WEIGHT: 110 LBS | SYSTOLIC BLOOD PRESSURE: 128 MMHG | TEMPERATURE: 97.1 F | BODY MASS INDEX: 18.78 KG/M2

## 2019-03-27 DIAGNOSIS — K64.8 INTERNAL HEMORRHOID: Primary | ICD-10-CM

## 2019-03-27 DIAGNOSIS — K62.5 RECTAL BLEEDING: ICD-10-CM

## 2019-03-27 PROCEDURE — 88305 TISSUE EXAM BY PATHOLOGIST: CPT | Performed by: PATHOLOGY

## 2019-03-27 PROCEDURE — 45385 COLONOSCOPY W/LESION REMOVAL: CPT | Performed by: INTERNAL MEDICINE

## 2019-03-27 PROCEDURE — 45380 COLONOSCOPY AND BIOPSY: CPT | Performed by: INTERNAL MEDICINE

## 2019-03-27 RX ORDER — MEPERIDINE HYDROCHLORIDE 25 MG/ML
12.5 INJECTION INTRAMUSCULAR; INTRAVENOUS; SUBCUTANEOUS AS NEEDED
Status: DISCONTINUED | OUTPATIENT
Start: 2019-03-27 | End: 2019-03-27 | Stop reason: HOSPADM

## 2019-03-27 RX ORDER — HYDROCORTISONE ACETATE 25 MG/1
25 SUPPOSITORY RECTAL 2 TIMES DAILY PRN
Qty: 12 SUPPOSITORY | Refills: 1 | Status: SHIPPED | OUTPATIENT
Start: 2019-03-27 | End: 2020-10-16 | Stop reason: CLARIF

## 2019-03-27 RX ORDER — ALBUTEROL SULFATE 2.5 MG/3ML
2.5 SOLUTION RESPIRATORY (INHALATION) ONCE AS NEEDED
Status: DISCONTINUED | OUTPATIENT
Start: 2019-03-27 | End: 2019-03-27 | Stop reason: HOSPADM

## 2019-03-27 RX ORDER — PROPOFOL 10 MG/ML
INJECTION, EMULSION INTRAVENOUS AS NEEDED
Status: DISCONTINUED | OUTPATIENT
Start: 2019-03-27 | End: 2019-03-27 | Stop reason: SURG

## 2019-03-27 RX ORDER — SODIUM CHLORIDE, SODIUM LACTATE, POTASSIUM CHLORIDE, CALCIUM CHLORIDE 600; 310; 30; 20 MG/100ML; MG/100ML; MG/100ML; MG/100ML
125 INJECTION, SOLUTION INTRAVENOUS CONTINUOUS
Status: DISCONTINUED | OUTPATIENT
Start: 2019-03-27 | End: 2019-03-27 | Stop reason: HOSPADM

## 2019-03-27 RX ORDER — ONDANSETRON 2 MG/ML
4 INJECTION INTRAMUSCULAR; INTRAVENOUS ONCE AS NEEDED
Status: DISCONTINUED | OUTPATIENT
Start: 2019-03-27 | End: 2019-03-27 | Stop reason: HOSPADM

## 2019-03-27 RX ORDER — PROPOFOL 10 MG/ML
INJECTION, EMULSION INTRAVENOUS CONTINUOUS PRN
Status: DISCONTINUED | OUTPATIENT
Start: 2019-03-27 | End: 2019-03-27 | Stop reason: SURG

## 2019-03-27 RX ORDER — SODIUM CHLORIDE 9 MG/ML
INJECTION, SOLUTION INTRAVENOUS CONTINUOUS PRN
Status: DISCONTINUED | OUTPATIENT
Start: 2019-03-27 | End: 2019-03-27 | Stop reason: SURG

## 2019-03-27 RX ADMIN — PROPOFOL 100 MCG/KG/MIN: 10 INJECTION, EMULSION INTRAVENOUS at 08:07

## 2019-03-27 RX ADMIN — PROPOFOL 30 MG: 10 INJECTION, EMULSION INTRAVENOUS at 08:07

## 2019-03-27 RX ADMIN — PROPOFOL 40 MG: 10 INJECTION, EMULSION INTRAVENOUS at 08:06

## 2019-03-27 RX ADMIN — SODIUM CHLORIDE: 0.9 INJECTION, SOLUTION INTRAVENOUS at 07:59

## 2019-03-27 RX ADMIN — PROPOFOL 70 MG: 10 INJECTION, EMULSION INTRAVENOUS at 08:05

## 2019-03-27 NOTE — TELEPHONE ENCOUNTER
----- Message from Teri Toro PA-C sent at 3/27/2019  4:13 PM EDT -----  Regarding: RE: MEDICATION  Contact: [phone]  I sent the cream version with applicator to insert in rectum instead as this appears to be covered by insurance while the suppository is not  If these are still too expensive, patient should get OTC preparation H suppositories instead  ----- Message -----  From: Riley Santos  Sent: 3/27/2019   4:07 PM  To: [facility] Provider  Subject: MEDICATION                                       [facility] CALLED THE PT-THIS SUPPOSITORY IS TOO EXPENSIVE IS THERE SOMETHING COMPARABLE PLEASE ORDER AND ADVISE

## 2019-03-27 NOTE — ANESTHESIA PREPROCEDURE EVALUATION
Review of Systems/Medical History          Cardiovascular  Exercise tolerance (METS): >4,  Hyperlipidemia, Hypertension , CAD , CAD status: 3VD, History of CABG,    Pulmonary       GI/Hepatic            Endo/Other  Diabetes well controlled type 2 , History of thyroid disease , hypothyroidism,      GYN       Hematology   Musculoskeletal       Neurology   Psychology           Physical Exam    Airway    Mallampati score: III  TM Distance: >3 FB  Neck ROM: full     Dental       Cardiovascular      Pulmonary      Other Findings        Anesthesia Plan  ASA Score- 2     Anesthesia Type- IV sedation with anesthesia with ASA Monitors  Additional Monitors:   Airway Plan:     Comment: I have seen the patient and reviewed the history  Patient to receive IV sedation with full ASA monitors  Risks discussed with the patient, consent signed        Plan Factors-    Induction- intravenous  Postoperative Plan-     Informed Consent- Anesthetic plan and risks discussed with patient  I personally reviewed this patient with the CRNA  Discussed and agreed on the Anesthesia Plan with the CRNA  Katie Alex

## 2019-03-27 NOTE — ANESTHESIA POSTPROCEDURE EVALUATION
Post-Op Assessment Note    CV Status:  Stable    Pain management: adequate     Mental Status:  Alert and awake   Hydration Status:  Euvolemic   PONV Controlled:  Controlled   Airway Patency:  Patent   Post Op Vitals Reviewed: Yes      Staff: Anesthesiologist, CRNA           /70 (03/27/19 0833)    Temp (!) 97 1 °F (36 2 °C) (03/27/19 0833)    Pulse 66 (03/27/19 0833)   Resp 18 (03/27/19 0833)    SpO2 99 % (03/27/19 0833)

## 2019-04-01 ENCOUNTER — TELEPHONE (OUTPATIENT)
Dept: GASTROENTEROLOGY | Facility: AMBULARY SURGERY CENTER | Age: 69
End: 2019-04-01

## 2019-04-19 ENCOUNTER — OFFICE VISIT (OUTPATIENT)
Dept: PULMONOLOGY | Facility: CLINIC | Age: 69
End: 2019-04-19
Payer: MEDICARE

## 2019-04-19 VITALS
BODY MASS INDEX: 18.51 KG/M2 | HEART RATE: 83 BPM | WEIGHT: 115.2 LBS | HEIGHT: 66 IN | SYSTOLIC BLOOD PRESSURE: 138 MMHG | TEMPERATURE: 97 F | OXYGEN SATURATION: 91 % | DIASTOLIC BLOOD PRESSURE: 74 MMHG

## 2019-04-19 DIAGNOSIS — J47.9 BRONCHIECTASIS WITHOUT COMPLICATION (HCC): ICD-10-CM

## 2019-04-19 DIAGNOSIS — R93.89 ABNORMAL CHEST CT: Primary | ICD-10-CM

## 2019-04-19 DIAGNOSIS — I34.0 MITRAL VALVE INSUFFICIENCY, UNSPECIFIED ETIOLOGY: ICD-10-CM

## 2019-04-19 PROCEDURE — 99213 OFFICE O/P EST LOW 20 MIN: CPT | Performed by: INTERNAL MEDICINE

## 2019-04-27 DIAGNOSIS — E03.9 HYPOTHYROIDISM, UNSPECIFIED TYPE: ICD-10-CM

## 2019-04-27 RX ORDER — LEVOTHYROXINE SODIUM 0.05 MG/1
TABLET ORAL
Qty: 30 TABLET | Refills: 3 | Status: SHIPPED | OUTPATIENT
Start: 2019-04-27 | End: 2019-08-19 | Stop reason: SDUPTHER

## 2019-06-04 ENCOUNTER — OFFICE VISIT (OUTPATIENT)
Dept: GASTROENTEROLOGY | Facility: AMBULARY SURGERY CENTER | Age: 69
End: 2019-06-04
Payer: MEDICARE

## 2019-06-04 ENCOUNTER — HOSPITAL ENCOUNTER (EMERGENCY)
Facility: HOSPITAL | Age: 69
Discharge: HOME | End: 2019-06-04
Attending: EMERGENCY MEDICINE | Admitting: EMERGENCY MEDICINE
Payer: MEDICARE

## 2019-06-04 VITALS
DIASTOLIC BLOOD PRESSURE: 58 MMHG | BODY MASS INDEX: 17.52 KG/M2 | HEIGHT: 66 IN | SYSTOLIC BLOOD PRESSURE: 112 MMHG | TEMPERATURE: 98.3 F | WEIGHT: 109 LBS | HEART RATE: 70 BPM | RESPIRATION RATE: 14 BRPM

## 2019-06-04 VITALS
DIASTOLIC BLOOD PRESSURE: 66 MMHG | BODY MASS INDEX: 17.52 KG/M2 | OXYGEN SATURATION: 97 % | RESPIRATION RATE: 16 BRPM | TEMPERATURE: 97.6 F | SYSTOLIC BLOOD PRESSURE: 142 MMHG | WEIGHT: 109 LBS | HEART RATE: 65 BPM | HEIGHT: 66 IN

## 2019-06-04 DIAGNOSIS — K64.8 INTERNAL HEMORRHOIDS: ICD-10-CM

## 2019-06-04 DIAGNOSIS — K92.2 LOWER GI BLEED: Primary | ICD-10-CM

## 2019-06-04 DIAGNOSIS — K62.5 RECTAL BLEEDING: ICD-10-CM

## 2019-06-04 DIAGNOSIS — K64.8 BLEEDING INTERNAL HEMORRHOIDS: Primary | ICD-10-CM

## 2019-06-04 LAB
ALBUMIN SERPL BCP-MCNC: 3.7 G/DL (ref 3.5–5)
ALP SERPL-CCNC: 80 U/L (ref 46–116)
ALT SERPL W P-5'-P-CCNC: 26 U/L (ref 12–78)
ANION GAP SERPL CALCULATED.3IONS-SCNC: 6 MMOL/L (ref 4–13)
APTT PPP: 32 SECONDS (ref 26–38)
AST SERPL W P-5'-P-CCNC: 22 U/L (ref 5–45)
BASOPHILS # BLD AUTO: 0.04 THOUSANDS/ΜL (ref 0–0.1)
BASOPHILS NFR BLD AUTO: 1 % (ref 0–1)
BILIRUB SERPL-MCNC: 0.3 MG/DL (ref 0.2–1)
BUN SERPL-MCNC: 16 MG/DL (ref 5–25)
CALCIUM SERPL-MCNC: 9.5 MG/DL (ref 8.3–10.1)
CHLORIDE SERPL-SCNC: 102 MMOL/L (ref 100–108)
CO2 SERPL-SCNC: 28 MMOL/L (ref 21–32)
CREAT SERPL-MCNC: 0.88 MG/DL (ref 0.6–1.3)
EOSINOPHIL # BLD AUTO: 0.22 THOUSAND/ΜL (ref 0–0.61)
EOSINOPHIL NFR BLD AUTO: 3 % (ref 0–6)
ERYTHROCYTE [DISTWIDTH] IN BLOOD BY AUTOMATED COUNT: 13.5 % (ref 11.6–15.1)
GFR SERPL CREATININE-BSD FRML MDRD: 87 ML/MIN/1.73SQ M
GLUCOSE SERPL-MCNC: 95 MG/DL (ref 65–140)
HCT VFR BLD AUTO: 39.9 % (ref 36.5–49.3)
HGB BLD-MCNC: 13.7 G/DL (ref 12–17)
IMM GRANULOCYTES # BLD AUTO: 0.03 THOUSAND/UL (ref 0–0.2)
IMM GRANULOCYTES NFR BLD AUTO: 1 % (ref 0–2)
INR PPP: 0.97 (ref 0.86–1.17)
LYMPHOCYTES # BLD AUTO: 0.78 THOUSANDS/ΜL (ref 0.6–4.47)
LYMPHOCYTES NFR BLD AUTO: 12 % (ref 14–44)
MCH RBC QN AUTO: 30.2 PG (ref 26.8–34.3)
MCHC RBC AUTO-ENTMCNC: 34.3 G/DL (ref 31.4–37.4)
MCV RBC AUTO: 88 FL (ref 82–98)
MONOCYTES # BLD AUTO: 0.55 THOUSAND/ΜL (ref 0.17–1.22)
MONOCYTES NFR BLD AUTO: 9 % (ref 4–12)
NEUTROPHILS # BLD AUTO: 4.86 THOUSANDS/ΜL (ref 1.85–7.62)
NEUTS SEG NFR BLD AUTO: 74 % (ref 43–75)
NRBC BLD AUTO-RTO: 0 /100 WBCS
PLATELET # BLD AUTO: 236 THOUSANDS/UL (ref 149–390)
PMV BLD AUTO: 9.3 FL (ref 8.9–12.7)
POTASSIUM SERPL-SCNC: 4 MMOL/L (ref 3.5–5.3)
PROT SERPL-MCNC: 7.9 G/DL (ref 6.4–8.2)
PROTHROMBIN TIME: 12.6 SECONDS (ref 11.8–14.2)
RBC # BLD AUTO: 4.54 MILLION/UL (ref 3.88–5.62)
SODIUM SERPL-SCNC: 136 MMOL/L (ref 136–145)
WBC # BLD AUTO: 6.48 THOUSAND/UL (ref 4.31–10.16)

## 2019-06-04 PROCEDURE — 99283 EMERGENCY DEPT VISIT LOW MDM: CPT | Performed by: EMERGENCY MEDICINE

## 2019-06-04 PROCEDURE — 85730 THROMBOPLASTIN TIME PARTIAL: CPT | Performed by: EMERGENCY MEDICINE

## 2019-06-04 PROCEDURE — 36415 COLL VENOUS BLD VENIPUNCTURE: CPT | Performed by: EMERGENCY MEDICINE

## 2019-06-04 PROCEDURE — 85025 COMPLETE CBC W/AUTO DIFF WBC: CPT | Performed by: EMERGENCY MEDICINE

## 2019-06-04 PROCEDURE — 99285 EMERGENCY DEPT VISIT HI MDM: CPT

## 2019-06-04 PROCEDURE — 85610 PROTHROMBIN TIME: CPT | Performed by: EMERGENCY MEDICINE

## 2019-06-04 PROCEDURE — 99213 OFFICE O/P EST LOW 20 MIN: CPT | Performed by: PHYSICIAN ASSISTANT

## 2019-06-04 PROCEDURE — 80053 COMPREHEN METABOLIC PANEL: CPT | Performed by: EMERGENCY MEDICINE

## 2019-06-24 DIAGNOSIS — J30.2 SEASONAL ALLERGIES: Primary | ICD-10-CM

## 2019-06-24 RX ORDER — FEXOFENADINE HCL 180 MG/1
TABLET ORAL
Qty: 10 TABLET | Refills: 0 | Status: SHIPPED | OUTPATIENT
Start: 2019-06-24 | End: 2020-04-06

## 2019-07-16 DIAGNOSIS — E11.8 TYPE 2 DIABETES MELLITUS WITH COMPLICATION, UNSPECIFIED WHETHER LONG TERM INSULIN USE: Primary | ICD-10-CM

## 2019-07-16 RX ORDER — INSULIN GLARGINE 100 [IU]/ML
INJECTION, SOLUTION SUBCUTANEOUS
Qty: 1 PEN | Refills: 7 | Status: SHIPPED | OUTPATIENT
Start: 2019-07-16 | End: 2019-07-24 | Stop reason: SDUPTHER

## 2019-07-19 ENCOUNTER — APPOINTMENT (OUTPATIENT)
Dept: LAB | Facility: CLINIC | Age: 69
End: 2019-07-19
Payer: MEDICARE

## 2019-07-19 DIAGNOSIS — E13.9 DIABETES 1.5, MANAGED AS TYPE 2 (HCC): ICD-10-CM

## 2019-07-19 DIAGNOSIS — E11.8 TYPE 2 DIABETES MELLITUS WITH COMPLICATION, UNSPECIFIED WHETHER LONG TERM INSULIN USE: ICD-10-CM

## 2019-07-19 LAB
ALBUMIN SERPL BCP-MCNC: 3.7 G/DL (ref 3.5–5)
ALP SERPL-CCNC: 81 U/L (ref 46–116)
ALT SERPL W P-5'-P-CCNC: 27 U/L (ref 12–78)
ANION GAP SERPL CALCULATED.3IONS-SCNC: 8 MMOL/L (ref 4–13)
AST SERPL W P-5'-P-CCNC: 25 U/L (ref 5–45)
BILIRUB SERPL-MCNC: 0.4 MG/DL (ref 0.2–1)
BUN SERPL-MCNC: 17 MG/DL (ref 5–25)
CALCIUM SERPL-MCNC: 9.4 MG/DL (ref 8.3–10.1)
CHLORIDE SERPL-SCNC: 103 MMOL/L (ref 100–108)
CHOLEST SERPL-MCNC: 138 MG/DL (ref 50–200)
CO2 SERPL-SCNC: 27 MMOL/L (ref 21–32)
CREAT SERPL-MCNC: 0.82 MG/DL (ref 0.6–1.3)
CREAT UR-MCNC: 93.1 MG/DL
EST. AVERAGE GLUCOSE BLD GHB EST-MCNC: 137 MG/DL
GFR SERPL CREATININE-BSD FRML MDRD: 90 ML/MIN/1.73SQ M
GLUCOSE P FAST SERPL-MCNC: 75 MG/DL (ref 65–99)
HBA1C MFR BLD: 6.4 % (ref 4.2–6.3)
HDLC SERPL-MCNC: 53 MG/DL (ref 40–60)
LDLC SERPL CALC-MCNC: 72 MG/DL (ref 0–100)
MICROALBUMIN UR-MCNC: 60.8 MG/L (ref 0–20)
MICROALBUMIN/CREAT 24H UR: 65 MG/G CREATININE (ref 0–30)
POTASSIUM SERPL-SCNC: 4.2 MMOL/L (ref 3.5–5.3)
PROT SERPL-MCNC: 7.8 G/DL (ref 6.4–8.2)
SODIUM SERPL-SCNC: 138 MMOL/L (ref 136–145)
TRIGL SERPL-MCNC: 66 MG/DL

## 2019-07-19 PROCEDURE — 82570 ASSAY OF URINE CREATININE: CPT

## 2019-07-19 PROCEDURE — 83036 HEMOGLOBIN GLYCOSYLATED A1C: CPT

## 2019-07-19 PROCEDURE — 82043 UR ALBUMIN QUANTITATIVE: CPT

## 2019-07-19 PROCEDURE — 80061 LIPID PANEL: CPT

## 2019-07-19 PROCEDURE — 36415 COLL VENOUS BLD VENIPUNCTURE: CPT

## 2019-07-19 PROCEDURE — 80053 COMPREHEN METABOLIC PANEL: CPT

## 2019-07-24 DIAGNOSIS — E11.8 TYPE 2 DIABETES MELLITUS WITH COMPLICATION, UNSPECIFIED WHETHER LONG TERM INSULIN USE: ICD-10-CM

## 2019-07-25 ENCOUNTER — OFFICE VISIT (OUTPATIENT)
Dept: INTERNAL MEDICINE CLINIC | Facility: CLINIC | Age: 69
End: 2019-07-25
Payer: MEDICARE

## 2019-07-25 VITALS
TEMPERATURE: 98.3 F | SYSTOLIC BLOOD PRESSURE: 132 MMHG | RESPIRATION RATE: 16 BRPM | OXYGEN SATURATION: 95 % | DIASTOLIC BLOOD PRESSURE: 62 MMHG | WEIGHT: 114.2 LBS | HEIGHT: 66 IN | BODY MASS INDEX: 18.35 KG/M2 | HEART RATE: 74 BPM

## 2019-07-25 DIAGNOSIS — Z11.59 ENCOUNTER FOR HEPATITIS C SCREENING TEST FOR LOW RISK PATIENT: ICD-10-CM

## 2019-07-25 DIAGNOSIS — E03.9 HYPOTHYROIDISM, UNSPECIFIED TYPE: Primary | ICD-10-CM

## 2019-07-25 DIAGNOSIS — T78.40XS ALLERGIC STATE, SEQUELA: ICD-10-CM

## 2019-07-25 PROBLEM — T78.40XA ALLERGY: Status: ACTIVE | Noted: 2019-07-25

## 2019-07-25 PROCEDURE — 99214 OFFICE O/P EST MOD 30 MIN: CPT | Performed by: INTERNAL MEDICINE

## 2019-07-25 PROCEDURE — 1124F ACP DISCUSS-NO DSCNMKR DOCD: CPT | Performed by: INTERNAL MEDICINE

## 2019-07-25 NOTE — PROGRESS NOTES
BMI Counseling: Body mass index is 18 43 kg/m²  Discussed the patient's BMI with him  The BMI is above average  BMI counseling and education was provided to the patient  Nutrition recommendations include reducing portion sizes  Assessment/Plan:    Hypothyroidism  Hypothyroidism controlled the patient is currently euthyroid I will be ordering a TSH prior to the next office visit and the patient will continue with current medical regiment; we will continue to monitor the patient's progress  The patient does report me some swelling in his neck region after smelling a dead mouse which is likely a mouse urine allergy he is concerned that it might be related to his hypothyroidism he had doubled his levothyroxine x1 week and his symptoms of swelling in the neck region improved currently is asymptomatic I will check a 3rd generation TSH  Encounter for hepatitis C screening test for low risk patient  Counseled, will check hepatitis C antibody    Allergy  Patient had episode of swelling in the neck region after swelling a that the mouse currently he is asymptomatic he did not have any difficulty with swallowing no difficulty with reading a difficulty with tongue swelling no rash, no hives  He does not want a EpiPen will check him for mouth urine and serum allergy, I have explained to him he should wear a mask when disposing of mice  If he has any future symptoms he is to notify me immediately, if he develops any symptoms of anaphylaxis, throat swelling, shortness of breath, tongue swelling he is to go immediately to the ER he did not want to carry an EpiPen  Problem List Items Addressed This Visit        Endocrine    Hypothyroidism - Primary     Hypothyroidism controlled the patient is currently euthyroid I will be ordering a TSH prior to the next office visit and the patient will continue with current medical regiment; we will continue to monitor the patient's progress    The patient does report me some swelling in his neck region after smelling a dead mouse which is likely a mouse urine allergy he is concerned that it might be related to his hypothyroidism he had doubled his levothyroxine x1 week and his symptoms of swelling in the neck region improved currently is asymptomatic I will check a 3rd generation TSH  Relevant Orders    TSH, 3rd generation       Other    Allergy     Patient had episode of swelling in the neck region after swelling a that the mouse currently he is asymptomatic he did not have any difficulty with swallowing no difficulty with reading a difficulty with tongue swelling no rash, no hives  He does not want a EpiPen will check him for mouth urine and serum allergy, I have explained to him he should wear a mask when disposing of mice  If he has any future symptoms he is to notify me immediately, if he develops any symptoms of anaphylaxis, throat swelling, shortness of breath, tongue swelling he is to go immediately to the ER he did not want to carry an EpiPen  Relevant Orders    Mouse, urine IgE    Mouse, serum IgE    Encounter for hepatitis C screening test for low risk patient     Counseled, will check hepatitis C antibody         Relevant Orders    Hepatitis C antibody          Return to office  as scheduled  call if any problems  Subjective:      Patient ID: Zulema Cedeño is a 77 y o  male  HPI 74-year old male coming in for a follow up visit regarding hypothyroidism, allergy to mouse, encounter for screening for hepatitis-C; The patient reports me compliant taking medications without untoward side effects the  The patient is here to review his medical condition, update me on the medical condition and the patient reports me no hospitalizations and no ER visits    the pt noticed with picking up mouse he developed elnarged lymphnode vs  Thyroid and the pt increase to 1 5 tab/day,  No dysphagia,  no shortness of breath, no tongue swelling, no throat swelling no rash, no hives symptoms did dissipate within a week after increasing the dose of levothyroxine he did return to his usual dose at this point time  The pt saw Dr   The following portions of the patient's history were reviewed and updated as appropriate: allergies, current medications, past family history, past medical history, past social history, past surgical history and problem list     Review of Systems   Constitutional: Negative for activity change, appetite change and unexpected weight change  HENT: Negative for congestion and postnasal drip  Eyes: Negative for visual disturbance  Respiratory: Negative for cough and shortness of breath  Cardiovascular: Negative for chest pain  Gastrointestinal: Negative for abdominal pain, diarrhea, nausea and vomiting  Neurological: Negative for dizziness, light-headedness and headaches  Hematological: Negative for adenopathy  Objective:    No follow-ups on file  No results found  Recent Results (from the past 96 [id number] hour(s))   POCT ECG    Impression    NSR 78 NSSTTWA   Echo complete with contrast if indicated    Narrative    [address]  [phone]    Transthoracic Echocardiogram  2D, M-mode, Doppler, and Color Doppler    Study date:  28-Sep-2017    Patient: Lionel Kirby  MR number: [id number]  Account number: [de-identified]  : 1950  Age: 74 years  Gender: Male  Status: Outpatient  [facility]: [facility]  Height: 64 in  Weight: 112 9 lb  BP: 120/ 70 mmHg    Indications: Cardiac murmur    Diagnoses: R01 1 - Cardiac murmur, unspecified    Sonographer:  ADELAIDA Dickinson, RDCS  Referring Physician:  Fiona Fernandez DO  Group:  [facility]  Interpreting Physician:  Jennifer Alves MD    SUMMARY    LEFT VENTRICLE:  Systolic function was normal  Ejection fraction was estimated to be 60 %  There were no regional wall motion abnormalities      RIGHT VENTRICLE:  The size was normal   Systolic function was mildly reduced  MITRAL VALVE:  There was mild regurgitation  TRICUSPID VALVE:  There was mild regurgitation  Pulmonary artery systolic pressure was within the normal range  PULMONIC VALVE:  There was trace regurgitation  HISTORY: PRIOR HISTORY: CAD; CABG; Hyperlipidemia; Hypertension; Hypothyroid; Diabetes Mellitus; Post polio syndrome    PROCEDURE: The study was performed in the [facility]  This was a routine study  The transthoracic approach was used  The study included complete 2D imaging, M-mode, complete spectral Doppler, and color Doppler  The  heart rate was 65 bpm, at the start of the study  Images were obtained from the parasternal, apical, subcostal, and suprasternal notch acoustic windows  Image quality was adequate  LEFT VENTRICLE: Size was normal  Systolic function was normal  Ejection fraction was estimated to be 60 %  There were no regional wall motion abnormalities  Wall thickness was normal  DOPPLER: Left ventricular diastolic function parameters  were normal     VENTRICULAR SEPTUM: There was normal motion  There was normal contour  RIGHT VENTRICLE: The size was normal  Systolic function was mildly reduced  Wall thickness was normal     LEFT ATRIUM: Size was normal     RIGHT ATRIUM: Size was normal     MITRAL VALVE: There was mild thickening of the valve  There was normal leaflet separation  DOPPLER: The transmitral velocity was within the normal range  There was no evidence for stenosis  There was mild regurgitation  AORTIC VALVE: The valve was trileaflet  Leaflets exhibited mildly increased thickness and lower normal cuspal separation  DOPPLER: Transaortic velocity was within the normal range  There was no evidence for stenosis  There was no  regurgitation  TRICUSPID VALVE: The valve structure was normal  There was normal leaflet separation   DOPPLER: The transtricuspid velocity was within the normal range  There was no evidence for stenosis  There was mild regurgitation  Pulmonary artery  systolic pressure was within the normal range  PULMONIC VALVE: Leaflets exhibited normal thickness, no calcification, and normal cuspal separation  DOPPLER: The transpulmonic velocity was within the normal range  There was trace regurgitation  PERICARDIUM: There was no pericardial effusion  AORTA: The root exhibited normal size      SYSTEM MEASUREMENT TABLES    2D  %FS: 33 82 %  AV Diam: 3 01 cm  EDV(Teich): 24 47 ml  EF(Cube): 71 01 %  EF(Teich): 64 83 %  ESV(Cube): 5 06 ml  ESV(Teich): 8 61 ml  IVSd: 1 38 cm  LA Area: 16 89 cm2  LA Diam: 4 28 cm  LVEDV MOD A4C: 62 16 ml  LVEF MOD A4C: 61 5 %  LVESV MOD A4C: 23 93 ml  LVIDd: 2 59 cm  LVIDs: 1 72 cm  LVLd A4C: 7 32 cm  LVLs A4C: 5 72 cm  LVPWd: 1 08 cm  RA Area: 9 22 cm2  RV Diam: 3 26 cm  SI(Cube): 8 05 ml/m2  SI(Teich): 10 3 ml/m2  SV MOD A4C: 38 23 ml  SV(Cube): 12 4 ml  SV(Teich): 15 87 ml    CW  TR MaxP 57 mmHg  TR Vmax: 2 14 m/s    MM  TAPSE: 1 4 cm    PW  E': 0 05 m/s  E/E': 25 04  MV A [place]: 1 34 m/s  MV Dec [place]: 5 38 m/s2  MV DecT: 251 7 ms  MV E [place]: 1 35 m/s  MV E/A Ratio: 1 01    [employer] [facility]redited [facility]    Prepared and electronically signed by    Hosea Trujillo MD  Signed 28-Sep-2017 17:41:27         Allergies   Allergen Reactions    Atorvastatin      Other reaction(s): Itching    Levofloxacin      Other reaction(s): GI Upset    Pravastatin      Other reaction(s): Itching       Past Medical History:   Diagnosis Date    3-vessel CAD     CAP (community acquired pneumonia)     Last assessed - 3/15/16    Cardiac murmur     Disease of thyroid gland     History of poliomyelitis     Hyperlipidemia     Hypertension     Methicillin resistant Staphylococcus aureus infection     Mitral valve insufficiency     Nonspecific abnormal results of function study of thyroid     Last assessed - 10/15/14     Past Surgical History:   Procedure Laterality Date    CARDIAC SURGERY  1997    CORONARY ARTERY BYPASS GRAFT      x3; Last assessed - 9/29/17    MO COLONOSCOPY FLX DX W/COLLJ SPEC WHEN PFRMD N/A 3/27/2019    Procedure: COLONOSCOPY;  Surgeon: Monica Mace MD;  [facility];   Service: Gastroenterology     Current Outpatient Medications on File Prior to Visit   Medication Sig Dispense Refill    aspirin (ECOTRIN) 325 mg EC tablet Take 1 tablet by mouth daily      diazepam (VALIUM) 5 mg tablet Take 1 tablet (5 mg total) by mouth every 8 (eight) hours as needed for anxiety (anxiety) 90 tablet 1    diltiazem (CARDIZEM CD) 240 mg 24 hr capsule TAKE ONE CAPSULE BY MOUTH EVERY DAY 90 capsule 3    fexofenadine (ALLEGRA) 180 MG tablet TAKE 1 TABLET BY MOUTH EVERY DAY AS NEEDED FOR ALLERGIES 10 tablet 0    glucose blood (AIMEE CONTOUR TEST) test strip by In Vitro route daily      hydrocortisone (ANUSOL-HC, PROCTOSOL HC,) 2 5 % rectal cream Insert into the rectum 2 (two) times a day 30 g 5    insulin glargine (BASAGLAR KWIKPEN) 100 units/mL injection pen Inject 20 Units under the skin daily 3 pen 1    Insulin Pen Needle (B-D UF III MINI PEN NEEDLES) 31G X 5 MM MISC by Does not apply route      levothyroxine 50 mcg tablet TAKE 1 TABLET BY MOUTH DAILY 30 tablet 3    losartan (COZAAR) 50 mg tablet TAKE 1 TABLET BY MOUTH EVERY DAY 90 tablet 3    metFORMIN (GLUCOPHAGE-XR) 500 mg 24 hr tablet TAKE 1 TABLET BY MOUTH TWICE A  tablet 3    Omega-3 Fatty Acids (FISH OIL) 1,000 mg Take 1 capsule by mouth daily      sildenafil (REVATIO) 20 mg tablet Once a day prn 30 tablet 3    simvastatin (ZOCOR) 40 mg tablet Take 1 tablet by mouth      VITAMIN B COMPLEX-C PO Take 1 tablet by mouth daily      hydrocortisone (ANUSOL-HC) 25 mg suppository Insert 1 suppository (25 mg total) into the rectum 2 (two) times a day as needed for hemorrhoids (Patient not taking: Reported on 7/25/2019) 12 suppository 1     No current facility-administered medications on file prior to visit        Family History   Adopted: Yes   Problem Relation Age of Onset    Diabetes Family     No Known Problems Mother      Social History     Socioeconomic History    Marital status: /Civil Union     Spouse name: Not on file    Number of children: Not on file    Years of education: Not on file    Highest education level: Not on file   Occupational History    Occupation: Retired   Social Needs    Financial resource strain: Not on file    Food insecurity:     Worry: Not on file     Inability: Not on file   Source[employer] needs:     Medical: Not on file     Non-medical: Not on file   Tobacco Use    Smoking status: Former Smoker     Packs/day: 0 50     Years: 30 00     Pack years: 15 00     Types: Cigarettes     Last attempt to quit:      Years since quittin 5    Smokeless tobacco: Never Used    Tobacco comment:    Substance and Sexual Activity    Alcohol use: No    Drug use: No    Sexual activity: Not on file   Lifestyle    Physical activity:     Days per week: Not on file     Minutes per session: Not on file    Stress: Not on file   Relationships    Social connections:     Talks on phone: Not on file     Gets together: Not on file     Attends Rastafari service: Not on file     Active member of club or organization: Not on file     Attends meetings of clubs or organizations: Not on file     Relationship status: Not on file    Intimate partner violence:     Fear of current or ex partner: Not on file     Emotionally abused: Not on file     Physically abused: Not on file     Forced sexual activity: Not on file   Other Topics Concern    Not on file   Social History Narrative    Exercising regularly    Living with significant other     Vitals:    19 1459   BP: 132/62   BP Location: Left arm   Patient Position: Sitting   Cuff Size: Adult   Pulse: 74   Resp: 16   Temp: 98 3 °F (36 8 °C)   TempSrc: Oral   SpO2: 95%   Weight: 51 8 kg (114 lb 3 2 oz)   Height: 5' 6" (1 676 m)     Results for orders placed or performed in visit on 07/19/19   Microalbumin / creatinine urine ratio   Result Value Ref Range    Creatinine, Ur 93 1 mg/dL    Microalbum  ,U,Random 60 8 (H) 0 0 - 20 0 mg/L    Microalb Creat Ratio 65 (H) 0 - 30 mg/g creatinine   Comprehensive metabolic panel   Result Value Ref Range    Sodium 138 136 - 145 mmol/L    Potassium 4 2 3 5 - 5 3 mmol/L    Chloride 103 100 - 108 mmol/L    CO2 27 21 - 32 mmol/L    ANION GAP 8 4 - 13 mmol/L    BUN 17 5 - 25 mg/dL    Creatinine 0 82 0 60 - 1 30 mg/dL    Glucose, Fasting 75 65 - 99 mg/dL    Calcium 9 4 8 3 - 10 1 mg/dL    AST 25 5 - 45 U/L    ALT 27 12 - 78 U/L    Alkaline Phosphatase 81 46 - 116 U/L    Total Protein 7 8 6 4 - 8 2 g/dL    Albumin 3 7 3 5 - 5 0 g/dL    Total Bilirubin 0 40 0 20 - 1 00 mg/dL    eGFR 90 ml/min/1 73sq m   Hemoglobin A1C   Result Value Ref Range    Hemoglobin A1C 6 4 (H) 4 2 - 6 3 %     mg/dl   Lipid Panel with Direct LDL reflex   Result Value Ref Range    Cholesterol 138 50 - 200 mg/dL    Triglycerides 66 <=150 mg/dL    HDL, Direct 53 40 - 60 mg/dL    LDL Calculated 72 0 - 100 mg/dL     Weight (last 2 days)     Date/Time   Weight    07/25/19 1459   51 8 (114 2)            Body mass index is 18 43 kg/m²  BP      Temp      Pulse     Resp      SpO2        Vitals:    07/25/19 1459   Weight: 51 8 kg (114 lb 3 2 oz)     Vitals:    07/25/19 1459   Weight: 51 8 kg (114 lb 3 2 oz)       /62 (BP Location: Left arm, Patient Position: Sitting, Cuff Size: Adult)   Pulse 74   Temp 98 3 °F (36 8 °C) (Oral)   Resp 16   Ht 5' 6" (1 676 m)   Wt 51 8 kg (114 lb 3 2 oz)   SpO2 95%   BMI 18 43 kg/m²          Physical Exam   Constitutional: He appears well-developed and well-nourished  No distress  HENT:   Head: Normocephalic and atraumatic     Right Ear: External ear normal    Left Ear: External ear normal    Mouth/Throat: Oropharynx is clear and moist    Eyes: Pupils are equal, round, and reactive to light  Conjunctivae are normal  Right eye exhibits no discharge  Left eye exhibits no discharge  No scleral icterus  Neck: Neck supple  Cardiovascular: Normal rate, regular rhythm and normal heart sounds  Exam reveals no gallop and no friction rub  No murmur heard  Pulmonary/Chest: No respiratory distress  He has no wheezes  He has no rales  Abdominal: Soft  Bowel sounds are normal  He exhibits no distension and no mass  There is no tenderness  There is no rebound and no guarding  Musculoskeletal: He exhibits no edema or deformity  Lymphadenopathy:     He has no cervical adenopathy  Neurological: He is alert  Skin: He is not diaphoretic  Psychiatric: He has a normal mood and affect

## 2019-07-26 ENCOUNTER — TRANSCRIBE ORDERS (OUTPATIENT)
Dept: LAB | Facility: CLINIC | Age: 69
End: 2019-07-26

## 2019-07-26 ENCOUNTER — APPOINTMENT (OUTPATIENT)
Dept: LAB | Facility: CLINIC | Age: 69
End: 2019-07-26
Payer: MEDICARE

## 2019-07-26 DIAGNOSIS — T78.40XS ALLERGIC STATE, SEQUELA: ICD-10-CM

## 2019-07-26 DIAGNOSIS — E03.9 HYPOTHYROIDISM, UNSPECIFIED TYPE: ICD-10-CM

## 2019-07-26 DIAGNOSIS — E11.8 TYPE 2 DIABETES MELLITUS WITH COMPLICATION, UNSPECIFIED WHETHER LONG TERM INSULIN USE: ICD-10-CM

## 2019-07-26 DIAGNOSIS — Z11.59 ENCOUNTER FOR HEPATITIS C SCREENING TEST FOR LOW RISK PATIENT: ICD-10-CM

## 2019-07-26 LAB — TSH SERPL DL<=0.05 MIU/L-ACNC: 1.83 UIU/ML (ref 0.36–3.74)

## 2019-07-26 PROCEDURE — 86803 HEPATITIS C AB TEST: CPT

## 2019-07-26 PROCEDURE — 86003 ALLG SPEC IGE CRUDE XTRC EA: CPT

## 2019-07-26 PROCEDURE — 36415 COLL VENOUS BLD VENIPUNCTURE: CPT

## 2019-07-26 PROCEDURE — 84443 ASSAY THYROID STIM HORMONE: CPT

## 2019-07-26 NOTE — ASSESSMENT & PLAN NOTE
Hypothyroidism controlled the patient is currently euthyroid I will be ordering a TSH prior to the next office visit and the patient will continue with current medical regiment; we will continue to monitor the patient's progress  The patient does report me some swelling in his neck region after smelling a dead mouse which is likely a mouse urine allergy he is concerned that it might be related to his hypothyroidism he had doubled his levothyroxine x1 week and his symptoms of swelling in the neck region improved currently is asymptomatic I will check a 3rd generation TSH

## 2019-07-26 NOTE — ASSESSMENT & PLAN NOTE
Patient had episode of swelling in the neck region after swelling a that the mouse currently he is asymptomatic he did not have any difficulty with swallowing no difficulty with reading a difficulty with tongue swelling no rash, no hives  He does not want a EpiPen will check him for mouth urine and serum allergy, I have explained to him he should wear a mask when disposing of mice  If he has any future symptoms he is to notify me immediately, if he develops any symptoms of anaphylaxis, throat swelling, shortness of breath, tongue swelling he is to go immediately to the ER he did not want to carry an EpiPen

## 2019-07-27 LAB — HCV AB SER QL: NORMAL

## 2019-07-29 LAB — MOUSE URINE PROT IGE QN: <0.1 KUA/I

## 2019-07-30 LAB — MOUSE SERUM PROTEINS IGE AB [UNITS/VOLUME] IN SERUM: <0.1 KU/L

## 2019-08-19 DIAGNOSIS — E03.9 HYPOTHYROIDISM, UNSPECIFIED TYPE: ICD-10-CM

## 2019-08-19 RX ORDER — LEVOTHYROXINE SODIUM 0.05 MG/1
TABLET ORAL
Qty: 90 TABLET | Refills: 1 | Status: SHIPPED | OUTPATIENT
Start: 2019-08-19 | End: 2020-02-11

## 2019-08-20 DIAGNOSIS — E11.8 TYPE 2 DIABETES MELLITUS WITH COMPLICATION, UNSPECIFIED WHETHER LONG TERM INSULIN USE: ICD-10-CM

## 2019-08-20 RX ORDER — INSULIN GLARGINE 100 [IU]/ML
INJECTION, SOLUTION SUBCUTANEOUS
Qty: 1 PEN | Refills: 1 | Status: SHIPPED | OUTPATIENT
Start: 2019-08-20 | End: 2020-03-31 | Stop reason: SDUPTHER

## 2019-09-07 DIAGNOSIS — N52.9 ERECTILE DYSFUNCTION, UNSPECIFIED ERECTILE DYSFUNCTION TYPE: ICD-10-CM

## 2019-09-07 RX ORDER — SILDENAFIL CITRATE 20 MG/1
TABLET ORAL
Qty: 30 TABLET | Refills: 3 | Status: SHIPPED | OUTPATIENT
Start: 2019-09-07 | End: 2020-04-24 | Stop reason: SDUPTHER

## 2019-09-11 DIAGNOSIS — I10 ESSENTIAL HYPERTENSION: ICD-10-CM

## 2019-09-11 RX ORDER — DILTIAZEM HYDROCHLORIDE 240 MG/1
CAPSULE, COATED, EXTENDED RELEASE ORAL
Qty: 90 CAPSULE | Refills: 3 | Status: SHIPPED | OUTPATIENT
Start: 2019-09-11 | End: 2020-04-29 | Stop reason: SDUPTHER

## 2019-10-18 ENCOUNTER — OFFICE VISIT (OUTPATIENT)
Dept: CARDIOLOGY CLINIC | Facility: CLINIC | Age: 69
End: 2019-10-18
Payer: MEDICARE

## 2019-10-18 VITALS
DIASTOLIC BLOOD PRESSURE: 60 MMHG | SYSTOLIC BLOOD PRESSURE: 122 MMHG | WEIGHT: 118.6 LBS | HEART RATE: 83 BPM | HEIGHT: 66 IN | OXYGEN SATURATION: 94 % | BODY MASS INDEX: 19.06 KG/M2

## 2019-10-18 DIAGNOSIS — I34.0 MITRAL VALVE INSUFFICIENCY, UNSPECIFIED ETIOLOGY: Primary | ICD-10-CM

## 2019-10-18 DIAGNOSIS — E78.5 DYSLIPIDEMIA: ICD-10-CM

## 2019-10-18 DIAGNOSIS — I10 ESSENTIAL HYPERTENSION: ICD-10-CM

## 2019-10-18 DIAGNOSIS — I25.10 CORONARY ARTERY DISEASE INVOLVING NATIVE CORONARY ARTERY OF NATIVE HEART WITHOUT ANGINA PECTORIS: ICD-10-CM

## 2019-10-18 PROCEDURE — 99214 OFFICE O/P EST MOD 30 MIN: CPT | Performed by: INTERNAL MEDICINE

## 2019-10-18 NOTE — PROGRESS NOTES
Cardiology   Jose Matthews 77 y o  male MRN: [id number]      Impression:  1  Coronary artery disease s/p CABG - stable  2  Hypertension - good control  3  Mild to moderate mitral regurgitation - stable  Actually mitral regurgitation is improved from last time  4  Dyslipidemia - on statin           Recommendations:  1  Continue current medications  2  Follow up in 6 months  HPI: Jose Matthews is a 77y o  year old male with coronary artery disease, hypertension, and dyslipidemia presents for follow up  Asymptomatic from a cardiac standpoint  No chest pain, shortness of breath, or palpitations  No chest pain, shortness of breath, or palpitations  Echocardiogram 9/28/17 demonstrated normal left ventricular systolic function and mild mitral regurgitation - no change  Review of Systems   Constitutional: Negative  HENT: Negative  Eyes: Negative  Respiratory: Negative for chest tightness and shortness of breath  Cardiovascular: Negative for chest pain, palpitations and leg swelling  Gastrointestinal: Negative  Endocrine: Negative  Genitourinary: Negative  Musculoskeletal: Negative  Skin: Negative  Allergic/Immunologic: Negative  Neurological: Positive for weakness  Hematological: Negative  Psychiatric/Behavioral: Negative  All other systems reviewed and are negative          Past Medical History:   Diagnosis Date    3-vessel CAD     CAP (community acquired pneumonia)     Last assessed - 3/15/16    Cardiac murmur     Disease of thyroid gland     History of poliomyelitis     Hyperlipidemia     Hypertension     Methicillin resistant Staphylococcus aureus infection     Mitral valve insufficiency     Nonspecific abnormal results of function study of thyroid     Last assessed - 10/15/14     Past Surgical History:   Procedure Laterality Date    CARDIAC SURGERY  1997    CORONARY ARTERY BYPASS GRAFT      x3; Last assessed - 9/29/17    RI COLONOSCOPY FLX DX W/COLLJ David Zamora [facility] 1263 WHEN PFRMD N/A 3/27/2019    Procedure: COLONOSCOPY;  Surgeon: Michael Nolasco MD;  Location: [facility]; Service: Gastroenterology     Social History     Substance and Sexual Activity   Alcohol Use No     Social History     Substance and Sexual Activity   Drug Use No     Social History     Tobacco Use   Smoking Status Former Smoker    Packs/day: 0 50    Years: 30 00    Pack years: 15 00    Types: Cigarettes    Last attempt to quit: Yahir Sydneymadison Years since quittin 8   Smokeless Tobacco Never Used   Tobacco Comment         Family History   Adopted: Yes   Problem Relation Age of Onset    Diabetes Family     No Known Problems Mother        Allergies:   Allergies   Allergen Reactions    Atorvastatin      Other reaction(s): Itching    Levofloxacin      Other reaction(s): GI Upset    Pravastatin      Other reaction(s): Itching       Medications:     Current Outpatient Medications:     aspirin (ECOTRIN) 325 mg EC tablet, Take 1 tablet by mouth daily, Disp: , Rfl:     BASAGLAR KWIKPEN 100 units/mL injection pen, INJECT 20 UNITS UNDER THE SKIN DAILY, Disp: 1 pen, Rfl: 1    diltiazem (CARDIZEM CD) 240 mg 24 hr capsule, TAKE ONE CAPSULE BY MOUTH EVERY DAY, Disp: 90 capsule, Rfl: 3    fexofenadine (ALLEGRA) 180 MG tablet, TAKE 1 TABLET BY MOUTH EVERY DAY AS NEEDED FOR ALLERGIES, Disp: 10 tablet, Rfl: 0    glucose blood (AIMEE CONTOUR TEST) test strip, by In Vitro route daily, Disp: , Rfl:     hydrocortisone (ANUSOL-HC, PROCTOSOL HC,) 2 5 % rectal cream, Insert into the rectum 2 (two) times a day, Disp: 30 g, Rfl: 5    insulin glargine (BASAGLAR KWIKPEN) 100 units/mL injection pen, Inject 20 Units under the skin daily, Disp: 5 pen, Rfl: 2    Insulin Pen Needle (B-D UF III MINI PEN NEEDLES) 31G X 5 MM MISC, by Does not apply route, Disp: , Rfl:     levothyroxine 50 mcg tablet, TAKE 1 TABLET BY MOUTH DAILY, Disp: 90 tablet, Rfl: 1    losartan (COZAAR) 50 mg tablet, TAKE 1 TABLET BY MOUTH EVERY DAY, Disp: 90 tablet, Rfl: 3    metFORMIN (GLUCOPHAGE-XR) 500 mg 24 hr tablet, TAKE 1 TABLET BY MOUTH TWICE A DAY, Disp: 180 tablet, Rfl: 3    Omega-3 Fatty Acids (FISH OIL) 1,000 mg, Take 1 capsule by mouth daily, Disp: , Rfl:     sildenafil (REVATIO) 20 mg tablet, TAKE 1 TABLET BY MOUTH EVERY DAY AS NEEDED, Disp: 30 tablet, Rfl: 3    simvastatin (ZOCOR) 40 mg tablet, Take 1 tablet by mouth, Disp: , Rfl:     VITAMIN B COMPLEX-C PO, Take 1 tablet by mouth daily, Disp: , Rfl:     diazepam (VALIUM) 5 mg tablet, Take 1 tablet (5 mg total) by mouth every 8 (eight) hours as needed for anxiety (anxiety) (Patient not taking: Reported on 10/18/2019), Disp: 90 tablet, Rfl: 1    hydrocortisone (ANUSOL-HC) 25 mg suppository, Insert 1 suppository (25 mg total) into the rectum 2 (two) times a day as needed for hemorrhoids (Patient not taking: Reported on 7/25/2019), Disp: 12 suppository, Rfl: 1      Wt Readings from Last 3 Encounters:   10/18/19 53 8 kg (118 lb 9 6 oz)   07/25/19 51 8 kg (114 lb 3 2 oz)   06/19/19 51 7 kg (114 lb)     Temp Readings from Last 3 Encounters:   07/25/19 98 3 °F (36 8 °C) (Oral)   06/04/19 97 6 °F (36 4 °C)   06/04/19 98 3 °F (36 8 °C) (Tympanic)     BP Readings from Last 3 Encounters:   10/18/19 122/60   07/25/19 132/62   06/19/19 131/63     Pulse Readings from Last 3 Encounters:   10/18/19 83   07/25/19 74   06/19/19 87         Physical Exam   Constitutional: He is oriented to person, place, and time  He appears well-developed  HENT:   Head: Atraumatic  Eyes: EOM are normal    Neck: Normal range of motion  Cardiovascular: Normal rate, regular rhythm and normal heart sounds  Exam reveals no gallop and no friction rub  No murmur heard  Pulmonary/Chest: Effort normal and breath sounds normal    Abdominal: Soft  Musculoskeletal: Normal range of motion  Neurological: He is alert and oriented to person, place, and time  Skin: Skin is warm and dry     Psychiatric: He has a normal mood and affect  Laboratory Studies:  CMP:  Lab Results   Component Value Date     (L) 2014    K 4 2 2019     2019    CO2 27 2019    ANIONGAP 7 2014    BUN 17 2019    CREATININE 0 82 2019    GLUCOSE 112 2014    AST 25 2019    ALT 27 2019    BILITOT 0 3 2014    EGFR 90 2019       Lipid Profile:   No results found for: CHOL  Lab Results   Component Value Date    HDL 53 2019     Lab Results   Component Value Date    LDLCALC 72 2019     Lab Results   Component Value Date    TRIG 66 2019       Cardiac testing:     Results for orders placed during the hospital encounter of 17   Echo complete with contrast if indicated    Narrative [place] [id number], [address] [place] [address]  [phone]    Transthoracic Echocardiogram  2D, M-mode, Doppler, and Color Doppler    Study date:  28-Sep-2017    Patient: Adina Spence  MR number: [id number]  Account number: [de-identified]  : 1950  Age: 74 years  Gender: Male  Status: Outpatient  Location: [address] [place] [address] [facility]  Height: 64 in  Weight: 112 9 lb  BP: 120/ 70 mmHg    Indications: Cardiac murmur    Diagnoses: R01 1 - Cardiac murmur, unspecified    Sonographer:  ADELAIDA Schmid, RDCS  Referring Physician:  Elisabeth Crenshaw DO  Group:  [facility]  Interpreting Physician:  Marifer Pryor MD    SUMMARY    LEFT VENTRICLE:  Systolic function was normal  Ejection fraction was estimated to be 60 %  There were no regional wall motion abnormalities  RIGHT VENTRICLE:  The size was normal   Systolic function was mildly reduced  MITRAL VALVE:  There was mild regurgitation  TRICUSPID VALVE:  There was mild regurgitation  Pulmonary artery systolic pressure was within the normal range  PULMONIC VALVE:  There was trace regurgitation  HISTORY: PRIOR HISTORY: CAD; CABG; Hyperlipidemia; Hypertension;  Hypothyroid; Diabetes Mellitus; Post polio syndrome    PROCEDURE: The study was performed in the [facility]  This was a routine study  The transthoracic approach was used  The study included complete 2D imaging, M-mode, complete spectral Doppler, and color Doppler  The  heart rate was 65 bpm, at the start of the study  Images were obtained from the parasternal, apical, subcostal, and suprasternal notch acoustic windows  Image quality was adequate  LEFT VENTRICLE: Size was normal  Systolic function was normal  Ejection fraction was estimated to be 60 %  There were no regional wall motion abnormalities  Wall thickness was normal  DOPPLER: Left ventricular diastolic function parameters  were normal     VENTRICULAR SEPTUM: There was normal motion  There was normal contour  RIGHT VENTRICLE: The size was normal  Systolic function was mildly reduced  Wall thickness was normal     LEFT ATRIUM: Size was normal     RIGHT ATRIUM: Size was normal     MITRAL VALVE: There was mild thickening of the valve  There was normal leaflet separation  DOPPLER: The transmitral velocity was within the normal range  There was no evidence for stenosis  There was mild regurgitation  AORTIC VALVE: The valve was trileaflet  Leaflets exhibited mildly increased thickness and lower normal cuspal separation  DOPPLER: Transaortic velocity was within the normal range  There was no evidence for stenosis  There was no  regurgitation  TRICUSPID VALVE: The valve structure was normal  There was normal leaflet separation  DOPPLER: The transtricuspid velocity was within the normal range  There was no evidence for stenosis  There was mild regurgitation  Pulmonary artery  systolic pressure was within the normal range  PULMONIC VALVE: Leaflets exhibited normal thickness, no calcification, and normal cuspal separation  DOPPLER: The transpulmonic velocity was within the normal range  There was trace regurgitation      PERICARDIUM: There was no pericardial effusion  AORTA: The root exhibited normal size      SYSTEM MEASUREMENT TABLES    2D  %FS: 33 82 %  AV Diam: 3 01 cm  EDV(Teich): 24 47 ml  EF(Cube): 71 01 %  EF(Teich): 64 83 %  ESV(Cube): 5 06 ml  ESV(Teich): 8 61 ml  IVSd: 1 38 cm  LA Area: 16 89 cm2  LA Diam: 4 28 cm  LVEDV MOD A4C: 62 16 ml  LVEF MOD A4C: 61 5 %  LVESV MOD A4C: 23 93 ml  LVIDd: 2 59 cm  LVIDs: 1 72 cm  LVLd A4C: 7 32 cm  LVLs A4C: 5 72 cm  LVPWd: 1 08 cm  RA Area: 9 22 cm2  RV Diam: 3 26 cm  SI(Cube): 8 05 ml/m2  SI(Teich): 10 3 ml/m2  SV MOD A4C: 38 23 ml  SV(Cube): 12 4 ml  SV(Teich): 15 87 ml    CW  TR MaxP 57 mmHg  TR Vmax: 2 14 m/s    MM  TAPSE: 1 4 cm    PW  E': 0 05 m/s  E/E': 25 04  MV A [place]: 1 34 m/s  MV Dec [place]: 5 38 m/s2  MV DecT: 251 7 ms  MV E [place]: 1 35 m/s  MV E/A Ratio: 1 01    [employer] Accredited Echocardiography Laboratory    Prepared and electronically signed by    Frannie Perez MD  Signed [id number] 17:41:27

## 2019-11-24 DIAGNOSIS — I10 HTN (HYPERTENSION): ICD-10-CM

## 2019-11-25 RX ORDER — LOSARTAN POTASSIUM 50 MG/1
TABLET ORAL
Qty: 90 TABLET | Refills: 3 | Status: SHIPPED | OUTPATIENT
Start: 2019-11-25 | End: 2020-11-10

## 2019-11-28 DIAGNOSIS — E11.9 TYPE 2 DIABETES MELLITUS WITHOUT COMPLICATION, WITHOUT LONG-TERM CURRENT USE OF INSULIN (HCC): ICD-10-CM

## 2019-11-30 RX ORDER — METFORMIN HYDROCHLORIDE 500 MG/1
TABLET, EXTENDED RELEASE ORAL
Qty: 180 TABLET | Refills: 3 | Status: SHIPPED | OUTPATIENT
Start: 2019-11-30 | End: 2020-11-15

## 2020-02-11 DIAGNOSIS — E03.9 HYPOTHYROIDISM, UNSPECIFIED TYPE: ICD-10-CM

## 2020-02-11 RX ORDER — LEVOTHYROXINE SODIUM 0.05 MG/1
TABLET ORAL
Qty: 90 TABLET | Refills: 1 | Status: SHIPPED | OUTPATIENT
Start: 2020-02-11 | End: 2020-07-29

## 2020-03-14 ENCOUNTER — TELEPHONE (OUTPATIENT)
Dept: GASTROENTEROLOGY | Facility: AMBULARY SURGERY CENTER | Age: 70
End: 2020-03-14

## 2020-03-14 NOTE — TELEPHONE ENCOUNTER
Letter sent to patient to call in and schedule repeat Colonoscopy with Dr Varela May  Patient due March

## 2020-03-18 ENCOUNTER — TELEMEDICINE (OUTPATIENT)
Dept: PULMONOLOGY | Facility: CLINIC | Age: 70
End: 2020-03-18

## 2020-03-18 ENCOUNTER — DOCUMENTATION (OUTPATIENT)
Dept: PULMONOLOGY | Facility: CLINIC | Age: 70
End: 2020-03-18

## 2020-03-18 DIAGNOSIS — J47.9 BRONCHIECTASIS WITHOUT COMPLICATION (HCC): Primary | ICD-10-CM

## 2020-03-18 DIAGNOSIS — M89.669: ICD-10-CM

## 2020-03-18 DIAGNOSIS — B91: ICD-10-CM

## 2020-03-18 PROCEDURE — G2012 BRIEF CHECK IN BY MD/QHP: HCPCS | Performed by: INTERNAL MEDICINE

## 2020-03-18 RX ORDER — FLUTICASONE FUROATE AND VILANTEROL 100; 25 UG/1; UG/1
1 POWDER RESPIRATORY (INHALATION) DAILY
Qty: 3 INHALER | Refills: 4 | Status: SHIPPED | OUTPATIENT
Start: 2020-03-18 | End: 2021-02-11 | Stop reason: SDUPTHER

## 2020-03-18 RX ORDER — ALBUTEROL SULFATE 1.25 MG/3ML
1.25 SOLUTION RESPIRATORY (INHALATION) EVERY 6 HOURS PRN
Status: SHIPPED | OUTPATIENT
Start: 2020-03-18

## 2020-03-30 ENCOUNTER — TELEPHONE (OUTPATIENT)
Dept: GASTROENTEROLOGY | Facility: AMBULARY SURGERY CENTER | Age: 70
End: 2020-03-30

## 2020-03-30 DIAGNOSIS — K64.8 INTERNAL HEMORRHOID: ICD-10-CM

## 2020-03-31 DIAGNOSIS — E11.8 TYPE 2 DIABETES MELLITUS WITH COMPLICATION (HCC): ICD-10-CM

## 2020-04-06 ENCOUNTER — TELEMEDICINE (OUTPATIENT)
Dept: INTERNAL MEDICINE CLINIC | Facility: CLINIC | Age: 70
End: 2020-04-06
Payer: MEDICARE

## 2020-04-06 VITALS
HEART RATE: 72 BPM | BODY MASS INDEX: 18.08 KG/M2 | WEIGHT: 112 LBS | DIASTOLIC BLOOD PRESSURE: 56 MMHG | SYSTOLIC BLOOD PRESSURE: 125 MMHG

## 2020-04-06 DIAGNOSIS — E11.8 TYPE 2 DIABETES MELLITUS WITH COMPLICATION (HCC): ICD-10-CM

## 2020-04-06 DIAGNOSIS — J30.2 SEASONAL ALLERGIES: ICD-10-CM

## 2020-04-06 DIAGNOSIS — Z00.00 MEDICARE ANNUAL WELLNESS VISIT, SUBSEQUENT: Primary | ICD-10-CM

## 2020-04-06 PROCEDURE — 99213 OFFICE O/P EST LOW 20 MIN: CPT | Performed by: INTERNAL MEDICINE

## 2020-04-06 PROCEDURE — 3074F SYST BP LT 130 MM HG: CPT | Performed by: INTERNAL MEDICINE

## 2020-04-06 PROCEDURE — 3078F DIAST BP <80 MM HG: CPT | Performed by: INTERNAL MEDICINE

## 2020-04-06 PROCEDURE — G0438 PPPS, INITIAL VISIT: HCPCS | Performed by: INTERNAL MEDICINE

## 2020-04-06 PROCEDURE — 1170F FXNL STATUS ASSESSED: CPT | Performed by: INTERNAL MEDICINE

## 2020-04-06 PROCEDURE — 1036F TOBACCO NON-USER: CPT | Performed by: INTERNAL MEDICINE

## 2020-04-06 PROCEDURE — 1125F AMNT PAIN NOTED PAIN PRSNT: CPT | Performed by: INTERNAL MEDICINE

## 2020-04-06 PROCEDURE — 1123F ACP DISCUSS/DSCN MKR DOCD: CPT | Performed by: INTERNAL MEDICINE

## 2020-04-06 RX ORDER — DESLORATADINE 5 MG/1
5 TABLET ORAL DAILY PRN
Qty: 30 TABLET | Refills: 2 | Status: SHIPPED | OUTPATIENT
Start: 2020-04-06 | End: 2020-10-16 | Stop reason: CLARIF

## 2020-04-06 RX ORDER — BENZONATATE 100 MG/1
100 CAPSULE ORAL
Qty: 20 CAPSULE | Refills: 0 | Status: SHIPPED | OUTPATIENT
Start: 2020-04-06 | End: 2020-05-19 | Stop reason: SDUPTHER

## 2020-04-20 DIAGNOSIS — E11.8 CONTROLLED DIABETES MELLITUS TYPE 2 WITH COMPLICATIONS, UNSPECIFIED WHETHER LONG TERM INSULIN USE (HCC): Primary | ICD-10-CM

## 2020-04-24 DIAGNOSIS — N52.9 ERECTILE DYSFUNCTION, UNSPECIFIED ERECTILE DYSFUNCTION TYPE: ICD-10-CM

## 2020-04-24 RX ORDER — SILDENAFIL CITRATE 20 MG/1
TABLET ORAL
Qty: 30 TABLET | Refills: 3 | Status: SHIPPED | OUTPATIENT
Start: 2020-04-24 | End: 2020-10-14

## 2020-04-29 ENCOUNTER — TELEMEDICINE (OUTPATIENT)
Dept: CARDIOLOGY CLINIC | Facility: CLINIC | Age: 70
End: 2020-04-29
Payer: MEDICARE

## 2020-04-29 VITALS — SYSTOLIC BLOOD PRESSURE: 107 MMHG | HEART RATE: 72 BPM | DIASTOLIC BLOOD PRESSURE: 68 MMHG

## 2020-04-29 DIAGNOSIS — I25.10 CORONARY ARTERY DISEASE INVOLVING NATIVE CORONARY ARTERY OF NATIVE HEART WITHOUT ANGINA PECTORIS: Primary | ICD-10-CM

## 2020-04-29 DIAGNOSIS — E78.5 DYSLIPIDEMIA: ICD-10-CM

## 2020-04-29 DIAGNOSIS — I34.0 MITRAL VALVE INSUFFICIENCY, UNSPECIFIED ETIOLOGY: ICD-10-CM

## 2020-04-29 DIAGNOSIS — I10 ESSENTIAL HYPERTENSION: ICD-10-CM

## 2020-04-29 DIAGNOSIS — Z95.1 S/P CABG (CORONARY ARTERY BYPASS GRAFT): ICD-10-CM

## 2020-04-29 PROCEDURE — 99214 OFFICE O/P EST MOD 30 MIN: CPT | Performed by: INTERNAL MEDICINE

## 2020-04-29 RX ORDER — DILTIAZEM HYDROCHLORIDE 240 MG/1
240 CAPSULE, COATED, EXTENDED RELEASE ORAL DAILY
Qty: 90 CAPSULE | Refills: 0 | Status: SHIPPED | OUTPATIENT
Start: 2020-04-29 | End: 2020-08-20

## 2020-05-18 ENCOUNTER — HOSPITAL ENCOUNTER (OUTPATIENT)
Dept: CT IMAGING | Facility: HOSPITAL | Age: 70
Discharge: HOME | End: 2020-05-18
Attending: INTERNAL MEDICINE
Payer: MEDICARE

## 2020-05-18 DIAGNOSIS — J47.9 BRONCHIECTASIS WITHOUT COMPLICATION (HCC): ICD-10-CM

## 2020-05-18 PROCEDURE — 71250 CT THORAX DX C-: CPT

## 2020-05-19 DIAGNOSIS — J30.2 SEASONAL ALLERGIES: ICD-10-CM

## 2020-05-19 RX ORDER — BENZONATATE 100 MG/1
100 CAPSULE ORAL
Qty: 20 CAPSULE | Refills: 0 | Status: SHIPPED | OUTPATIENT
Start: 2020-05-19 | End: 2020-06-10 | Stop reason: SDUPTHER

## 2020-06-05 ENCOUNTER — TELEPHONE (OUTPATIENT)
Dept: CARDIOLOGY CLINIC | Facility: CLINIC | Age: 70
End: 2020-06-05

## 2020-06-05 ENCOUNTER — ALLERGY (OUTPATIENT)
Dept: CARDIOLOGY CLINIC | Facility: CLINIC | Age: 70
End: 2020-06-05

## 2020-06-05 DIAGNOSIS — E78.5 DYSLIPIDEMIA: Primary | ICD-10-CM

## 2020-06-05 RX ORDER — EZETIMIBE 10 MG/1
10 TABLET ORAL DAILY
COMMUNITY
End: 2020-06-05 | Stop reason: SDUPTHER

## 2020-06-05 RX ORDER — EZETIMIBE 10 MG/1
10 TABLET ORAL DAILY
Qty: 30 TABLET | Refills: 11 | Status: SHIPPED | OUTPATIENT
Start: 2020-06-05 | End: 2021-04-13

## 2020-06-10 DIAGNOSIS — J30.2 SEASONAL ALLERGIES: ICD-10-CM

## 2020-06-10 RX ORDER — BENZONATATE 100 MG/1
100 CAPSULE ORAL
Qty: 20 CAPSULE | Refills: 0 | Status: SHIPPED | OUTPATIENT
Start: 2020-06-10 | End: 2020-07-04 | Stop reason: SDUPTHER

## 2020-07-04 DIAGNOSIS — J30.2 SEASONAL ALLERGIES: ICD-10-CM

## 2020-07-04 RX ORDER — BENZONATATE 100 MG/1
100 CAPSULE ORAL
Qty: 20 CAPSULE | Refills: 0 | Status: SHIPPED | OUTPATIENT
Start: 2020-07-04 | End: 2020-07-29 | Stop reason: SDUPTHER

## 2020-07-08 ENCOUNTER — APPOINTMENT (OUTPATIENT)
Dept: LAB | Facility: CLINIC | Age: 70
End: 2020-07-08
Payer: MEDICARE

## 2020-07-08 ENCOUNTER — TRANSCRIBE ORDERS (OUTPATIENT)
Dept: LAB | Facility: CLINIC | Age: 70
End: 2020-07-08

## 2020-07-08 DIAGNOSIS — E11.8 TYPE 2 DIABETES MELLITUS WITH COMPLICATION (HCC): ICD-10-CM

## 2020-07-08 DIAGNOSIS — E78.5 DYSLIPIDEMIA: ICD-10-CM

## 2020-07-08 LAB
ALBUMIN SERPL BCP-MCNC: 3.6 G/DL (ref 3.5–5)
ALP SERPL-CCNC: 78 U/L (ref 46–116)
ALT SERPL W P-5'-P-CCNC: 29 U/L (ref 12–78)
ANION GAP SERPL CALCULATED.3IONS-SCNC: 7 MMOL/L (ref 4–13)
AST SERPL W P-5'-P-CCNC: 21 U/L (ref 5–45)
BILIRUB SERPL-MCNC: 0.59 MG/DL (ref 0.2–1)
BUN SERPL-MCNC: 15 MG/DL (ref 5–25)
CALCIUM SERPL-MCNC: 9.3 MG/DL (ref 8.3–10.1)
CHLORIDE SERPL-SCNC: 103 MMOL/L (ref 100–108)
CHOLEST SERPL-MCNC: 192 MG/DL (ref 50–200)
CO2 SERPL-SCNC: 28 MMOL/L (ref 21–32)
CREAT SERPL-MCNC: 0.87 MG/DL (ref 0.6–1.3)
CREAT UR-MCNC: 176 MG/DL
EST. AVERAGE GLUCOSE BLD GHB EST-MCNC: 137 MG/DL
GFR SERPL CREATININE-BSD FRML MDRD: 87 ML/MIN/1.73SQ M
GLUCOSE P FAST SERPL-MCNC: 97 MG/DL (ref 65–99)
HBA1C MFR BLD: 6.4 %
HDLC SERPL-MCNC: 60 MG/DL
LDLC SERPL CALC-MCNC: 121 MG/DL (ref 0–100)
MICROALBUMIN UR-MCNC: 456 MG/L (ref 0–20)
MICROALBUMIN/CREAT 24H UR: 259 MG/G CREATININE (ref 0–30)
POTASSIUM SERPL-SCNC: 4.4 MMOL/L (ref 3.5–5.3)
PROT SERPL-MCNC: 7.9 G/DL (ref 6.4–8.2)
SODIUM SERPL-SCNC: 138 MMOL/L (ref 136–145)
TRIGL SERPL-MCNC: 54 MG/DL

## 2020-07-08 PROCEDURE — 80061 LIPID PANEL: CPT

## 2020-07-08 PROCEDURE — 82570 ASSAY OF URINE CREATININE: CPT

## 2020-07-08 PROCEDURE — 82043 UR ALBUMIN QUANTITATIVE: CPT

## 2020-07-08 PROCEDURE — 83036 HEMOGLOBIN GLYCOSYLATED A1C: CPT

## 2020-07-08 PROCEDURE — 36415 COLL VENOUS BLD VENIPUNCTURE: CPT | Performed by: INTERNAL MEDICINE

## 2020-07-08 PROCEDURE — 80053 COMPREHEN METABOLIC PANEL: CPT | Performed by: INTERNAL MEDICINE

## 2020-07-13 DIAGNOSIS — R89.9 ABNORMAL LABORATORY TEST: Primary | ICD-10-CM

## 2020-07-29 DIAGNOSIS — J30.2 SEASONAL ALLERGIES: ICD-10-CM

## 2020-07-29 DIAGNOSIS — E03.9 HYPOTHYROIDISM, UNSPECIFIED TYPE: ICD-10-CM

## 2020-07-29 RX ORDER — BENZONATATE 100 MG/1
100 CAPSULE ORAL
Qty: 20 CAPSULE | Refills: 0 | Status: SHIPPED | OUTPATIENT
Start: 2020-07-29 | End: 2020-08-25 | Stop reason: SDUPTHER

## 2020-07-29 RX ORDER — LEVOTHYROXINE SODIUM 0.05 MG/1
TABLET ORAL
Qty: 90 TABLET | Refills: 1 | Status: SHIPPED | OUTPATIENT
Start: 2020-07-29 | End: 2021-01-18

## 2020-08-20 DIAGNOSIS — I10 ESSENTIAL HYPERTENSION: ICD-10-CM

## 2020-08-20 RX ORDER — DILTIAZEM HYDROCHLORIDE 240 MG/1
CAPSULE, COATED, EXTENDED RELEASE ORAL
Qty: 90 CAPSULE | Refills: 3 | Status: SHIPPED | OUTPATIENT
Start: 2020-08-20 | End: 2021-05-20

## 2020-08-22 DIAGNOSIS — E11.8 TYPE 2 DIABETES MELLITUS WITH COMPLICATION (HCC): ICD-10-CM

## 2020-08-23 RX ORDER — INSULIN GLARGINE 100 [IU]/ML
INJECTION, SOLUTION SUBCUTANEOUS
Qty: 5 PEN | Refills: 1 | Status: SHIPPED | OUTPATIENT
Start: 2020-08-23 | End: 2020-12-09

## 2020-08-25 DIAGNOSIS — J30.2 SEASONAL ALLERGIES: ICD-10-CM

## 2020-08-25 RX ORDER — BENZONATATE 100 MG/1
100 CAPSULE ORAL
Qty: 20 CAPSULE | Refills: 0 | Status: SHIPPED | OUTPATIENT
Start: 2020-08-25 | End: 2020-09-21 | Stop reason: SDUPTHER

## 2020-08-25 NOTE — PROGRESS NOTES
Pulmonary Follow Up Note   Jade Prater 68 y o  male MRN: [id number]  8/26/2020      Assessment:  1  Abnormal CT Chest  · Noted worsened GGOs and new interstitial changes  · DDx includes IPAF, NSIP, vasculitis & HP - less likely IPF, sarcoid, malignancy   · Minimal reduction in FVC with moderate restrictive defect  · Will plan on further extensive serologic evaluation, repeat HRCT chest in 4-6 weeks, complete PFTs, and VBS to evaluate for occult aspiration  · Follow up in 6-8 weeks  · He declines all vaccines at this time, will address again in future     2  Bronchiectasis - as listed above     3  Mitral regurgitation - no evidence of volume overload/CHF    Plan:    Diagnoses and all orders for this visit:    Abnormal chest CT  -     POCT spirometry  -     POCT 6 minute walk  -     JORJE Screen w/ Reflex to Titer/Pattern; Future  -     Anti-neutrophilic cytoplasmic antibody; Future  -     Anti-scleroderma antibody; Future  -     Hypersensitivity pnuemonitis profile; Future  -     RF Screen w/ Reflex to Titer; Future  -     Anti-DNA antibody, double-stranded; Future  -     CK; Future  -     C-reactive protein; Future  -     Glomerular basement membrane antibodies; Future  -     Aldolase; Future  -     IgG 1, 2, 3, and 4; Future  -     IgG, IgA, IgM; Future  -     IgE; Future  -     [facility] Panel, Adult; Future  -     Sjogren's Antibodies; Future  -     RNP Antibody; Future  -     FL barium swallow video w speech; Future  -     SARS-CoV2 Antibody, Total (IgG, IgA, IgM) [facility]; Future  -     Complete PFT with post bronchodilator; Future  -     CT chest high resolution; Future    Bronchiectasis without complication (HCC)  -     POCT spirometry  -     POCT 6 minute walk  -     JORJE Screen w/ Reflex to Titer/Pattern; Future  -     Anti-neutrophilic cytoplasmic antibody; Future  -     Anti-scleroderma antibody; Future  -     Hypersensitivity pnuemonitis profile; Future  -     RF Screen w/ Reflex to Titer;  Future  - Anti-DNA antibody, double-stranded; Future  -     CK; Future  -     C-reactive protein; Future  -     Glomerular basement membrane antibodies; Future  -     Aldolase; Future  -     IgG 1, 2, 3, and 4; Future  -     IgG, IgA, IgM; Future  -     IgE; Future  -     [facility] Allergy Panel, Adult; Future  -     Sjogren's Antibodies; Future  -     RNP Antibody; Future  -     FL barium swallow video w speech; Future  -     SARS-CoV2 Antibody, Total (IgG, IgA, IgM) [facility]; Future  -     Complete PFT with post bronchodilator; Future  -     CT chest high resolution; Future    Interstitial lung disease (HCC)  -     JORJE Screen w/ Reflex to Titer/Pattern; Future  -     Anti-neutrophilic cytoplasmic antibody; Future  -     Anti-scleroderma antibody; Future  -     Hypersensitivity pnuemonitis profile; Future  -     RF Screen w/ Reflex to Titer; Future  -     Anti-DNA antibody, double-stranded; Future  -     CK; Future  -     C-reactive protein; Future  -     Glomerular basement membrane antibodies; Future  -     Aldolase; Future  -     IgG 1, 2, 3, and 4; Future  -     IgG, IgA, IgM; Future  -     IgE; Future  -     [facility] Allergy Panel, Adult; Future  -     Sjogren's Antibodies; Future  -     RNP Antibody; Future  -     FL barium swallow video w speech; Future  -     SARS-CoV2 Antibody, Total (IgG, IgA, IgM) [facility]; Future  -     Complete PFT with post bronchodilator; Future  -     CT chest high resolution; Future  -     CBC and differential; Future        Return in about 6 weeks (around 10/7/2020) for Recheck  History of Present Illness   HPI:  Joby Causey is a 68 y o  male who has CAD post CABG, DM2, Prior Polio syndrome, with abnormal CT chest with bronchiectasis and left sided GGOs  He was initially seen in June 2016 and has been followed with serial radiographs  He was last seen by me in April 2019 and Dr Sarah Daniel in March 2020 for televist   At that time he was noted to have increased cough and dyspnea    He was advised to restart Breo, nebs, and obtain repeat CT Chest  He presents today for follow up      He states he overall feels well  He denied added dyspnea and continues to perform his ADLs and EADLs independently  He reports mild cough with clear/white sputum, he denied hemoptysis  He denied pleurisy, no new joint pains or arthralgias, no oral lesions  He denied dysphagia or gross aspiration events  He reports stable weight, no LE edema, no chest pains  He denied changes to home environment and no animal exposures  Additional Social history  - remote smoker quit [address] [place] [address], immigrated after war, [employer] - retired  - Negative PPD in past     Review of Systems   Constitutional: Negative for activity change, chills, fatigue, fever and unexpected weight change  HENT: Negative for congestion, mouth sores, postnasal drip, rhinorrhea, sore throat, trouble swallowing and voice change  Eyes: Negative for pain, redness and visual disturbance  Respiratory: Positive for cough  Negative for chest tightness, shortness of breath and wheezing  Cardiovascular: Negative for chest pain, palpitations and leg swelling  Gastrointestinal: Negative for abdominal distention, abdominal pain, diarrhea, nausea and vomiting  Endocrine: Negative for cold intolerance and heat intolerance  Genitourinary: Negative for dysuria, frequency and urgency  Musculoskeletal: Positive for gait problem  Negative for arthralgias and myalgias  Skin: Negative for color change, rash and wound  Allergic/Immunologic: Negative for immunocompromised state  Neurological: Negative for dizziness, syncope, weakness and light-headedness  Hematological: Negative for adenopathy  Psychiatric/Behavioral: Negative for confusion and sleep disturbance  The patient is not nervous/anxious          Historical Information   Past Medical History:   Diagnosis Date    3-vessel CAD     CAP (community acquired pneumonia)     Last assessed - 3/15/16    Cardiac murmur     Disease of thyroid gland     History of poliomyelitis     Hyperlipidemia     Hypertension     Methicillin resistant Staphylococcus aureus infection     Mitral valve insufficiency     Nonspecific abnormal results of function study of thyroid     Last assessed - 10/15/14     Past Surgical History:   Procedure Laterality Date    CARDIAC SURGERY  1997    CORONARY ARTERY BYPASS GRAFT      x3; Last assessed - 9/29/17    VA COLONOSCOPY FLX DX W/COLLJ SPEC WHEN PFRMD N/A 3/27/2019    Procedure: COLONOSCOPY;  Surgeon: Elva Murrieta MD;  [facility];   Service: Gastroenterology     Family History   Adopted: Yes   Problem Relation Age of Onset    Diabetes Family     No Known Problems Mother          Meds/Allergies     Current Outpatient Medications:     aspirin (ECOTRIN) 325 mg EC tablet, Take 1 tablet by mouth daily, Disp: , Rfl:     benzonatate (TESSALON PERLES) 100 mg capsule, Take 1 capsule (100 mg total) by mouth daily at bedtime as needed for cough, Disp: 20 capsule, Rfl: 0    desloratadine (CLARINEX) 5 MG tablet, Take 1 tablet (5 mg total) by mouth daily as needed (allergies), Disp: 30 tablet, Rfl: 2    diazepam (VALIUM) 5 mg tablet, Take 1 tablet (5 mg total) by mouth every 8 (eight) hours as needed for anxiety (anxiety), Disp: 90 tablet, Rfl: 1    diltiazem (CARDIZEM CD) 240 mg 24 hr capsule, TAKE ONE CAPSULE BY MOUTH EVERY DAY, Disp: 90 capsule, Rfl: 3    ezetimibe (ZETIA) 10 mg tablet, Take 1 tablet (10 mg total) by mouth daily, Disp: 30 tablet, Rfl: 11    fluticasone-vilanterol (BREO ELLIPTA) 100-25 mcg/inh inhaler, Inhale 1 puff daily Rinse mouth after use , Disp: 3 Inhaler, Rfl: 4    glucose blood (AIMEE CONTOUR TEST) test strip, by In Vitro route daily, Disp: , Rfl:     hydrocortisone (ANUSOL-HC) 25 mg suppository, Insert 1 suppository (25 mg total) into the rectum 2 (two) times a day as needed for hemorrhoids, Disp: 12 suppository, Rfl: 1    hydrocortisone (ANUSOL-HC, PROCTOSOL HC,) 2 5 % rectal cream, Insert into the rectum 2 (two) times a day, Disp: 30 g, Rfl: 5    insulin glargine (BASAGLAR KWIKPEN) 100 units/mL injection pen, Inject 20 Units under the skin daily, Disp: 5 pen, Rfl: 2    insulin glargine (Basaglar KwikPen) 100 units/mL injection pen, INJECT 20 UNITS UNDER THE SKIN DAILY, Disp: 5 pen, Rfl: 1    Insulin Pen Needle (B-D UF III MINI PEN NEEDLES) 31G X 5 MM MISC, by Does not apply route daily USE WITH BASAGLAR, Disp: 100 each, Rfl: 5    levothyroxine 50 mcg tablet, TAKE 1 TABLET BY MOUTH EVERY DAY, Disp: 90 tablet, Rfl: 1    losartan (COZAAR) 50 mg tablet, TAKE 1 TABLET BY MOUTH EVERY DAY, Disp: 90 tablet, Rfl: 3    metFORMIN (GLUCOPHAGE-XR) 500 mg 24 hr tablet, TAKE 1 TABLET BY MOUTH TWICE A DAY, Disp: 180 tablet, Rfl: 3    Omega-3 Fatty Acids (FISH OIL) 1,000 mg, Take 1 capsule by mouth daily, Disp: , Rfl:     sildenafil (REVATIO) 20 mg tablet, TAKE 1 TABLET BY MOUTH EVERY DAY AS NEEDED, Disp: 30 tablet, Rfl: 3    VITAMIN B COMPLEX-C PO, Take 1 tablet by mouth daily, Disp: , Rfl:     Current Facility-Administered Medications:     albuterol (ACCUNEB) nebulizer solution 1 25 mg, 1 25 mg, Nebulization, Q6H PRN, Lucas Ledesma MD  Allergies   Allergen Reactions    Simvastatin Rash    Atorvastatin      Other reaction(s): Itching    Levofloxacin      Other reaction(s): GI Upset    Pravastatin      Other reaction(s): Itching       Vitals: Blood pressure 116/70, pulse 69, temperature 97 6 °F (36 4 °C), temperature source Tympanic, resp  rate 18, height 5' 6" (1 676 m), weight 51 3 kg (113 lb), SpO2 98 %  Body mass index is 18 24 kg/m²  Oxygen Therapy  SpO2: 98 %      Physical Exam  Physical Exam  Vitals signs reviewed  Constitutional:       General: He is not in acute distress  Appearance: Normal appearance  He is well-developed and normal weight   He is not ill-appearing, toxic-appearing or diaphoretic  HENT:      Head: Normocephalic and atraumatic  Right Ear: External ear normal       Left Ear: External ear normal       Nose: Nose normal       Mouth/Throat:      Pharynx: No oropharyngeal exudate  Eyes:      General: No scleral icterus  Right eye: No discharge  Left eye: No discharge  Conjunctiva/sclera: Conjunctivae normal       Pupils: Pupils are equal, round, and reactive to light  Neck:      Musculoskeletal: Neck supple  Vascular: No JVD  Trachea: No tracheal deviation  Cardiovascular:      Rate and Rhythm: Normal rate and regular rhythm  Heart sounds: Normal heart sounds  No murmur  Pulmonary:      Effort: Pulmonary effort is normal  No respiratory distress  Breath sounds: No stridor  Rales present  No wheezing or rhonchi  Comments: Very mild holoinspiratory rales at right base, no wheeze or rhonchi  Abdominal:      General: Bowel sounds are normal  There is no distension  Palpations: Abdomen is soft  Tenderness: There is no abdominal tenderness  There is no guarding  Musculoskeletal:         General: Deformity present  No swelling  Right lower leg: No edema  Left lower leg: No edema  Comments: LE braces in place   Lymphadenopathy:      Cervical: No cervical adenopathy  Skin:     General: Skin is warm and dry  Capillary Refill: Capillary refill takes less than 2 seconds  Findings: No rash  Neurological:      Mental Status: He is alert and oriented to person, place, and time  Mental status is at baseline  Psychiatric:         Mood and Affect: Mood normal          Behavior: Behavior normal          Thought Content: Thought content normal          Labs: I have personally reviewed pertinent lab results    Lab Results   Component Value Date    WBC 6 48 06/04/2019    HGB 13 7 06/04/2019    HCT 39 9 06/04/2019    MCV 88 06/04/2019     06/04/2019     Lab Results   Component Value Date    GLUCOSE 112 2014    CALCIUM 9 3 2020     (L) 2014    K 4 4 2020    CO2 28 2020     2020    BUN 15 2020    CREATININE 0 87 2020     No results found for: IGE  Lab Results   Component Value Date    ALT 29 2020    AST 21 2020    ALKPHOS 78 2020    BILITOT 0 3 2014       3/2018 RA panel negative, CCP 4     Imaging and other studies: No new radiographs  2020 - noted continued LLL bronchiectasis and to lesser degree on right with some increase in peripheral ground glass component and interstitial thickening within MONICO and mild GGO in RUL, trace left effusion, mild mediastinal adenopathy    HRCT Chest 17 - no change in left lower lobe bronchiectasis and surrounding ground glass opacities, mild basilar segmental atelectasis, no air trapping, mild stable MONICO GGOs, no ILS thickening, no effusions     Pulmonary function testin2020 - Ratio 80%, FVC 2 09L (66%), FEV1 1 67L (72%) - moderate restrictive airflow defect    18 - Spirometry Ratio 69%, FVC 2 33L (72%), FEV1 1 61L (68%) - mild restrictive airflow defect     17 - Ratio 74%, FVC 2 55L (78%), FEV1 1 89L (79%) - very mild restrictive ventilatory defect with prior FVC 82% predicted in 2016  Ambulation Testing  2020 - 6MWT on RA - total walk distance 252 meters, initial SpO2 92%, gus 90%, at conclusion 92%, initial HR 84bpm, maximal HR 89bpm    19 - Ambulation Testing - On RA - SpO2 91%, ambulated nearly 50 meters with SpO2 increased to 97% - ambulation limited with knee brace and cane     EKG, Pathology, and Other Studies: I have personally reviewed pertinent reports     TTE 2016 EF 99%, grade I diastolic dysfunction, normal RV, mild-mod MR with eccentric posterior jet    Javon Jurado DO, [facility]

## 2020-08-26 ENCOUNTER — OFFICE VISIT (OUTPATIENT)
Dept: PULMONOLOGY | Facility: CLINIC | Age: 70
End: 2020-08-26
Payer: MEDICARE

## 2020-08-26 VITALS
OXYGEN SATURATION: 98 % | HEIGHT: 66 IN | BODY MASS INDEX: 18.16 KG/M2 | RESPIRATION RATE: 18 BRPM | WEIGHT: 113 LBS | TEMPERATURE: 97.6 F | HEART RATE: 69 BPM | DIASTOLIC BLOOD PRESSURE: 70 MMHG | SYSTOLIC BLOOD PRESSURE: 116 MMHG

## 2020-08-26 DIAGNOSIS — R93.89 ABNORMAL CHEST CT: Primary | ICD-10-CM

## 2020-08-26 DIAGNOSIS — J84.9 INTERSTITIAL LUNG DISEASE (HCC): ICD-10-CM

## 2020-08-26 DIAGNOSIS — J47.9 BRONCHIECTASIS WITHOUT COMPLICATION (HCC): ICD-10-CM

## 2020-08-26 PROCEDURE — 1160F RVW MEDS BY RX/DR IN RCRD: CPT | Performed by: INTERNAL MEDICINE

## 2020-08-26 PROCEDURE — 3060F POS MICROALBUMINURIA REV: CPT | Performed by: INTERNAL MEDICINE

## 2020-08-26 PROCEDURE — 3008F BODY MASS INDEX DOCD: CPT | Performed by: INTERNAL MEDICINE

## 2020-08-26 PROCEDURE — 3044F HG A1C LEVEL LT 7.0%: CPT | Performed by: INTERNAL MEDICINE

## 2020-08-26 PROCEDURE — 3078F DIAST BP <80 MM HG: CPT | Performed by: INTERNAL MEDICINE

## 2020-08-26 PROCEDURE — 94618 PULMONARY STRESS TESTING: CPT | Performed by: INTERNAL MEDICINE

## 2020-08-26 PROCEDURE — 99215 OFFICE O/P EST HI 40 MIN: CPT | Performed by: INTERNAL MEDICINE

## 2020-08-26 PROCEDURE — 3074F SYST BP LT 130 MM HG: CPT | Performed by: INTERNAL MEDICINE

## 2020-08-26 PROCEDURE — 1036F TOBACCO NON-USER: CPT | Performed by: INTERNAL MEDICINE

## 2020-08-26 PROCEDURE — 94010 BREATHING CAPACITY TEST: CPT | Performed by: INTERNAL MEDICINE

## 2020-08-26 NOTE — PATIENT INSTRUCTIONS
· Continue Breo 1puff daily  · Get blood tests completed  · Get complete PFTs completed  · Get CT Chest completed  · Follow up in 2 months or sooner as needed

## 2020-08-27 ENCOUNTER — APPOINTMENT (OUTPATIENT)
Dept: LAB | Facility: CLINIC | Age: 70
End: 2020-08-27
Payer: MEDICARE

## 2020-08-27 ENCOUNTER — TRANSCRIBE ORDERS (OUTPATIENT)
Dept: LAB | Facility: CLINIC | Age: 70
End: 2020-08-27

## 2020-08-27 DIAGNOSIS — J84.9 INTERSTITIAL LUNG DISEASE (HCC): ICD-10-CM

## 2020-08-27 DIAGNOSIS — R93.89 ABNORMAL CHEST CT: ICD-10-CM

## 2020-08-27 DIAGNOSIS — J47.9 BRONCHIECTASIS WITHOUT COMPLICATION (HCC): ICD-10-CM

## 2020-08-27 LAB
BASOPHILS # BLD AUTO: 0.04 THOUSANDS/ΜL (ref 0–0.1)
BASOPHILS NFR BLD AUTO: 1 % (ref 0–1)
CK MB SERPL-MCNC: 1.4 % (ref 0–2.5)
CK MB SERPL-MCNC: 2.6 NG/ML (ref 0–5)
CK SERPL-CCNC: 187 U/L (ref 39–308)
CRP SERPL QL: <3 MG/L
EOSINOPHIL # BLD AUTO: 0.22 THOUSAND/ΜL (ref 0–0.61)
EOSINOPHIL NFR BLD AUTO: 3 % (ref 0–6)
ERYTHROCYTE [DISTWIDTH] IN BLOOD BY AUTOMATED COUNT: 13.5 % (ref 11.6–15.1)
HCT VFR BLD AUTO: 43.8 % (ref 36.5–49.3)
HGB BLD-MCNC: 14.7 G/DL (ref 12–17)
IGA SERPL-MCNC: 118 MG/DL (ref 70–400)
IGG SERPL-MCNC: 1770 MG/DL (ref 700–1600)
IGM SERPL-MCNC: 311 MG/DL (ref 40–230)
IMM GRANULOCYTES # BLD AUTO: 0.04 THOUSAND/UL (ref 0–0.2)
IMM GRANULOCYTES NFR BLD AUTO: 1 % (ref 0–2)
LYMPHOCYTES # BLD AUTO: 0.76 THOUSANDS/ΜL (ref 0.6–4.47)
LYMPHOCYTES NFR BLD AUTO: 10 % (ref 14–44)
MCH RBC QN AUTO: 30.1 PG (ref 26.8–34.3)
MCHC RBC AUTO-ENTMCNC: 33.6 G/DL (ref 31.4–37.4)
MCV RBC AUTO: 90 FL (ref 82–98)
MONOCYTES # BLD AUTO: 0.42 THOUSAND/ΜL (ref 0.17–1.22)
MONOCYTES NFR BLD AUTO: 6 % (ref 4–12)
NEUTROPHILS # BLD AUTO: 6.11 THOUSANDS/ΜL (ref 1.85–7.62)
NEUTS SEG NFR BLD AUTO: 79 % (ref 43–75)
NRBC BLD AUTO-RTO: 0 /100 WBCS
PLATELET # BLD AUTO: 280 THOUSANDS/UL (ref 149–390)
PMV BLD AUTO: 8.6 FL (ref 8.9–12.7)
RBC # BLD AUTO: 4.89 MILLION/UL (ref 3.88–5.62)
SARS-COV-2 IGG+IGM SERPL QL IA: NORMAL
WBC # BLD AUTO: 7.59 THOUSAND/UL (ref 4.31–10.16)

## 2020-08-27 PROCEDURE — 86038 ANTINUCLEAR ANTIBODIES: CPT

## 2020-08-27 PROCEDURE — 86225 DNA ANTIBODY NATIVE: CPT

## 2020-08-27 PROCEDURE — 86430 RHEUMATOID FACTOR TEST QUAL: CPT

## 2020-08-27 PROCEDURE — 82553 CREATINE MB FRACTION: CPT

## 2020-08-27 PROCEDURE — 82550 ASSAY OF CK (CPK): CPT

## 2020-08-27 PROCEDURE — 86003 ALLG SPEC IGE CRUDE XTRC EA: CPT

## 2020-08-27 PROCEDURE — 86671 FUNGUS NES ANTIBODY: CPT

## 2020-08-27 PROCEDURE — 82085 ASSAY OF ALDOLASE: CPT

## 2020-08-27 PROCEDURE — 82784 ASSAY IGA/IGD/IGG/IGM EACH: CPT

## 2020-08-27 PROCEDURE — 86255 FLUORESCENT ANTIBODY SCREEN: CPT

## 2020-08-27 PROCEDURE — 36415 COLL VENOUS BLD VENIPUNCTURE: CPT

## 2020-08-27 PROCEDURE — 86235 NUCLEAR ANTIGEN ANTIBODY: CPT

## 2020-08-27 PROCEDURE — 83520 IMMUNOASSAY QUANT NOS NONAB: CPT

## 2020-08-27 PROCEDURE — 86140 C-REACTIVE PROTEIN: CPT

## 2020-08-27 PROCEDURE — 82785 ASSAY OF IGE: CPT

## 2020-08-27 PROCEDURE — 82787 IGG 1 2 3 OR 4 EACH: CPT

## 2020-08-27 PROCEDURE — 86769 SARS-COV-2 COVID-19 ANTIBODY: CPT

## 2020-08-27 PROCEDURE — 85025 COMPLETE CBC W/AUTO DIFF WBC: CPT

## 2020-08-27 PROCEDURE — 86606 ASPERGILLUS ANTIBODY: CPT

## 2020-08-27 PROCEDURE — 86602 ANTINOMYCES ANTIBODY: CPT

## 2020-08-27 PROCEDURE — 86331 IMMUNODIFFUSION OUCHTERLONY: CPT

## 2020-08-28 ENCOUNTER — DOCUMENTATION (OUTPATIENT)
Dept: CCU | Facility: HOSPITAL | Age: 70
End: 2020-08-28

## 2020-08-28 LAB
A ALTERNATA IGE QN: <0.1 KUA/I
A FUMIGATUS IGE QN: <0.1 KUA/I
ALDOLASE SERPL-CCNC: 5.9 U/L (ref 3.3–10.3)
ALLERGEN COMMENT: ABNORMAL
BERMUDA GRASS IGE QN: <0.1 KUA/I
BOXELDER IGE QN: 0.3 KUA/I
C HERBARUM IGE QN: <0.1 KUA/I
CAT DANDER IGE QN: <0.1 KUA/I
CMN PIGWEED IGE QN: <0.1 KUA/I
COMMON RAGWEED IGE QN: 0.1 KUA/I
COTTONWOOD IGE QN: <0.1 KUA/I
D FARINAE IGE QN: <0.1 KUA/I
D PTERONYSS IGE QN: <0.1 KUA/I
DOG DANDER IGE QN: <0.1 KUA/I
DSDNA AB SER-ACNC: 1 IU/ML (ref 0–9)
ENA RNP AB SER-ACNC: <0.2 AI (ref 0–0.9)
ENA SCL70 AB SER-ACNC: <0.2 AI (ref 0–0.9)
ENA SM AB SER-ACNC: <0.2 AI (ref 0–0.9)
ENA SS-A AB SER-ACNC: <0.2 AI (ref 0–0.9)
ENA SS-B AB SER-ACNC: <0.2 AI (ref 0–0.9)
IGG SERPL-MCNC: 1547 MG/DL (ref 603–1613)
IGG1 SER-MCNC: 658 MG/DL (ref 248–810)
IGG2 SER-MCNC: 556 MG/DL (ref 130–555)
IGG3 SER-MCNC: 65 MG/DL (ref 15–102)
IGG4 SER-MCNC: 108 MG/DL (ref 2–96)
LONDON PLANE IGE QN: <0.1 KUA/I
MOUSE URINE PROT IGE QN: <0.1 KUA/I
MT JUNIPER IGE QN: <0.1 KUA/I
MUGWORT IGE QN: 0.15 KUA/I
P NOTATUM IGE QN: <0.1 KUA/I
RHEUMATOID FACT SER QL LA: NEGATIVE
ROACH IGE QN: <0.1 KUA/I
RYE IGE QN: NEGATIVE
SHEEP SORREL IGE QN: <0.1 KUA/I
SILVER BIRCH IGE QN: 12.8 KUA/I
TIMOTHY IGE QN: <0.1 KUA/I
TOTAL IGE SMQN RAST: 75.2 KU/L (ref 0–113)
WALNUT IGE QN: 0.51 KUA/I
WHITE ASH IGE QN: 0.77 KUA/I
WHITE ELM IGE QN: 0.71 KUA/I
WHITE MULBERRY IGE QN: <0.1 KUA/I
WHITE OAK IGE QN: 5.53 KUA/I

## 2020-08-28 NOTE — PROGRESS NOTES
· COVID-19 antibodies are (+) IgG and IgM  · I attempted to call patient numerous times with no answer  · Office staff will continue to attempt contact and advise him of results, and advise this does not clearly confer immunity  · Offer for plasma donation will also be made      Kip Krabbe, DO

## 2020-08-31 LAB — GBM AB SER IA-ACNC: 2 UNITS (ref 0–20)

## 2020-09-02 LAB
C-ANCA TITR SER IF: ABNORMAL TITER
MYELOPEROXIDASE AB SER IA-ACNC: <9 U/ML (ref 0–9)
P-ANCA ATYPICAL TITR SER IF: ABNORMAL TITER
P-ANCA TITR SER IF: ABNORMAL TITER
PROTEINASE3 AB SER IA-ACNC: <3.5 U/ML (ref 0–3.5)

## 2020-09-05 LAB
A FUMIGATUS1 AB SER QL ID: NEGATIVE
A PULLULANS AB SER QL: NEGATIVE
LACEYELLA SACCHARI AB SER QL: NEGATIVE
PIGEON SERUM AB QL ID: NEGATIVE
S RECTIVIRGULA AB SER QL ID: NEGATIVE
T VULGARIS AB SER QL ID: NEGATIVE

## 2020-09-09 ENCOUNTER — HOSPITAL ENCOUNTER (OUTPATIENT)
Dept: PULMONOLOGY | Facility: HOSPITAL | Age: 70
Discharge: HOME | End: 2020-09-09
Attending: INTERNAL MEDICINE
Payer: MEDICARE

## 2020-09-09 DIAGNOSIS — J84.9 INTERSTITIAL LUNG DISEASE (HCC): ICD-10-CM

## 2020-09-09 DIAGNOSIS — J47.9 BRONCHIECTASIS WITHOUT COMPLICATION (HCC): ICD-10-CM

## 2020-09-09 DIAGNOSIS — R93.89 ABNORMAL CHEST CT: ICD-10-CM

## 2020-09-09 PROCEDURE — 94760 N-INVAS EAR/PLS OXIMETRY 1: CPT

## 2020-09-09 PROCEDURE — 94729 DIFFUSING CAPACITY: CPT | Performed by: INTERNAL MEDICINE

## 2020-09-09 PROCEDURE — 94010 BREATHING CAPACITY TEST: CPT | Performed by: INTERNAL MEDICINE

## 2020-09-09 PROCEDURE — 94729 DIFFUSING CAPACITY: CPT

## 2020-09-09 PROCEDURE — 94726 PLETHYSMOGRAPHY LUNG VOLUMES: CPT | Performed by: INTERNAL MEDICINE

## 2020-09-09 PROCEDURE — 94726 PLETHYSMOGRAPHY LUNG VOLUMES: CPT

## 2020-09-09 PROCEDURE — 94010 BREATHING CAPACITY TEST: CPT

## 2020-09-10 ENCOUNTER — HOSPITAL ENCOUNTER (OUTPATIENT)
Dept: RADIOLOGY | Facility: HOSPITAL | Age: 70
Discharge: HOME | End: 2020-09-10
Attending: INTERNAL MEDICINE
Payer: MEDICARE

## 2020-09-10 DIAGNOSIS — J47.9 BRONCHIECTASIS WITHOUT COMPLICATION (HCC): ICD-10-CM

## 2020-09-10 DIAGNOSIS — R93.89 ABNORMAL CHEST CT: ICD-10-CM

## 2020-09-10 DIAGNOSIS — J84.9 INTERSTITIAL LUNG DISEASE (HCC): ICD-10-CM

## 2020-09-10 PROCEDURE — 74230 X-RAY XM SWLNG FUNCJ C+: CPT

## 2020-09-10 PROCEDURE — 92611 MOTION FLUOROSCOPY/SWALLOW: CPT

## 2020-09-10 NOTE — PROCEDURES
Video Swallow Study      Patient Name: Elijah RODRIGUEZ Date: 9/10/2020        Past Medical History  Past Medical History:   Diagnosis Date    3-vessel CAD     CAP (community acquired pneumonia)     Last assessed - 3/15/16    Cardiac murmur     Disease of thyroid gland     History of poliomyelitis     Hyperlipidemia     Hypertension     Methicillin resistant Staphylococcus aureus infection     Mitral valve insufficiency     Nonspecific abnormal results of function study of thyroid     Last assessed - 10/15/14        Past Surgical History  Past Surgical History:   Procedure Laterality Date    CARDIAC SURGERY  1997    CORONARY ARTERY BYPASS GRAFT      x3; Last assessed - 9/29/17    NE COLONOSCOPY FLX DX W/COLLJ SPEC WHEN PFRMD N/A 3/27/2019    Procedure: COLONOSCOPY;  Surgeon: Tip Gonzalez MD;  Location: [facility]; Service: Gastroenterology         General Information:    69 yo gentleman referred to [facility]  for a VBS by Dr Flaquita Horta  for dysphagia, abnormal CT chest  Pt c/o chronic cough w/ clear mucous  Pt denied dysphagia and gross aspiration events  Cognition:  WNL    Speech/Swallow Mech: Oral motor movements appeared  WNL; Dentition was  Limited dentition; Respiratory Status: WFL on RA;   Current diet: regular diet w/ thin liquids  Prior VBS none  Pt was seen in radiology for a Video Barium Swallow Study, pt stood and was viewed laterally with the following consistencies: puree, soft/solid, hard solid, HTL, NTL, thin liquids by cup and straw, barium pill w/ water   Results are as follows:     **Images are available for review on PACS          Oral Stage: [facility]   Adequate bolus retrieval via cup, straw, spoon, able to bite cookie  Prolonged mastication w/ limited dentition, but adequate mastication and bolus formation  Pt exhibited good oral control of liquids by cup and straw  Pharyngeal Stage:  WNL   Swallow initiation was timely with complete epiglottic inversion and airway closure  No pharyngeal retention, laryngeal penetration or aspiration observed  Esophageal Stage:   briefly assessed; no overt abnormality  All materials cleared the esophagus without difficulty  Assessment Summary:   Oral and pharyngeal stages of swallowing appeared WNL- no pharyngeal retention, laryngeal penetration or aspiration observed  Diagnosis/Prognosis:            Recommendations:   Regular diet with thin liquids  meds as tolerated       April Alea Ramey MA CCC-SLP  Speech Patholgist  [place] license # [id number] [id number]  [place] license # [id number]  Available via [facility]

## 2020-09-14 DIAGNOSIS — R05.9 COUGH: ICD-10-CM

## 2020-09-14 RX ORDER — ALBUTEROL SULFATE 90 UG/1
2 AEROSOL, METERED RESPIRATORY (INHALATION) EVERY 6 HOURS PRN
Qty: 18 G | Refills: 0 | Status: SHIPPED | OUTPATIENT
Start: 2020-09-14 | End: 2020-10-07

## 2020-09-21 DIAGNOSIS — J30.2 SEASONAL ALLERGIES: ICD-10-CM

## 2020-09-21 RX ORDER — BENZONATATE 100 MG/1
100 CAPSULE ORAL
Qty: 20 CAPSULE | Refills: 0 | Status: SHIPPED | OUTPATIENT
Start: 2020-09-21 | End: 2020-10-07 | Stop reason: SDUPTHER

## 2020-09-25 ENCOUNTER — HOSPITAL ENCOUNTER (OUTPATIENT)
Dept: CT IMAGING | Facility: HOSPITAL | Age: 70
Discharge: HOME | End: 2020-09-25
Attending: INTERNAL MEDICINE
Payer: MEDICARE

## 2020-09-25 DIAGNOSIS — J47.9 BRONCHIECTASIS WITHOUT COMPLICATION (HCC): ICD-10-CM

## 2020-09-25 DIAGNOSIS — J84.9 INTERSTITIAL LUNG DISEASE (HCC): ICD-10-CM

## 2020-09-25 DIAGNOSIS — R93.89 ABNORMAL CHEST CT: ICD-10-CM

## 2020-09-25 PROCEDURE — 71250 CT THORAX DX C-: CPT

## 2020-09-25 PROCEDURE — G1004 CDSM NDSC: HCPCS

## 2020-10-05 ENCOUNTER — OFFICE VISIT (OUTPATIENT)
Dept: PULMONOLOGY | Facility: CLINIC | Age: 70
End: 2020-10-05
Payer: MEDICARE

## 2020-10-05 VITALS
HEART RATE: 82 BPM | OXYGEN SATURATION: 94 % | BODY MASS INDEX: 19.29 KG/M2 | SYSTOLIC BLOOD PRESSURE: 136 MMHG | TEMPERATURE: 98.1 F | DIASTOLIC BLOOD PRESSURE: 60 MMHG | HEIGHT: 64 IN | WEIGHT: 113 LBS

## 2020-10-05 DIAGNOSIS — E13.9 DIABETES 1.5, MANAGED AS TYPE 2 (HCC): ICD-10-CM

## 2020-10-05 DIAGNOSIS — J47.9 BRONCHIECTASIS WITHOUT COMPLICATION (HCC): ICD-10-CM

## 2020-10-05 DIAGNOSIS — R93.89 ABNORMAL CHEST CT: ICD-10-CM

## 2020-10-05 DIAGNOSIS — J84.9 INTERSTITIAL LUNG DISEASE (HCC): Primary | ICD-10-CM

## 2020-10-05 PROCEDURE — 99215 OFFICE O/P EST HI 40 MIN: CPT | Performed by: INTERNAL MEDICINE

## 2020-10-05 RX ORDER — HYDROCORTISONE 25 MG/G
CREAM TOPICAL
COMMUNITY
Start: 2020-08-23 | End: 2020-10-22 | Stop reason: SDUPTHER

## 2020-10-05 RX ORDER — MULTIVIT-MIN/IRON/FOLIC ACID/K 18-600-40
CAPSULE ORAL
COMMUNITY
End: 2020-10-16 | Stop reason: ALTCHOICE

## 2020-10-07 ENCOUNTER — OFFICE VISIT (OUTPATIENT)
Dept: INTERNAL MEDICINE CLINIC | Facility: CLINIC | Age: 70
End: 2020-10-07
Payer: MEDICARE

## 2020-10-07 VITALS
HEIGHT: 64 IN | BODY MASS INDEX: 19.33 KG/M2 | SYSTOLIC BLOOD PRESSURE: 110 MMHG | TEMPERATURE: 97.8 F | WEIGHT: 113.2 LBS | RESPIRATION RATE: 16 BRPM | DIASTOLIC BLOOD PRESSURE: 64 MMHG | HEART RATE: 80 BPM | OXYGEN SATURATION: 97 %

## 2020-10-07 DIAGNOSIS — J84.9 INTERSTITIAL LUNG DISEASE (HCC): ICD-10-CM

## 2020-10-07 DIAGNOSIS — J30.2 SEASONAL ALLERGIES: ICD-10-CM

## 2020-10-07 DIAGNOSIS — R05.9 COUGH: ICD-10-CM

## 2020-10-07 DIAGNOSIS — I10 ESSENTIAL HYPERTENSION: ICD-10-CM

## 2020-10-07 DIAGNOSIS — J47.9 BRONCHIECTASIS WITHOUT COMPLICATION (HCC): ICD-10-CM

## 2020-10-07 DIAGNOSIS — E11.8 TYPE 2 DIABETES MELLITUS WITH COMPLICATION (HCC): Primary | ICD-10-CM

## 2020-10-07 DIAGNOSIS — Z23 NEED FOR VACCINATION: ICD-10-CM

## 2020-10-07 PROCEDURE — 90732 PPSV23 VACC 2 YRS+ SUBQ/IM: CPT

## 2020-10-07 PROCEDURE — 99214 OFFICE O/P EST MOD 30 MIN: CPT | Performed by: INTERNAL MEDICINE

## 2020-10-07 PROCEDURE — G0009 ADMIN PNEUMOCOCCAL VACCINE: HCPCS

## 2020-10-07 RX ORDER — BENZONATATE 100 MG/1
100 CAPSULE ORAL
Qty: 30 CAPSULE | Refills: 0 | Status: SHIPPED | OUTPATIENT
Start: 2020-10-07 | End: 2020-11-09 | Stop reason: SDUPTHER

## 2020-10-12 DIAGNOSIS — K64.8 INTERNAL HEMORRHOID: ICD-10-CM

## 2020-10-12 RX ORDER — HYDROCORTISONE 25 MG/G
CREAM TOPICAL
Qty: 30 G | Refills: 5 | Status: SHIPPED | OUTPATIENT
Start: 2020-10-12 | End: 2020-10-16 | Stop reason: SDUPTHER

## 2020-10-14 DIAGNOSIS — N52.9 ERECTILE DYSFUNCTION, UNSPECIFIED ERECTILE DYSFUNCTION TYPE: ICD-10-CM

## 2020-10-14 RX ORDER — SILDENAFIL CITRATE 20 MG/1
TABLET ORAL
Qty: 90 TABLET | Refills: 1 | Status: SHIPPED | OUTPATIENT
Start: 2020-10-14

## 2020-10-16 ENCOUNTER — ANESTHESIA EVENT (OUTPATIENT)
Dept: GASTROENTEROLOGY | Facility: HOSPITAL | Age: 70
End: 2020-10-16

## 2020-10-16 ENCOUNTER — TELEPHONE (OUTPATIENT)
Dept: PULMONOLOGY | Facility: CLINIC | Age: 70
End: 2020-10-16

## 2020-10-16 ENCOUNTER — ANESTHESIA (OUTPATIENT)
Dept: GASTROENTEROLOGY | Facility: HOSPITAL | Age: 70
End: 2020-10-16

## 2020-10-16 ENCOUNTER — HOSPITAL ENCOUNTER (OUTPATIENT)
Dept: GASTROENTEROLOGY | Facility: HOSPITAL | Age: 70
Setting detail: OUTPATIENT SURGERY
Discharge: HOME | End: 2020-10-16
Attending: INTERNAL MEDICINE | Admitting: INTERNAL MEDICINE
Payer: MEDICARE

## 2020-10-16 VITALS
WEIGHT: 111.13 LBS | DIASTOLIC BLOOD PRESSURE: 57 MMHG | HEIGHT: 66 IN | TEMPERATURE: 97.1 F | BODY MASS INDEX: 17.86 KG/M2 | HEART RATE: 75 BPM | RESPIRATION RATE: 16 BRPM | OXYGEN SATURATION: 91 % | SYSTOLIC BLOOD PRESSURE: 117 MMHG

## 2020-10-16 VITALS — HEART RATE: 78 BPM

## 2020-10-16 DIAGNOSIS — J84.9 INTERSTITIAL LUNG DISEASE (HCC): ICD-10-CM

## 2020-10-16 PROCEDURE — 87252 VIRUS INOCULATION TISSUE: CPT | Performed by: INTERNAL MEDICINE

## 2020-10-16 PROCEDURE — 87206 SMEAR FLUORESCENT/ACID STAI: CPT | Performed by: INTERNAL MEDICINE

## 2020-10-16 PROCEDURE — 88305 TISSUE EXAM BY PATHOLOGIST: CPT | Performed by: PATHOLOGY

## 2020-10-16 PROCEDURE — 88185 FLOWCYTOMETRY/TC ADD-ON: CPT | Performed by: INTERNAL MEDICINE

## 2020-10-16 PROCEDURE — 88184 FLOWCYTOMETRY/ TC 1 MARKER: CPT | Performed by: INTERNAL MEDICINE

## 2020-10-16 PROCEDURE — 89051 BODY FLUID CELL COUNT: CPT | Performed by: PATHOLOGY

## 2020-10-16 PROCEDURE — 87102 FUNGUS ISOLATION CULTURE: CPT | Performed by: INTERNAL MEDICINE

## 2020-10-16 PROCEDURE — 31624 DX BRONCHOSCOPE/LAVAGE: CPT | Performed by: INTERNAL MEDICINE

## 2020-10-16 PROCEDURE — 87116 MYCOBACTERIA CULTURE: CPT | Performed by: INTERNAL MEDICINE

## 2020-10-16 PROCEDURE — 88112 CYTOPATH CELL ENHANCE TECH: CPT | Performed by: PATHOLOGY

## 2020-10-16 PROCEDURE — 87070 CULTURE OTHR SPECIMN AEROBIC: CPT | Performed by: INTERNAL MEDICINE

## 2020-10-16 PROCEDURE — 82948 REAGENT STRIP/BLOOD GLUCOSE: CPT

## 2020-10-16 RX ORDER — GLYCOPYRROLATE 0.2 MG/ML
INJECTION INTRAMUSCULAR; INTRAVENOUS AS NEEDED
Status: DISCONTINUED | OUTPATIENT
Start: 2020-10-16 | End: 2020-10-16

## 2020-10-16 RX ORDER — PROPOFOL 10 MG/ML
INJECTION, EMULSION INTRAVENOUS CONTINUOUS PRN
Status: DISCONTINUED | OUTPATIENT
Start: 2020-10-16 | End: 2020-10-16

## 2020-10-16 RX ORDER — FENTANYL CITRATE 50 UG/ML
INJECTION, SOLUTION INTRAMUSCULAR; INTRAVENOUS AS NEEDED
Status: DISCONTINUED | OUTPATIENT
Start: 2020-10-16 | End: 2020-10-16

## 2020-10-16 RX ORDER — ASCORBIC ACID 500 MG
500 TABLET ORAL DAILY
COMMUNITY

## 2020-10-16 RX ORDER — KETAMINE HYDROCHLORIDE 50 MG/ML
INJECTION, SOLUTION, CONCENTRATE INTRAMUSCULAR; INTRAVENOUS AS NEEDED
Status: DISCONTINUED | OUTPATIENT
Start: 2020-10-16 | End: 2020-10-16

## 2020-10-16 RX ORDER — SODIUM CHLORIDE, SODIUM LACTATE, POTASSIUM CHLORIDE, CALCIUM CHLORIDE 600; 310; 30; 20 MG/100ML; MG/100ML; MG/100ML; MG/100ML
INJECTION, SOLUTION INTRAVENOUS CONTINUOUS PRN
Status: DISCONTINUED | OUTPATIENT
Start: 2020-10-16 | End: 2020-10-16

## 2020-10-16 RX ORDER — PROPOFOL 10 MG/ML
INJECTION, EMULSION INTRAVENOUS AS NEEDED
Status: DISCONTINUED | OUTPATIENT
Start: 2020-10-16 | End: 2020-10-16

## 2020-10-16 RX ADMIN — PROPOFOL 50 MCG/KG/MIN: 10 INJECTION, EMULSION INTRAVENOUS at 08:08

## 2020-10-16 RX ADMIN — KETAMINE HYDROCHLORIDE 20 MG: 50 INJECTION INTRAMUSCULAR; INTRAVENOUS at 08:07

## 2020-10-16 RX ADMIN — GLYCOPYRROLATE 0.1 MG: 0.2 INJECTION, SOLUTION INTRAMUSCULAR; INTRAVENOUS at 08:01

## 2020-10-16 RX ADMIN — FENTANYL CITRATE 25 MCG: 50 INJECTION INTRAMUSCULAR; INTRAVENOUS at 08:07

## 2020-10-16 RX ADMIN — PROPOFOL 70 MG: 10 INJECTION, EMULSION INTRAVENOUS at 08:07

## 2020-10-16 RX ADMIN — SODIUM CHLORIDE, SODIUM LACTATE, POTASSIUM CHLORIDE, AND CALCIUM CHLORIDE: .6; .31; .03; .02 INJECTION, SOLUTION INTRAVENOUS at 07:50

## 2020-10-17 LAB
GLUCOSE SERPL-MCNC: 70 MG/DL (ref 65–140)
SCAN RESULT: NORMAL

## 2020-10-18 LAB
BACTERIA BRONCH AEROBE CULT: NORMAL
GRAM STN SPEC: NORMAL
GRAM STN SPEC: NORMAL

## 2020-10-22 DIAGNOSIS — K62.5 RECTAL BLEEDING: Primary | ICD-10-CM

## 2020-10-22 RX ORDER — HYDROCORTISONE 25 MG/G
CREAM TOPICAL
Qty: 28 G | Refills: 0 | Status: SHIPPED | OUTPATIENT
Start: 2020-10-22 | End: 2021-01-04

## 2020-11-09 DIAGNOSIS — J30.2 SEASONAL ALLERGIES: ICD-10-CM

## 2020-11-09 DIAGNOSIS — R05.9 COUGH: ICD-10-CM

## 2020-11-09 RX ORDER — ALBUTEROL SULFATE 90 UG/1
2 AEROSOL, METERED RESPIRATORY (INHALATION) EVERY 6 HOURS PRN
Qty: 18 INHALER | Refills: 1 | Status: SHIPPED | OUTPATIENT
Start: 2020-11-09 | End: 2021-06-18 | Stop reason: SDUPTHER

## 2020-11-09 RX ORDER — BENZONATATE 100 MG/1
100 CAPSULE ORAL
Qty: 30 CAPSULE | Refills: 0 | Status: SHIPPED | OUTPATIENT
Start: 2020-11-09 | End: 2021-05-24 | Stop reason: ALTCHOICE

## 2020-11-10 DIAGNOSIS — I10 HTN (HYPERTENSION): ICD-10-CM

## 2020-11-10 RX ORDER — LOSARTAN POTASSIUM 50 MG/1
TABLET ORAL
Qty: 90 TABLET | Refills: 3 | Status: SHIPPED | OUTPATIENT
Start: 2020-11-10 | End: 2021-09-12

## 2020-11-15 DIAGNOSIS — E11.9 TYPE 2 DIABETES MELLITUS WITHOUT COMPLICATION, WITHOUT LONG-TERM CURRENT USE OF INSULIN (HCC): ICD-10-CM

## 2020-11-15 RX ORDER — METFORMIN HYDROCHLORIDE 500 MG/1
TABLET, EXTENDED RELEASE ORAL
Qty: 180 TABLET | Refills: 3 | Status: SHIPPED | OUTPATIENT
Start: 2020-11-15 | End: 2021-01-14

## 2020-11-16 ENCOUNTER — OFFICE VISIT (OUTPATIENT)
Dept: PULMONOLOGY | Facility: CLINIC | Age: 70
End: 2020-11-16
Payer: MEDICARE

## 2020-11-16 VITALS
TEMPERATURE: 98.5 F | HEART RATE: 81 BPM | OXYGEN SATURATION: 90 % | WEIGHT: 115 LBS | BODY MASS INDEX: 19.63 KG/M2 | HEIGHT: 64 IN | DIASTOLIC BLOOD PRESSURE: 64 MMHG | SYSTOLIC BLOOD PRESSURE: 122 MMHG

## 2020-11-16 DIAGNOSIS — J47.9 BRONCHIECTASIS WITHOUT COMPLICATION (HCC): ICD-10-CM

## 2020-11-16 DIAGNOSIS — E11.8 TYPE 2 DIABETES MELLITUS WITH COMPLICATION (HCC): ICD-10-CM

## 2020-11-16 DIAGNOSIS — R93.89 ABNORMAL CHEST CT: ICD-10-CM

## 2020-11-16 DIAGNOSIS — R05.9 COUGH: ICD-10-CM

## 2020-11-16 DIAGNOSIS — J84.9 INTERSTITIAL LUNG DISEASE (HCC): Primary | ICD-10-CM

## 2020-11-16 LAB — FUNGUS SPEC CULT: NORMAL

## 2020-11-16 PROCEDURE — 99215 OFFICE O/P EST HI 40 MIN: CPT | Performed by: INTERNAL MEDICINE

## 2020-11-16 RX ORDER — PREDNISONE 20 MG/1
40 TABLET ORAL DAILY
Qty: 120 TABLET | Refills: 0 | Status: SHIPPED | OUTPATIENT
Start: 2020-11-16 | End: 2021-01-11

## 2020-11-16 RX ORDER — FLUTICASONE FUROATE AND VILANTEROL 200; 25 UG/1; UG/1
1 POWDER RESPIRATORY (INHALATION) DAILY
Qty: 3 INHALER | Refills: 3 | Status: SHIPPED | OUTPATIENT
Start: 2020-11-16 | End: 2021-02-26

## 2020-11-30 LAB
MYCOBACTERIUM SPEC CULT: NORMAL
RHODAMINE-AURAMINE STN SPEC: NORMAL

## 2020-12-09 DIAGNOSIS — E11.8 TYPE 2 DIABETES MELLITUS WITH COMPLICATION (HCC): ICD-10-CM

## 2020-12-09 RX ORDER — INSULIN GLARGINE 100 [IU]/ML
INJECTION, SOLUTION SUBCUTANEOUS
Qty: 5 PEN | Refills: 1 | Status: SHIPPED | OUTPATIENT
Start: 2020-12-09 | End: 2020-12-24 | Stop reason: SDUPTHER

## 2020-12-23 ENCOUNTER — TELEPHONE (OUTPATIENT)
Dept: INTERNAL MEDICINE CLINIC | Facility: CLINIC | Age: 70
End: 2020-12-23

## 2020-12-23 DIAGNOSIS — E11.8 TYPE 2 DIABETES MELLITUS WITH COMPLICATION (HCC): ICD-10-CM

## 2020-12-24 DIAGNOSIS — E11.8 TYPE 2 DIABETES MELLITUS WITH COMPLICATION (HCC): ICD-10-CM

## 2020-12-24 RX ORDER — INSULIN GLARGINE 100 [IU]/ML
20 INJECTION, SOLUTION SUBCUTANEOUS DAILY
Qty: 5 PEN | Refills: 2 | Status: SHIPPED | OUTPATIENT
Start: 2020-12-24 | End: 2021-01-14 | Stop reason: SDUPTHER

## 2020-12-26 ENCOUNTER — HOSPITAL ENCOUNTER (OUTPATIENT)
Dept: CT IMAGING | Facility: HOSPITAL | Age: 70
Discharge: HOME | End: 2020-12-26
Attending: INTERNAL MEDICINE
Payer: MEDICARE

## 2020-12-26 DIAGNOSIS — J84.9 INTERSTITIAL LUNG DISEASE (HCC): ICD-10-CM

## 2020-12-26 PROCEDURE — 71250 CT THORAX DX C-: CPT

## 2020-12-26 PROCEDURE — G1004 CDSM NDSC: HCPCS

## 2020-12-29 RX ORDER — INSULIN GLARGINE 100 [IU]/ML
10 INJECTION, SOLUTION SUBCUTANEOUS DAILY
Qty: 5 PEN | Refills: 0 | Status: SHIPPED | OUTPATIENT
Start: 2020-12-29 | End: 2021-01-14 | Stop reason: ALTCHOICE

## 2020-12-30 ENCOUNTER — DOCUMENTATION (OUTPATIENT)
Dept: PULMONOLOGY | Facility: HOSPITAL | Age: 70
End: 2020-12-30

## 2021-01-04 ENCOUNTER — TELEPHONE (OUTPATIENT)
Dept: INTERNAL MEDICINE CLINIC | Facility: CLINIC | Age: 71
End: 2021-01-04

## 2021-01-04 DIAGNOSIS — K62.5 RECTAL BLEEDING: ICD-10-CM

## 2021-01-04 RX ORDER — HYDROCORTISONE 25 MG/G
CREAM TOPICAL
Qty: 30 G | Refills: 4 | Status: SHIPPED | OUTPATIENT
Start: 2021-01-04 | End: 2021-12-06

## 2021-01-04 NOTE — TELEPHONE ENCOUNTER
Patient said the insulin you ordered for him last week will cost him over $300  He is asking for an alternative  Please advise

## 2021-01-05 ENCOUNTER — DOCUMENTATION (OUTPATIENT)
Dept: INTERNAL MEDICINE CLINIC | Facility: CLINIC | Age: 71
End: 2021-01-05

## 2021-01-05 NOTE — PROGRESS NOTES
[address] [facility] [place], PharmD, Maria [facility] Carmen Gamble    The following is per review of patient's pertinent medical/medication chart:    Reason for documentation: Per PCP request, patient's insurance formulary reviewed for insulin    Noted PCP office has Basaglar pen samples that will be expiring at the end of 1/31/2021, patient would benefit from using this samples in interim  May consider pharm referral to f/u on SMBG readings while on steroid   Noted next pulm appt is in 2/2021    Demographics  Interaction Method: Virtual    Topic(s) Addressed  Diabetes    Intervention(s) Made      Non-Pharmacologic:      Other    Tool(s) Used  Not Applicable    Time Spent:   Time Spent in Direct Patient Care: 0 minutes    Time Spent in Care Coordination: 10 minutes    Recommendation(s) Accepted by the Patient/Caregiver: Not Applicable

## 2021-01-05 NOTE — TELEPHONE ENCOUNTER
There are Basaglar pen samples in the Back Fridge that will be expiring on 2021  If Dr Mercedes Lam agrees, please dispense x2 pens to patient and inform patient he will need to dispose of both pens on 2021 regardless of how much is left in pen d/t variable blood sugar controlled with  insulin  If patient will be receiving insulin samples, please also schedule him with me within the next week to review blood sugar readings while on insulin to verify he is on the appropriate dose

## 2021-01-06 ENCOUNTER — TELEPHONE (OUTPATIENT)
Dept: INTERNAL MEDICINE CLINIC | Facility: CLINIC | Age: 71
End: 2021-01-06

## 2021-01-06 DIAGNOSIS — E11.8 TYPE 2 DIABETES MELLITUS WITH COMPLICATION (HCC): Primary | ICD-10-CM

## 2021-01-06 NOTE — TELEPHONE ENCOUNTER
Pending Pharm Referral for PCP signature/concurrence  Patient has appt with PharmD for DM mgmt on 1/14/2021

## 2021-01-06 NOTE — TELEPHONE ENCOUNTER
Spoke to patient and advised  We only had 1 box of Basaglar to give the patient  He scheduled an appointment with you on 1/14/21 at 1 pm to review

## 2021-01-09 DIAGNOSIS — J84.9 INTERSTITIAL LUNG DISEASE (HCC): ICD-10-CM

## 2021-01-11 RX ORDER — PREDNISONE 20 MG/1
TABLET ORAL
Qty: 120 TABLET | Refills: 0 | Status: SHIPPED | OUTPATIENT
Start: 2021-01-11 | End: 2021-03-18 | Stop reason: ALTCHOICE

## 2021-01-14 ENCOUNTER — CLINICAL SUPPORT (OUTPATIENT)
Dept: INTERNAL MEDICINE CLINIC | Facility: CLINIC | Age: 71
End: 2021-01-14

## 2021-01-14 DIAGNOSIS — E11.9 TYPE 2 DIABETES MELLITUS WITHOUT COMPLICATION, WITHOUT LONG-TERM CURRENT USE OF INSULIN (HCC): ICD-10-CM

## 2021-01-14 RX ORDER — METFORMIN HYDROCHLORIDE 500 MG/1
1000 TABLET, EXTENDED RELEASE ORAL 2 TIMES DAILY
Qty: 360 TABLET | Refills: 1 | Status: SHIPPED | OUTPATIENT
Start: 2021-01-14 | End: 2021-06-15

## 2021-01-14 RX ORDER — INSULIN GLARGINE 300 U/ML
25 INJECTION, SOLUTION SUBCUTANEOUS DAILY
Qty: 1 PEN | Refills: 0 | Status: SHIPPED | COMMUNITY
Start: 2021-01-14 | End: 2021-02-11 | Stop reason: ALTCHOICE

## 2021-01-14 NOTE — TELEPHONE ENCOUNTER
Per pharmacist encounter on 01/14/21, pending orders for signature/concurrence from Keo Dodd referral   Toujeo - sample provided   Metformin

## 2021-01-14 NOTE — PATIENT INSTRUCTIONS
Metformin: increase to 2 tablets twice daily    Insulin: take 25 units once daily    Get blood work done before you see Elisa Langford

## 2021-01-14 NOTE — PROGRESS NOTES
[address] [facility] [place], PharmD, BCACP      Reason for visit: Appointment with 65y o  year old for management of T2DM  Current DM Regimen:   Metformin XR 500mg BID   Basaglar     Currently on prednisone; started 11/16/2020, dose last reduced 12/30/2020; next pulm appt 2/10/2021    ASSESSMENT/PLAN                                                                                     1  Type 2 diabetes: goal A1c < 7 5% based on ADA guidelines  Most recent A1c below goal but due to be updated  SMBG readings frequently below goal with frequent hypoglycemia  Patient has been taking basal insulin TID, has been doing so since starting on prednisone  Would benefit from further increasing metformin    Could consider SGLT2i d/t kidney benefits  BMP acceptable  No serum B12 while on metformin      Duration: > 20 years  Microvascular complications: microalbuminuria  Macrovascular complications: CABG  (Yes ) Aspirin (No) Statin, allergy x3 statins (Yes ) ACEI/ARB  Eye Exam:  11/2018   Foot Exam: 10/2020     Most recent labs and diabetes goals discussed with patient in clinic today   MEDICATIONS: Per discussion with PCP, will have patient increase metformin to 2gm/day and adjust insulin dose to 25 units daily; will convert Basaglar to [employer] for now    o Will pend rx for PCP signature/concurrence  o Consider Relion NPH if cost concerns continue   SMBG: daily    Follow-Up: 1-2 weeks  _x_ Home Monitoring Records, BG/BP  _x_ Labs, patient agrees to have PCP labs as ordered on 10/2020 drawn prior to next appt      SUBJECTIVE                                                                                                            Diagnosed with diabetes: > 20 years  ASCVD History: CABG   Previous DM medications/tolerability:    None    1   Medication Adherence: Medication list reviewed with patient, reports the following discrepancies/problems:   Basaglar: has been taking BID-TID since he started on prednisone; prior to starting on prednisone in 11/2020, patient was taking 20 units once daily  Varies dose based on his SMBG reading  Almost out of sample he received from PCP office last week   o Morning: 15-25 units  o Lunch: 10-15 units  o Dinner: 20-25 units   MetFORMIN: denies previous trial with larger doses, has been on metformin since he was dx with diabetes   PredniSONE: taking 20mg daily as prescribed; stopped taking x2 days this week as his tooth was falling out and dentist instructed him to hold prednisone x2 days to reduce infection risk  Feels he gets very confused about his medications  Has cost concerns with getting insulin from pharmacy; knows he has not met his deductible but uncertain that he will be able to afford insulin at this time  2  Medication Efficacy:    Review of Systems   Gastrointestinal: Negative for constipation, diarrhea, nausea and vomiting  Hypoglycemia history: SMBG readings < 70mg/dL frequently throughout the day and night   Knows how to treat: Yes, d/t frequent hypoglycemia episodes he has been keeping juice next to bed   Unsymptomatic hypoglycemia: No, reports hypoglycemia s/sx frequently, has been feeling very weak d/t hypoglycemia frequency        OBJECTIVE                                                                                                      SMBG readings (1/1-1/14/2021):  FBG Average: 73 mg/dl ( mg/dl), n=6, 3/6= < 70 mg/dl   Pre-Lunch BG Average: 175 mg/dl ( mg/dl), n=8, 0/8= < 70 mg/dl   Pre-Supper BG Average: 115 mg/dl ( mg/dl), n=4, 1/4= < 70 mg/dl   HS BG Average: 162 mg/dl ( mg/dl), n=5, 0/5= < 70 mg/dl   Nocturnal Average: 41 mg/dl (41-41 mg/dl), n=2, 2/2= < 70 mg/dl     Pertinent Lab Data:    Lab Results   Component Value Date    SODIUM 138 07/08/2020    K 4 4 07/08/2020     07/08/2020    CO2 28 07/08/2020    BUN 15 07/08/2020    CREATININE 0 87 07/08/2020    GLUC 95 06/04/2019    CALCIUM 9 3 07/08/2020       Lab Results   Component Value Date    HGBA1C 6 4 (H) 07/08/2020       No results found for: Phu Ag    Microalbumin/Creatinine: 259 (7/2020)    Demographics  Interaction Method: Face to Face  Type of Intervention: New    Topic(s) Addressed  Diabetes    Intervention(s) Made    Pharmacologic:      Medication Adjustment - Dose or Frequency: Insulin, Metformin    Non-Pharmacologic:      Other    Tool(s) Used  Not Applicable    Time Spent:   Time Spent in Direct Patient Care: 40 minutes    Time Spent in Care Coordination: 40 minutes    Recommendation(s) Accepted by the Patient/Caregiver:  All Accepted

## 2021-01-17 DIAGNOSIS — E03.9 HYPOTHYROIDISM, UNSPECIFIED TYPE: ICD-10-CM

## 2021-01-18 RX ORDER — LEVOTHYROXINE SODIUM 0.05 MG/1
TABLET ORAL
Qty: 90 TABLET | Refills: 1 | Status: SHIPPED | OUTPATIENT
Start: 2021-01-18 | End: 2021-07-08

## 2021-01-21 ENCOUNTER — APPOINTMENT (OUTPATIENT)
Dept: LAB | Facility: CLINIC | Age: 71
End: 2021-01-21
Payer: MEDICARE

## 2021-01-21 DIAGNOSIS — E11.8 TYPE 2 DIABETES MELLITUS WITH COMPLICATION (HCC): ICD-10-CM

## 2021-01-21 LAB
ALBUMIN SERPL BCP-MCNC: 3.2 G/DL (ref 3.5–5)
ALP SERPL-CCNC: 54 U/L (ref 46–116)
ALT SERPL W P-5'-P-CCNC: 47 U/L (ref 12–78)
ANION GAP SERPL CALCULATED.3IONS-SCNC: 6 MMOL/L (ref 4–13)
AST SERPL W P-5'-P-CCNC: 20 U/L (ref 5–45)
BILIRUB SERPL-MCNC: 0.5 MG/DL (ref 0.2–1)
BUN SERPL-MCNC: 28 MG/DL (ref 5–25)
CALCIUM ALBUM COR SERPL-MCNC: 9.8 MG/DL (ref 8.3–10.1)
CALCIUM SERPL-MCNC: 9.2 MG/DL (ref 8.3–10.1)
CHLORIDE SERPL-SCNC: 99 MMOL/L (ref 100–108)
CHOLEST SERPL-MCNC: 222 MG/DL (ref 50–200)
CO2 SERPL-SCNC: 31 MMOL/L (ref 21–32)
CREAT SERPL-MCNC: 0.91 MG/DL (ref 0.6–1.3)
CREAT UR-MCNC: 40.2 MG/DL
EST. AVERAGE GLUCOSE BLD GHB EST-MCNC: 177 MG/DL
GFR SERPL CREATININE-BSD FRML MDRD: 84 ML/MIN/1.73SQ M
GLUCOSE P FAST SERPL-MCNC: 64 MG/DL (ref 65–99)
HBA1C MFR BLD: 7.8 %
HDLC SERPL-MCNC: 98 MG/DL
LDLC SERPL CALC-MCNC: 99 MG/DL (ref 0–100)
MICROALBUMIN UR-MCNC: 37.8 MG/L (ref 0–20)
MICROALBUMIN/CREAT 24H UR: 94 MG/G CREATININE (ref 0–30)
POTASSIUM SERPL-SCNC: 4.5 MMOL/L (ref 3.5–5.3)
PROT SERPL-MCNC: 6.6 G/DL (ref 6.4–8.2)
SODIUM SERPL-SCNC: 136 MMOL/L (ref 136–145)
TRIGL SERPL-MCNC: 123 MG/DL

## 2021-01-21 PROCEDURE — 82570 ASSAY OF URINE CREATININE: CPT

## 2021-01-21 PROCEDURE — 83036 HEMOGLOBIN GLYCOSYLATED A1C: CPT

## 2021-01-21 PROCEDURE — 80061 LIPID PANEL: CPT

## 2021-01-21 PROCEDURE — 82043 UR ALBUMIN QUANTITATIVE: CPT

## 2021-01-21 PROCEDURE — 36415 COLL VENOUS BLD VENIPUNCTURE: CPT

## 2021-01-21 PROCEDURE — 80053 COMPREHEN METABOLIC PANEL: CPT

## 2021-01-25 DIAGNOSIS — E11.8 TYPE 2 DIABETES MELLITUS WITH COMPLICATION (HCC): Primary | ICD-10-CM

## 2021-01-27 DIAGNOSIS — E11.8 TYPE 2 DIABETES MELLITUS WITH COMPLICATION (HCC): ICD-10-CM

## 2021-01-27 RX ORDER — LANCETS 33 GAUGE
EACH MISCELLANEOUS
Qty: 200 EACH | Refills: 1 | Status: SHIPPED | OUTPATIENT
Start: 2021-01-27

## 2021-01-28 ENCOUNTER — CLINICAL SUPPORT (OUTPATIENT)
Dept: INTERNAL MEDICINE CLINIC | Facility: CLINIC | Age: 71
End: 2021-01-28

## 2021-01-28 DIAGNOSIS — E11.8 TYPE 2 DIABETES MELLITUS WITH COMPLICATION (HCC): Primary | ICD-10-CM

## 2021-01-28 NOTE — PATIENT INSTRUCTIONS
Start insulin NPH 12 units in the morning and 10 units in the evening       Call [facility] if you have any low blood sugar readings: [phone]

## 2021-01-28 NOTE — PROGRESS NOTES
[address] [facility] [place], PharmD, BCACP      Reason for visit: Appointment with 65y o  year old for management of T2DM  Current DM Regimen:   Metformin XR 1000mg BID   Toujeo    Currently on prednisone; started 11/16/2020, dose last reduced 12/30/2020; next pulm appt 2/10/2021    ASSESSMENT/PLAN                                                                                     1  Type 2 diabetes: goal A1c < 7 5% based on ADA guidelines  Most recent A1c slightly above goal but patient was started on steroid  SMBG readings fluctuate; highest SMBG readings are in afternoon  x1 hypoglycemia episode of unclear etiology  Tolerating metformin well  Could consider SGLT2i d/t kidney benefits however patient with rx cost concerns   May benefit from NPH insulin d/t cost  According to steroid hyperglycemia management sources, would recommend 30% basal and 70% bolus given multiple steroid dosages however multiple insulin types would increase rx costs  BMP acceptable but with hypoglycemia  No serum B12 while on metformin      Duration: > 20 years  Microvascular complications: microalbuminuria  Macrovascular complications: CABG  (Yes ) Aspirin (No) Statin, allergy x3 statins (Yes ) ACEI/ARB  Eye Exam:  11/2018   Foot Exam: 10/2020     Most recent labs and diabetes goals discussed with patient in clinic today   MEDICATIONS: Discussion with patient on need to meet rx deductible prior to having rx costs reduced; alternatively could try Relion NPH insulin   Patient would prefer NPH insulin d/t reduced upfront cost    o If PCP is in agreeance, will have patient continue metformin 2gm/day and convert to insulin NPH 12 units AM/10 units PM after he uses up Toujelo sample  o Will pend rx for PCP signature/concurrence   SMBG: daily    Follow-Up: 1-2 weeks via phone as patients difficulty leaving house  _x_ Home Monitoring Records, BG      SUBJECTIVE 1  Medication Adherence: Medication list reviewed with patient, reports the following discrepancies/problems:   Richard: has been taking 25 units once daily as instructed; has not self-adjusted dose since last appt   MetFORMIN: has been taking 4 tablets/day   PredniSONE: taking 20mg daily as prescribed    Feels he gets very confused about his medications  Has cost concerns with getting insulin from pharmacy; uncertain if he will be able to afford rx deductible  2  Medication Efficacy:    Review of Systems   Gastrointestinal: Negative for constipation, diarrhea, nausea and vomiting  Ran out of test strips on 1/23/2021; check FBG the day of labs (reading was 60) however patient did not treat this hypoglycemia episode as he knew he was going for labs and he needed to be fasting for labs  Reports he is aware of his SMBG reading based on how his urine comes out; although he has not had any test strips to check BG, he states SMBG reading is WNL as his urine has been normal     Hypoglycemia history: SMBG readings < 70mg/dL x1 since last appt, uncertain why as he does not remember eating reduced portion size the day prior   Unsymptomatic hypoglycemia: No, reports hypoglycemia s/sx x0 since last appt    3  Lifestyle: denies change since last appt  Eats relatively small portion sizes      Breakfast: oatmeal  Lunch: cake + coffee  Supper: 1 bowl rice + protein      OBJECTIVE                                                                                                      SMBG readings (1/14-1/23/2021):  FBG Average: 108 mg/dl ( mg/dl), n=2, 1/2= < 70 mg/dl   Post-Lunch BG Average: 197 mg/dl (134-260 mg/dl), n=2, 0/2= < 70 mg/dl   Pre-Supper BG Average: 199 mg/dl (134-290 mg/dl), n=4, 0/4= < 70 mg/dl   HS BG Average: 193 mg/dl ( mg/dl), n=2, 0/2= < 70 mg/dl     Pertinent Lab Data:  *was fasting for labs*  Lab Results Component Value Date    SODIUM 136 01/21/2021    K 4 5 01/21/2021    CL 99 (L) 01/21/2021    CO2 31 01/21/2021    BUN 28 (H) 01/21/2021    CREATININE 0 91 01/21/2021    GLUC 95 06/04/2019    CALCIUM 9 2 01/21/2021     Lab Results   Component Value Date    HGBA1C 7 8 (H) 01/21/2021     No results found for: [id number]    Microalbumin/Creatinine: 94 (1/2021)    Demographics  Interaction Method: Face to Face  Type of Intervention: Follow-Up    Topic(s) Addressed  Diabetes    Intervention(s) Made    Pharmacologic:      Medication Adjustment - Dose or Frequency    Medication Conversion - Non-Preferred to Preferred: NPH    Non-Pharmacologic:      Other    Tool(s) Used  Not Applicable    Time Spent:   Time Spent in Direct Patient Care: 25 minutes    Time Spent in Care Coordination: 15 minutes    Recommendation(s) Accepted by the Patient/Caregiver:  All Accepted

## 2021-01-28 NOTE — TELEPHONE ENCOUNTER
Per pharmacist encounter on 01/28/21, pending orders for signature/concurrence from DO Iban Roman NPH insulin pens    Please verify rx is being sent to [facility]

## 2021-02-11 ENCOUNTER — CLINICAL SUPPORT (OUTPATIENT)
Dept: INTERNAL MEDICINE CLINIC | Facility: CLINIC | Age: 71
End: 2021-02-11

## 2021-02-11 DIAGNOSIS — E11.8 TYPE 2 DIABETES MELLITUS WITH COMPLICATION (HCC): ICD-10-CM

## 2021-02-11 NOTE — PROGRESS NOTES
[address] [facility] [place], PharmD, BCACP      Reason for visit: Appointment with 65y o  year old for management of T2DM  Current DM Regimen:   Metformin XR 1000mg BID   Insulin NPH 12 units AM/10 units PM    Currently on prednisone; started 2020, dose last reduced 2020; next pulm appt 2021    ASSESSMENT/PLAN                                                                                     1  Type 2 diabetes: goal A1c < 7 5% based on ADA guidelines  Most recent A1c slightly above goal but patient was started on steroid  FBG readings below goal with hypoglycemia; HS BG acceptable  BMP acceptable but with hypoglycemia  No serum B12 while on metformin      Duration: > 20 years  Microvascular complications: microalbuminuria  Macrovascular complications: CABG  (Yes ) Aspirin (No) Statin, allergy x3 statins (Yes ) ACEI/ARB  Eye Exam:  2018   Foot Exam: 10/2020     Most recent labs and diabetes goals discussed with patient in clinic today   MEDICATIONS: If PCP is in agreeance, will have patient continue metformin 2gm/day and adjust to insulin NPH 12 units AM/8 units PM    o Rx updated under CPA   SMB-2x/day    Follow-Up: 2 weeks via phone as patients difficulty leaving house  _x_ Home Monitoring Records, BG      SUBJECTIVE                                                                                                              1  Medication Adherence: Medication list reviewed with patient, reports the following discrepancies/problems:   MetFORMIN: has been taking 4 tablets/day   PredniSONE: has been taking 30mg daily as he felt coughing when he reduced dose to 20mg/day   Insulin: took an additional 10 units last night as HS  d/t eating sushi;     Feels he gets very confused about his medications  Has cost concerns with getting insulin from pharmacy; uncertain if he will be able to afford rx deductible       2  Medication Efficacy:    Review of Systems   Gastrointestinal: Negative for constipation, diarrhea, nausea and vomiting  Hypoglycemia history: SMBG readings < 70mg/dL x2 in the AM; x2 BG 70-80, unclear etiology; FBG low today likely d/t taking more NPH last night   Unsymptomatic hypoglycemia: No, reports hypoglycemia s/sx x0 since last appt even when SMBG readings are < 70    3  Lifestyle: denies change since last appt  Eats relatively small portion sizes  Breakfast: oatmeal  Lunch: cake + coffee  Supper: 1 bowl rice + protein      OBJECTIVE                                                                                                      SMBG readings (2/7-2/11/2021):  FBG Average: 83 mg/dl    HS BG Average: 190 mg/dl; x1 BG > 300      Pertinent Lab Data:  *was fasting for labs*  Lab Results   Component Value Date    SODIUM 136 01/21/2021    K 4 5 01/21/2021    CL 99 (L) 01/21/2021    CO2 31 01/21/2021    BUN 28 (H) 01/21/2021    CREATININE 0 91 01/21/2021    GLUC 95 06/04/2019    CALCIUM 9 2 01/21/2021     Lab Results   Component Value Date    HGBA1C 7 8 (H) 01/21/2021     No results found for: [id number]    Microalbumin/Creatinine: 94 (1/2021)    Demographics  Interaction Method: Phone  Type of Intervention: Follow-Up    Topic(s) Addressed  Diabetes    Intervention(s) Made    Pharmacologic:      Medication Adjustment - Dose or Frequency: NPH    Non-Pharmacologic:      Other    Tool(s) Used  Not Applicable    Time Spent:   Time Spent in Direct Patient Care: 20 minutes    Time Spent in Care Coordination: 15 minutes    Recommendation(s) Accepted by the Patient/Caregiver:  All Accepted

## 2021-02-15 ENCOUNTER — TELEPHONE (OUTPATIENT)
Dept: PULMONOLOGY | Facility: CLINIC | Age: 71
End: 2021-02-15

## 2021-02-15 NOTE — TELEPHONE ENCOUNTER
Patient calling stating that since he started taking prednisone he's been feeling very tired and has been coughing up mucus and believes it's from the prednisone   Please advise [phone]

## 2021-02-24 NOTE — PROGRESS NOTES
Pulmonary Follow Up Note   Akash Feldman 65 y o  male MRN: [id number]  2/26/2021      Assessment:  1  Abnormal CT Chest consistent with Interstitial Lung disease  · DDx includes IPAF, NSIP, vasculitis & HP - less likely IPF, sarcoid, malignancy   · Extensive serology evaluation negative except for pANCA 1:40 - unclear significance no other evidence of vasculitis  · Bronchoscopy with negative cultures and noted mixed neutrophils/macrophages  · Changes in Spirometry value percentages due to variation in NHANESIII and [id number]  · Severely reduced DLCO  · VBS negative for aspiration  · He has completed 3+ months of systemic steroids  · Will remain off prednisone for now, recheck PFTs and CXR in 4-6 weeks  · May need to consider resuming steroids and adding steroid sparing agent (cellcept/imuran etc) if inflammatory findings return  · Given bronchospasm symptoms, resume ICS/LABA with Wixela  · He declines all vaccines at this time, will address again in future     2  Bronchiectasis - as listed above, Resume ICS/LABA dosing     3  Mitral regurgitation - no evidence of volume overload/CHF on exam today     4  Diabetes - per PCP     5  Severely reduced diffusion  Plan:    Diagnoses and all orders for this visit:    Interstitial lung disease ([facility] [address] )  -     Complete PFT without post bronchodilator; Future  -     XR chest pa & lateral; Future    Bronchiectasis without complication (HCC)  -     fluticasone-salmeterol (Wixela Inhub) 250-50 mcg/dose inhaler; Inhale 1 puff 2 (two) times a day Rinse mouth after use  Abnormal chest CT    Diabetes 1 5, managed as type 2 ([facility] [address] )        Return in about 3 months (around 5/26/2021) for Recheck  History of Present Illness   HPI:  Akash Feldman is a 65 y o  male who has CAD post CABG, DM2, Prior Polio syndrome, with abnormal CT chest with bronchiectasis and left sided GGOs   He was initially seen in June 2016 and has been followed with serial radiographs  During televisit in May 2020 with Dr Bailee Jefferson a repeat CT chest was obtained with some progression  In July 2020 further evaluation was planned including serology, VBS, HRCT, and PFTs as listed below  He underwent bronchoscopy in Oct 2020  He was last seen in November 2020 and started on systemic steroids with prednisone 40mg daily  He had repeat HRCT chest in late Dec 2020 and recommended to start prednisone weaning  He presents today for follow up      He reports he has since weaned off steroids over past two weeks  He was noticing increased arthralgias and edema  This has resolved since stopping prednisone  He stopped Breo due to cost  He was having cough with blood streaked sputum but this has resolved  He denied dyspnea, no fevers or chills, no chest pain, no rashes, no inflammatory arthralgias, no oral lesions        Additional Social history  - remote smoker quit [address] [place] [address], immigrated after war, Agent Orange exposure  - [employer] - retired  - Negative PPD in past     Review of Systems   Constitutional: Positive for activity change  Negative for chills, fatigue, fever and unexpected weight change  HENT: Negative for mouth sores, sore throat and voice change  Respiratory: Positive for cough  Negative for chest tightness, shortness of breath and wheezing  Cardiovascular: Positive for leg swelling  Negative for chest pain and palpitations  Gastrointestinal: Negative for abdominal pain, nausea and vomiting  Endocrine: Positive for cold intolerance  Musculoskeletal: Positive for arthralgias, back pain and myalgias  Negative for joint swelling  Skin: Negative for rash  Neurological: Negative for headaches  Hematological: Negative for adenopathy  Psychiatric/Behavioral: Negative for sleep disturbance  The patient is not nervous/anxious          Historical Information   Past Medical History:   Diagnosis Date    3-vessel CAD     Anxiety     CAP (community acquired pneumonia)     Last assessed - 3/15/16    Cardiac murmur     Colon polyp     Diabetes mellitus ([facility] [address] )     Disease of thyroid gland     History of poliomyelitis     Hyperlipidemia     Hypertension     Methicillin resistant Staphylococcus aureus infection     Mitral valve insufficiency     [facility]     Nonspecific abnormal results of function study of thyroid     Last assessed - 10/15/14     Past Surgical History:   Procedure Laterality Date    CARDIAC SURGERY  1997    COLONOSCOPY      CORONARY ARTERY BYPASS GRAFT      x3; Last assessed - 9/29/17    AK COLONOSCOPY FLX DX W/COLLJ SPEC WHEN PFRMD N/A 3/27/2019    Procedure: COLONOSCOPY;  Surgeon: Chana Stacy MD;  Location: [facility];   Service: Gastroenterology     Family History   Adopted: Yes   Problem Relation Age of Onset    Diabetes Family     No Known Problems Mother          Meds/Allergies     Current Outpatient Medications:     albuterol (Ventolin HFA) 90 mcg/act inhaler, Inhale 2 puffs every 6 (six) hours as needed for wheezing, Disp: 18 Inhaler, Rfl: 1    ascorbic acid (VITAMIN C) 500 MG tablet, Take 500 mg by mouth daily, Disp: , Rfl:     aspirin (ECOTRIN) 325 mg EC tablet, Take 1 tablet by mouth daily, Disp: , Rfl:     diltiazem (CARDIZEM CD) 240 mg 24 hr capsule, TAKE ONE CAPSULE BY MOUTH EVERY DAY, Disp: 90 capsule, Rfl: 3    ezetimibe (ZETIA) 10 mg tablet, Take 1 tablet (10 mg total) by mouth daily, Disp: 30 tablet, Rfl: 11    glucose blood test strip, Use 1 each 2 (two) times a day Use as instructed, Disp: 200 each, Rfl: 1    guaiFENesin (ROBITUSSIN) 100 MG/5ML oral liquid, Take 100 mg by mouth 2 (two) times a day, Disp: , Rfl:     hydrocortisone (ANUSOL-HC) 2 5 % rectal cream, INSERT INTO THE RECTUM TWO TIMES A DAY, Disp: 30 g, Rfl: 4    levothyroxine 50 mcg tablet, TAKE 1 TABLET BY MOUTH EVERY DAY, Disp: 90 tablet, Rfl: 1    losartan (COZAAR) 50 mg tablet, TAKE 1 TABLET BY MOUTH EVERY DAY, Disp: 90 tablet, Rfl: 3    metFORMIN (GLUCOPHAGE-XR) 500 mg 24 hr tablet, Take 2 tablets (1,000 mg total) by mouth 2 (two) times a day For diabetes, Disp: 360 tablet, Rfl: 1    Omega-3 Fatty Acids (FISH OIL) 1,000 mg, Take 1 capsule by mouth daily, Disp: , Rfl:     OneTouch Delica Lancets 34H MISC, Use twice daily, Disp: 200 each, Rfl: 1    benzonatate (TESSALON PERLES) 100 mg capsule, Take 1 capsule (100 mg total) by mouth daily at bedtime as needed for cough (Patient not taking: Reported on 2/25/2021), Disp: 30 capsule, Rfl: 0    diazepam (VALIUM) 5 mg tablet, Take 1 tablet (5 mg total) by mouth every 8 (eight) hours as needed for anxiety (anxiety) (Patient not taking: Reported on 2/25/2021), Disp: 90 tablet, Rfl: 1    fluticasone-salmeterol (Wixela Inhub) 250-50 mcg/dose inhaler, Inhale 1 puff 2 (two) times a day Rinse mouth after use , Disp: 3 Inhaler, Rfl: 1    insulin isophane (HumuLIN N,NovoLIN N) 100 units/mL injection pen, Inject 12 units in the morning and 8 units in the evening For diabetes - dispense Relion Pens (Patient not taking: Reported on 2/26/2021), Disp: 5 pen, Rfl: 2    Insulin Pen Needle (B-D UF III MINI PEN NEEDLES) 31G X 5 MM MISC, by Does not apply route daily USE WITH BASAGLAR (Patient not taking: Reported on 2/26/2021), Disp: 100 each, Rfl: 5    predniSONE 20 mg tablet, TAKE 2 TABLETS BY MOUTH EVERY DAY (Patient not taking: No sig reported), Disp: 120 tablet, Rfl: 0    sildenafil (REVATIO) 20 mg tablet, TAKE 1 TABLET BY MOUTH EVERY DAY AS NEEDED (Patient not taking: Reported on 2/26/2021), Disp: 90 tablet, Rfl: 1    VITAMIN B COMPLEX-C PO, Take 1 tablet by mouth daily, Disp: , Rfl:     Current Facility-Administered Medications:     albuterol (ACCUNEB) nebulizer solution 1 25 mg, 1 25 mg, Nebulization, Q6H PRN, Alex Malone MD  Allergies   Allergen Reactions    Simvastatin Rash    Atorvastatin Itching     Other reaction(s): Itching    Levofloxacin GI Intolerance     Other reaction(s): GI Upset    Pravastatin Itching     Other reaction(s): Itching       Vitals: Blood pressure 120/60, pulse 84, temperature (!) 96 6 °F (35 9 °C), temperature source Temporal, height 5' 7" (1 702 m), weight 49 4 kg (109 lb), SpO2 96 %  Body mass index is 17 07 kg/m²  Oxygen Therapy  SpO2: 96 %  Oxygen Therapy: None (Room air)      Physical Exam  Physical Exam  Vitals signs reviewed  Constitutional:       General: He is not in acute distress  Appearance: Normal appearance  He is well-developed  He is not ill-appearing, toxic-appearing or diaphoretic  Comments: Thin older man, nontoxic    HENT:      Head: Normocephalic and atraumatic  Right Ear: External ear normal       Left Ear: External ear normal       Nose: Nose normal       Mouth/Throat:      Mouth: Mucous membranes are moist       Pharynx: Oropharynx is clear  No oropharyngeal exudate  Comments: No thrush  Eyes:      General: No scleral icterus  Right eye: No discharge  Left eye: No discharge  Conjunctiva/sclera: Conjunctivae normal       Pupils: Pupils are equal, round, and reactive to light  Neck:      Musculoskeletal: Neck supple  Vascular: No JVD  Trachea: No tracheal deviation  Cardiovascular:      Rate and Rhythm: Normal rate and regular rhythm  Heart sounds: Normal heart sounds  No murmur  Pulmonary:      Effort: Pulmonary effort is normal  No respiratory distress  Breath sounds: No stridor  Rales present  No wheezing or rhonchi  Comments: Left basilar rales noted, no wheeze, no rhonchi  Abdominal:      General: Bowel sounds are normal  There is no distension  Palpations: Abdomen is soft  Tenderness: There is no abdominal tenderness  There is no guarding  Musculoskeletal:         General: No swelling or deformity  Right lower leg: No edema  Left lower leg: No edema  Comments: Poor muscle mass   Lymphadenopathy:      Cervical: No cervical adenopathy     Skin:     General: Skin is warm and dry  Findings: No rash  Neurological:      General: No focal deficit present  Mental Status: He is alert and oriented to person, place, and time  Psychiatric:         Mood and Affect: Mood normal          Behavior: Behavior normal          Thought Content: Thought content normal          Labs: I have personally reviewed pertinent lab results    Lab Results   Component Value Date    WBC 7 59 08/27/2020    HGB 14 7 08/27/2020    HCT 43 8 08/27/2020    MCV 90 08/27/2020     08/27/2020     Lab Results   Component Value Date    GLUCOSE 112 01/09/2014    CALCIUM 9 2 01/21/2021     (L) 01/09/2014    K 4 5 01/21/2021    CO2 31 01/21/2021    CL 99 (L) 01/21/2021    BUN 28 (H) 01/21/2021    CREATININE 0 91 01/21/2021     Lab Results   Component Value Date    IGE 75 2 08/27/2020     Lab Results   Component Value Date    ALT 47 01/21/2021    AST 20 01/21/2021    ALKPHOS 54 01/21/2021    BILITOT 0 3 07/31/2014     Bronchoscopy Lingula BAL 10/16/2020  Cultures negative (Fungal, AFB, bacterial culture, viral culture)  Cytology neg for malignancy, Cell Count 50% macrophages, 40% neutrophils, 10% lymphocytes     8/2020 - PRADEEP neg, CK mb 2 6, SSa/b neg, CRP <3, AGBM neg, C Anca neg, pANCA 1:40, GA-3 neg, MPO neg, RF neg, JORJE neg, Scl-70 neg, HP panel neg, IgG 1770, IgM 311, IgE 75, NE RAST (+) trees, ragweed, birch, COVID-19 antibodies negative     3/2018 RA panel negative, CCP 4     Imaging and other studies: New images and reports personally reviewed   12/26/2020 - HRCT Chest - severe bronchiectasis in LLL and to lesser extent on RLL, significant improving in GGOs, no sig mediastinal adenopathy, no effusions    9/25/2020 - HRCT Chest - continued bronchiectasis and honeycombing mostly in LLL, progressive GGOs in left and RUL, enlarged main PA     9/10/2020 - VBS - normal oral and pharyngeal stages of swallowing      5/18/2020 - noted continued LLL bronchiectasis and to lesser degree on right with some increase in peripheral ground glass component and interstitial thickening within MONICO and mild GGO in RUL, trace left effusion, mild mediastinal adenopathy     HRCT Chest 17 - no change in left lower lobe bronchiectasis and surrounding ground glass opacities, mild basilar segmental atelectasis, no air trapping, mild stable MONICO GGOs, no ILS thickening, no effusions     Pulmonary function testin2020 - Ratio 74%, FVC 2 05 (54%), FEV1 1 52L (55%), TLC 60%, RV 75%, DLCO 24% - moderate obstructive airflow defect, reduced lung volumes, severely reduced diffusion     2020 - Ratio 80%, FVC 2 09L (66%), FEV1 1 67L (72%) - moderate restrictive airflow defect     18 - Spirometry Ratio 69%, FVC 2 33L (72%), FEV1 1 61L (68%) - mild restrictive airflow defect     17 - Ratio 74%, FVC 2 55L (78%), FEV1 1 89L (79%) - very mild restrictive ventilatory defect with prior FVC 82% predicted in       Ambulation Testing  2020 - 6MWT on RA - total walk distance 252 meters, initial SpO2 92%, gus 90%, at conclusion 92%, initial HR 84bpm, maximal HR 89bpm     19 - Ambulation Testing - On RA - SpO2 91%, ambulated nearly 50 meters with SpO2 increased to 97% - ambulation limited with knee brace and cane     EKG, Pathology, and Other Studies: I have personally reviewed pertinent reports     TTE 2017 - EF 60%, normal RV size/function, mild TR, normal PA pressures     TTE 2016 EF 82%, grade I diastolic dysfunction, normal RV, mild-mod MR with eccentric posterior jet    Javon Jurado DO, [facility]

## 2021-02-25 ENCOUNTER — CLINICAL SUPPORT (OUTPATIENT)
Dept: INTERNAL MEDICINE CLINIC | Facility: CLINIC | Age: 71
End: 2021-02-25

## 2021-02-25 DIAGNOSIS — E13.9 DIABETES 1.5, MANAGED AS TYPE 2 (HCC): Primary | ICD-10-CM

## 2021-02-25 DIAGNOSIS — I10 ESSENTIAL HYPERTENSION: ICD-10-CM

## 2021-02-25 DIAGNOSIS — Z79.899 POLYPHARMACY: ICD-10-CM

## 2021-02-25 NOTE — PROGRESS NOTES
[address] [facility] Roxane, PharmD, BCACP      Reason for visit: Appointment with 65y o  year old for management of T2DM  Current DM Regimen:   Metformin XR 1000mg BID   Insulin NPH 12 units AM/10 units PM    Currently on prednisone; started 2020, dose last reduced 2020; next pul appt 2021    ASSESSMENT/PLAN                                                                                     1  Type 2 diabetes: goal A1c < 7 5% based on ADA guidelines  Most recent A1c slightly above goal but patient was started on steroid  SMBG readings to goal since patient stopped steroid/insulin  Uncertain if pulm will have patient restart steroid  BMP acceptable but with hypoglycemia  No serum B12 while on metformin      Duration: > 20 years  Microvascular complications: microalbuminuria  Macrovascular complications: CABG  (Yes ) Aspirin (No) Statin, allergy x3 statins (Yes ) ACEI/ARB  Eye Exam:  2018   Foot Exam: 10/2020     Most recent labs and diabetes goals discussed with patient in clinic today   MEDICATIONS: If PCP is in agreeance, will have patient continue metformin 2gm/day  Continue to hold insulin if patient will not resume steroid   SMB-2x/day    Message sent to pulm provider to inform on rx discrepancies as noted below    Patient agrees to inform PCP/PharmD if SBP < 110  Follow-Up: 3 weeks via phone as patients difficulty leaving house; may r/s for sooner if patient will restart steroid  _x_ Home Monitoring Records, BG      SUBJECTIVE                                                                                                              1  Medication Adherence: Medication list reviewed with patient, reports the following discrepancies/problems:   MetFORMIN: has been taking 4 tablets/day   PredniSONE: has not taken x2 weeks as it was causing significant water retention   Has been coughing less since stopping; no mucus since stopping either   Insulin NPH: has not been taking for the past week d/t hypoglycemia when he was taking insulin but not taking steroid   Albuterol: only takes prior to bed as he doesn't have SOB during day   Breo: stopped as instructed by pulmonary d/t rx costs   Benzonatate: does not feel like rx helped, Robitussin helped,    Robitussin: currently taking 1/2 cup in AM/HS, feels this alleviated cough s/sx better than benzonatate   Vit C: stopped ~1 week ago   Vit B: stopped ~1 week ago    2  Medication Efficacy:    Review of Systems   Constitutional:        Increased weakness and knee pain since last appt but this has started to improve since stopping prednisone; denies falls  Cardiovascular: Positive for leg swelling (improving since stopping prednisone)  Gastrointestinal: Negative for constipation, diarrhea, nausea and vomiting  Hypoglycemia history: denies SMBG readings < 70mg/dL since stopping insulin   Unsymptomatic hypoglycemia: No, reports hypoglycemia s/sx x0 since stopping insulin   Experienced frequent hypoglycemia when he was taking insulin after he stopped taking prednisone  Checks BP daily, reports it usually avgs 120/80  3  Lifestyle: for a couple weeks he has not been feeling good and consequently has not been eating large amounts but states appetite is starting to improve  Eats relatively small portion sizes      Breakfast: oatmeal + sunflower seeds + honey  Lunch: cake + coffee  Supper: 1 bowl rice + protein    OBJECTIVE                                                                                                      SMBG readings: (since stopping insulin)  FBG Average: 155 mg/dl (155-155 mg/dl), n=1, 0/1= < 70 mg/dl   Pre-Lunch BG Average: 141 mg/dl (100-199 mg/dl), n=6, 0/6= < 70 mg/dl     Pertinent Lab Data:  *was fasting for labs*  Lab Results   Component Value Date    SODIUM 136 01/21/2021    K 4 5 01/21/2021    CL 99 (L) 01/21/2021    CO2 31 01/21/2021    BUN 28 (H) 01/21/2021    CREATININE 0 91 01/21/2021    GLUC 95 06/04/2019    CALCIUM 9 2 01/21/2021     Lab Results   Component Value Date    HGBA1C 7 8 (H) 01/21/2021     No results found for: Juni Penny    Microalbumin/Creatinine: 94 (1/2021)    Demographics  Interaction Method: Phone  Type of Intervention: Follow-Up    Topic(s) Addressed  Diabetes  Hypertension  Polypharmacy    Intervention(s) Made      Non-Pharmacologic:  Adherence Addressed    Other    Tool(s) Used  Not Applicable    Time Spent:   Time Spent in Direct Patient Care: 25 minutes    Time Spent in Care Coordination: 20 minutes    Recommendation(s) Accepted by the Patient/Caregiver: Partially Accepted

## 2021-02-26 ENCOUNTER — OFFICE VISIT (OUTPATIENT)
Dept: PULMONOLOGY | Facility: CLINIC | Age: 71
End: 2021-02-26
Payer: MEDICARE

## 2021-02-26 ENCOUNTER — TELEPHONE (OUTPATIENT)
Dept: PULMONOLOGY | Facility: CLINIC | Age: 71
End: 2021-02-26

## 2021-02-26 VITALS
OXYGEN SATURATION: 96 % | WEIGHT: 109 LBS | TEMPERATURE: 96.6 F | DIASTOLIC BLOOD PRESSURE: 60 MMHG | BODY MASS INDEX: 17.11 KG/M2 | HEART RATE: 84 BPM | HEIGHT: 67 IN | SYSTOLIC BLOOD PRESSURE: 120 MMHG

## 2021-02-26 DIAGNOSIS — R93.89 ABNORMAL CHEST CT: ICD-10-CM

## 2021-02-26 DIAGNOSIS — J47.9 BRONCHIECTASIS WITHOUT COMPLICATION (HCC): ICD-10-CM

## 2021-02-26 DIAGNOSIS — E13.9 DIABETES 1.5, MANAGED AS TYPE 2 (HCC): ICD-10-CM

## 2021-02-26 DIAGNOSIS — J84.9 INTERSTITIAL LUNG DISEASE (HCC): Primary | ICD-10-CM

## 2021-02-26 PROCEDURE — 99215 OFFICE O/P EST HI 40 MIN: CPT | Performed by: INTERNAL MEDICINE

## 2021-02-26 NOTE — PATIENT INSTRUCTIONS
· Remain off prednisone  · Start Wixela 1puff twice a day  · Get Chest Xray and breathing tests in 1 month  · Continue robitussin as needed for cough

## 2021-02-26 NOTE — TELEPHONE ENCOUNTER
Patient calling stating the Ricardokesuzi Mason is to expensive and will cost $[phone] which is more  then the [facility] cost   Patient would like know if there's any cheaper inhaler that could be prescribed    [phone]

## 2021-03-04 DIAGNOSIS — J47.9 BRONCHIECTASIS WITHOUT COMPLICATION (HCC): Primary | ICD-10-CM

## 2021-03-04 RX ORDER — BUDESONIDE AND FORMOTEROL FUMARATE DIHYDRATE 160; 4.5 UG/1; UG/1
2 AEROSOL RESPIRATORY (INHALATION) 2 TIMES DAILY
Qty: 3 INHALER | Refills: 3 | Status: SHIPPED | OUTPATIENT
Start: 2021-03-04 | End: 2022-03-04

## 2021-03-09 NOTE — TELEPHONE ENCOUNTER
Spoke with pt, informed him of medication change and patient assistance program  Pt will fill out application online and fax to office to be sent out  Will wait for pt's e-mail

## 2021-03-18 ENCOUNTER — CLINICAL SUPPORT (OUTPATIENT)
Dept: INTERNAL MEDICINE CLINIC | Facility: CLINIC | Age: 71
End: 2021-03-18

## 2021-03-18 DIAGNOSIS — E13.9 DIABETES 1.5, MANAGED AS TYPE 2 (HCC): Primary | ICD-10-CM

## 2021-03-18 DIAGNOSIS — E11.8 TYPE 2 DIABETES MELLITUS WITH COMPLICATION (HCC): ICD-10-CM

## 2021-03-18 NOTE — PROGRESS NOTES
[address] [facility] Roxane, PharmD, BCACP      Reason for visit: Appointment with 65y o  year old for management of T2DM  Current DM Regimen:   Metformin XR 1000mg BID   Insulin NPH 12 units AM/10 units PM      ASSESSMENT/PLAN                                                                                     1  Type 2 diabetes: goal A1c < 7 5% based on ADA guidelines  Most recent A1c slightly above goal but patient was started on steroid  SMBG readings to goal but few readings to review; no hypoglycemia  Patient on lower end of goal  May not need insulin as he is no longer on steroid  BMP acceptable but with hypoglycemia  No serum B12 while on metformin      Duration: > 20 years  Microvascular complications: microalbuminuria  Macrovascular complications: CABG  (Yes ) Aspirin (No) Statin, allergy x3 statins (Yes ) ACEI/ARB  Eye Exam:  2018   Foot Exam: 10/2020     Most recent labs and diabetes goals discussed with patient in clinic today   MEDICATIONS: Discussion on stopping insulin but patient would prefer to conitnue  If PCP is in agreeance, will have patient continue metformin 2gm/day; adjust insulin to 10 units noon and 6 units PM    SMBx/day    Follow-Up: 3 weeks via phone as patients difficulty leaving house; may r/s for sooner if patient will restart steroid  _x_ Home Monitoring Records, BG      SUBJECTIVE                                                                                                              1  Medication Adherence: Medication list reviewed with patient, reports the following discrepancies/problems:   MetFORMIN: has been taking 4 tablets/day   PredniSONE: no longer taking   Insulin NPH: restarted 2-3 weeks ago   12 units noon/8 units PM  Wakes up at 10AM     Albuterol: only takes prior to bed as he doesn't have SOB during day   Benzonatate: does not feel like rx helped, Robitussin helped,    Robitussin: currently taking 1/2 cup in AM/HS, feels this alleviated cough s/sx better than benzonatate   Symbicort: awaiting PAP paperwork    2  Medication Efficacy:    Review of Systems   Respiratory: Negative for cough and shortness of breath  Cardiovascular: Negative for leg swelling (improving since stopping prednisone)  Gastrointestinal: Negative for constipation, diarrhea, nausea and vomiting  Musculoskeletal: Positive for back pain (still somewhat present since stopping prednisone)  Still has some leg weakness since stopping prednisone     Hypoglycemia history: denies SMBG readings < 70mg/dL since stopping insulin   Unsymptomatic hypoglycemia: No, reports hypoglycemia s/sx x0 since stopping insulin   Experienced frequent hypoglycemia when he was taking insulin after he stopped taking prednisone  3  Lifestyle: appetite is back to normal  Eats relatively small portion sizes      Breakfast: oatmeal + sunflower seeds + honey  Lunch: cake + coffee  Supper: 1 bowl rice + protein    OBJECTIVE                                                                                                      SMBG readings: (since stopping insulin)  FB, 83, 84, 75    Pertinent Lab Data:  *was fasting for labs*  Lab Results   Component Value Date    SODIUM 136 2021    K 4 5 2021    CL 99 (L) 2021    CO2 31 2021    BUN 28 (H) 2021    CREATININE 0 91 2021    GLUC 95 2019    CALCIUM 9 2 2021     Lab Results   Component Value Date    HGBA1C 7 8 (H) 2021     No results found for: [id number]    Microalbumin/Creatinine: 94 (2021)    Demographics  Interaction Method: Phone  Type of Intervention: Follow-Up    Topic(s) Addressed  Diabetes  Polypharmacy    Intervention(s) Made    Pharmacologic:      Medication Adjustment - Dose or Frequency: insulin    Non-Pharmacologic:  Adherence Addressed    Other    Tool(s) Used  Not Applicable    Time Spent:   Time Spent in Direct Patient Care: 25 minutes    Time Spent in Care Coordination: 20 minutes    Recommendation(s) Accepted by the Patient/Caregiver: Partially Accepted

## 2021-03-26 ENCOUNTER — HOSPITAL ENCOUNTER (OUTPATIENT)
Dept: RADIOLOGY | Facility: HOSPITAL | Age: 71
Discharge: HOME | End: 2021-03-26
Attending: INTERNAL MEDICINE
Payer: MEDICARE

## 2021-03-26 ENCOUNTER — HOSPITAL ENCOUNTER (OUTPATIENT)
Dept: PULMONOLOGY | Facility: HOSPITAL | Age: 71
Discharge: HOME | End: 2021-03-26
Attending: INTERNAL MEDICINE
Payer: MEDICARE

## 2021-03-26 DIAGNOSIS — J84.9 INTERSTITIAL LUNG DISEASE (HCC): ICD-10-CM

## 2021-03-26 PROCEDURE — 94726 PLETHYSMOGRAPHY LUNG VOLUMES: CPT

## 2021-03-26 PROCEDURE — 94010 BREATHING CAPACITY TEST: CPT | Performed by: INTERNAL MEDICINE

## 2021-03-26 PROCEDURE — 94729 DIFFUSING CAPACITY: CPT

## 2021-03-26 PROCEDURE — 94760 N-INVAS EAR/PLS OXIMETRY 1: CPT

## 2021-03-26 PROCEDURE — 94010 BREATHING CAPACITY TEST: CPT

## 2021-03-26 PROCEDURE — 71046 X-RAY EXAM CHEST 2 VIEWS: CPT

## 2021-03-26 PROCEDURE — 94726 PLETHYSMOGRAPHY LUNG VOLUMES: CPT | Performed by: INTERNAL MEDICINE

## 2021-03-26 PROCEDURE — 94729 DIFFUSING CAPACITY: CPT | Performed by: INTERNAL MEDICINE

## 2021-04-05 ENCOUNTER — TELEPHONE (OUTPATIENT)
Dept: INTERNAL MEDICINE CLINIC | Facility: CLINIC | Age: 71
End: 2021-04-05

## 2021-04-05 ENCOUNTER — CLINICAL SUPPORT (OUTPATIENT)
Dept: INTERNAL MEDICINE CLINIC | Facility: CLINIC | Age: 71
End: 2021-04-05

## 2021-04-05 DIAGNOSIS — J84.9 INTERSTITIAL LUNG DISEASE (HCC): Primary | ICD-10-CM

## 2021-04-05 DIAGNOSIS — J84.9 INTERSTITIAL LUNG DISEASE (HCC): ICD-10-CM

## 2021-04-05 DIAGNOSIS — E11.8 TYPE 2 DIABETES MELLITUS WITH COMPLICATION (HCC): Primary | ICD-10-CM

## 2021-04-05 NOTE — PROGRESS NOTES
[address] [facility] Roxane, PharmD, BCACP      Reason for visit: Appointment with 65y o  year old for management of T2DM  Current DM Regimen:   Metformin XR 1000mg BID   Insulin NPH 12 units AM/10 units PM      ASSESSMENT/PLAN                                                                                     1  Type 2 diabetes: goal A1c < 7 5% based on ADA guidelines; could also consider < 8%  Most recent A1c slightly above goal but patient was started on steroid  FBG readings on lower end of goal, some below goal; pre-supper BG acceptable  Patient denies hypoglycemia s/sx  BMP acceptable but with hypoglycemia  No serum B12 while on metformin      Duration: > 20 years  Microvascular complications: microalbuminuria  Macrovascular complications: CABG  (Yes ) Aspirin (No) Statin, allergy x3 statins (Yes ) ACEI/ARB  Eye Exam:  2018   Foot Exam: 10/2020     Most recent labs and diabetes goals discussed with patient in clinic today   MEDICATIONS: If PCP is in agreeance, will have patient continue metformin 2gm/day; adjust insulin to 10 units noon and 4 units PM    SMBx/day; patient will write down readings on calendar 1-2 weeks prior to appt  Will pend SW referral for patient to work with SW on PAP application  Follow-Up: 6 weeks via phone as patients difficulty leaving house; may r/s for sooner if patient will restart steroid  _x_ Home Monitoring Records, BG      SUBJECTIVE                                                                                                              1  Medication Adherence: Medication list reviewed with patient, reports the following discrepancies/problems:   MetFORMIN: has been taking 4 tablets/day   Insulin NPH: restarted 2-3 weeks ago   12 units noon/8 units PM  Wakes up at 10AM     Albuterol: taking as prescribed   Benzonatate: does not feel like rx helped, Robitussin helped,    Robitussin: currently taking 1/2 cup in AM/HS, feels this alleviated cough s/sx better than benzonatate   Symbicort: has not filled out PAP paperwork as he was uncertain why they were asking for credit card number   Breo: has inhaler from previous supply (?) but takes 1 puff daily    2  Medication Efficacy:    Review of Systems   Respiratory: Negative for cough and shortness of breath  Cardiovascular: Negative for leg swelling  Gastrointestinal: Negative for constipation, diarrhea, nausea and vomiting  Musculoskeletal: Positive for back pain (still somewhat present since stopping prednisone)  Still has some leg weakness since stopping prednisone     Hypoglycemia history: denies SMBG readings < 70mg/dL since stopping insulin   Unsymptomatic hypoglycemia: No, reports hypoglycemia s/sx x0 since last appt    3  Lifestyle: appetite is back to normal  Eats relatively small portion sizes      Breakfast: oatmeal + sunflower seeds + honey  Lunch: cake + coffee  Supper: 1 bowl rice + protein    OBJECTIVE                                                                                                    FBG Average: 99 mg/dl ( mg/dl), n=15, 0/15= < 70 mg/dl   Pre-Supper BG Average: 163 mg/dl ( mg/dl), n=10, 0/10= < 70 mg/dl     Pertinent Lab Data:  *was fasting for labs*  Lab Results   Component Value Date    SODIUM 136 01/21/2021    K 4 5 01/21/2021    CL 99 (L) 01/21/2021    CO2 31 01/21/2021    BUN 28 (H) 01/21/2021    CREATININE 0 91 01/21/2021    GLUC 95 06/04/2019    CALCIUM 9 2 01/21/2021     Lab Results   Component Value Date    HGBA1C 7 8 (H) 01/21/2021     No results found for: [id number]    Microalbumin/Creatinine: 94 (1/2021)    Demographics  Interaction Method: Phone  Type of Intervention: Follow-Up    Topic(s) Addressed  Diabetes  Polypharmacy    Intervention(s) Made    Pharmacologic:      Medication Adjustment - Dose or Frequency: insulin    Non-Pharmacologic:  Adherence Addressed    Other    Tool(s) Used  Not Applicable    Time Spent:   Time Spent in Direct Patient Care: 30 minutes    Time Spent in Care Coordination: 20 minutes    Recommendation(s) Accepted by the Patient/Caregiver:  All Accepted

## 2021-04-05 NOTE — TELEPHONE ENCOUNTER
Per pharmacist encounter on 04/05/21, [facility]

## 2021-04-06 ENCOUNTER — PATIENT OUTREACH (OUTPATIENT)
Dept: INTERNAL MEDICINE CLINIC | Facility: CLINIC | Age: 71
End: 2021-04-06

## 2021-04-06 ENCOUNTER — PATIENT OUTREACH (OUTPATIENT)
Dept: CASE MANAGEMENT | Facility: HOSPITAL | Age: 71
End: 2021-04-06

## 2021-04-06 DIAGNOSIS — Z79.899 USES NEBULIZER AND INHALER AT HOME: Primary | ICD-10-CM

## 2021-04-06 NOTE — PROGRESS NOTES
Patient was contacted as per referral from [address] to assist with application for Symbicort  Radha was prescribed Symbicort by Dr Marsha Dickey as the most affordable Long acting inhaler, based on his insurance  Radha speaks [place] but it is his second language next to [place]  He doesn't catch every word and it helps to speak slowly  He had recently gone to the [facility] to complete an application, but was asked for a credit card, which he gave to the   After feeling uneasy, he called to cancel the order  He was uncertain as to the reason for the charges  I spoke to him about the [id number] [facility] pharmaceutical assistance application that the Pulmonary office MA Freda Melton asked him to complete in Feb/March  He did not complete it at that time due to some confusion  We walked through the process, step by step, and the application will be sent to [facility] by email for a signature  He will email back to me and I will forward to Freda Melton for completion and Dr Marsha Dickey signature  [facility] has one sample of Breo left that he will continue to use  We discussed the importance of the long acting medication  This message will be routed to Freda Melton and we will attempt to expedite when the forms are complete

## 2021-04-06 NOTE — PROGRESS NOTES
Debbie message received from Pharmacist YUSRA CASTILLO with request for [facility] SW to assist patient with inhaler PAP (Symbicort)  Outreached OP Respiratory Therapist Leigha Hernandez for assistance with this  Referral placed and OPCM SW will remain available to assist as needed

## 2021-04-12 NOTE — PROGRESS NOTES
LVM to pt to get update on application, give samples of Breo, in the meantime, until application process is completed  Also to discuss him possibly just coming to the office to fill out application

## 2021-04-13 ENCOUNTER — EVALUATION (OUTPATIENT)
Dept: PHYSICAL THERAPY | Facility: CLINIC | Age: 71
End: 2021-04-13
Payer: MEDICARE

## 2021-04-13 ENCOUNTER — OFFICE VISIT (OUTPATIENT)
Dept: INTERNAL MEDICINE CLINIC | Facility: CLINIC | Age: 71
End: 2021-04-13
Payer: MEDICARE

## 2021-04-13 ENCOUNTER — PATIENT OUTREACH (OUTPATIENT)
Dept: CASE MANAGEMENT | Facility: HOSPITAL | Age: 71
End: 2021-04-13

## 2021-04-13 VITALS
WEIGHT: 106.8 LBS | RESPIRATION RATE: 16 BRPM | BODY MASS INDEX: 17.16 KG/M2 | OXYGEN SATURATION: 92 % | HEART RATE: 77 BPM | SYSTOLIC BLOOD PRESSURE: 108 MMHG | DIASTOLIC BLOOD PRESSURE: 62 MMHG | HEIGHT: 66 IN

## 2021-04-13 DIAGNOSIS — R29.898 RIGHT LEG WEAKNESS: Primary | ICD-10-CM

## 2021-04-13 DIAGNOSIS — E11.8 TYPE 2 DIABETES MELLITUS WITH COMPLICATION (HCC): ICD-10-CM

## 2021-04-13 DIAGNOSIS — E78.5 DYSLIPIDEMIA: ICD-10-CM

## 2021-04-13 DIAGNOSIS — R29.898 RIGHT LEG WEAKNESS: ICD-10-CM

## 2021-04-13 DIAGNOSIS — Z00.00 MEDICARE ANNUAL WELLNESS VISIT, SUBSEQUENT: ICD-10-CM

## 2021-04-13 DIAGNOSIS — Z23 ENCOUNTER FOR IMMUNIZATION: Primary | ICD-10-CM

## 2021-04-13 DIAGNOSIS — J84.9 INTERSTITIAL LUNG DISEASE (HCC): ICD-10-CM

## 2021-04-13 PROCEDURE — 97110 THERAPEUTIC EXERCISES: CPT | Performed by: PHYSICAL THERAPIST

## 2021-04-13 PROCEDURE — 99214 OFFICE O/P EST MOD 30 MIN: CPT | Performed by: INTERNAL MEDICINE

## 2021-04-13 PROCEDURE — 97162 PT EVAL MOD COMPLEX 30 MIN: CPT | Performed by: PHYSICAL THERAPIST

## 2021-04-13 PROCEDURE — G0439 PPPS, SUBSEQ VISIT: HCPCS | Performed by: INTERNAL MEDICINE

## 2021-04-13 PROCEDURE — 1123F ACP DISCUSS/DSCN MKR DOCD: CPT | Performed by: INTERNAL MEDICINE

## 2021-04-13 RX ORDER — EZETIMIBE 10 MG/1
TABLET ORAL
Qty: 90 TABLET | Refills: 3 | Status: SHIPPED | OUTPATIENT
Start: 2021-04-13

## 2021-04-13 NOTE — PROGRESS NOTES
Attempted to contact patient to discuss Symbicort assistance  Left message on voicemail and sent an email to Radha to check on status of application completion

## 2021-04-13 NOTE — ASSESSMENT & PLAN NOTE
Assessment and plan 1  Medicare subsequent annual wellness examination overall the patient is clinically stable and doing well, we encouraged the patient to follow a healthy and balanced diet  We recommend that the patient exercise routinely approximately 30 minutes 5 times per week   We have reviewed the patient's vaccines and have made recommendations for updates if necessary   COVID vaccine       We will be ordering screening laboratories which are age appropriate  Return to the office in    2 months   call if any problems

## 2021-04-13 NOTE — ASSESSMENT & PLAN NOTE
Lab Results   Component Value Date    HGBA1C 7 8 (H) 01/21/2021    I have counselled the pt to follow a healthy and balanced diet ,and recommend routine exercise   Working with pharmacist Alesia

## 2021-04-13 NOTE — PROGRESS NOTES
Assessment and Plan:     Problem List Items Addressed This Visit        Endocrine    Type 2 diabetes mellitus with complication ([facility] )       Lab Results   Component Value Date    HGBA1C 7 8 (H) 01/21/2021    I have counselled the pt to follow a healthy and balanced diet ,and recommend routine exercise  Working with pharmacist Alesia            Respiratory    Interstitial lung disease ([facility] )      Currently off prednisone because of right leg weakness that developed while on the  prednisone will follow-up with pulmonary currently he is stable            Nervous and Auditory    Right leg weakness      Will check EMG, CPK, aldolase, sed rate start physical therapy his symptoms are already 75% improved since stopping the prednisone he will remain off the prednisone         Relevant Orders    EMG 1 Limb    Ambulatory referral to Physical Therapy    Cortisol Level, AM Specimen    Sedimentation rate, automated    Aldolase    CK (with reflex to MB)       Other    Medicare annual wellness visit, subsequent       Assessment and plan 1  Medicare subsequent annual wellness examination overall the patient is clinically stable and doing well, we encouraged the patient to follow a healthy and balanced diet  We recommend that the patient exercise routinely approximately 30 minutes 5 times per week   We have reviewed the patient's vaccines and have made recommendations for updates if necessary   COVID vaccine       We will be ordering screening laboratories which are age appropriate  Return to the office in    2 months   call if any problems  Other Visit Diagnoses     Encounter for immunization    -  Primary        BMI Counseling: Body mass index is 17 24 kg/m²  Follow-up plan was not completed due to patient being in urgent or emergent medical situation           Preventive health issues were discussed with patient, and age appropriate screening tests were ordered as noted in patient's After Visit Summary  Personalized health advice and appropriate referrals for health education or preventive services given if needed, as noted in patient's After Visit Summary       History of Present Illness:     Patient presents for Medicare Annual Wellness visit    Patient Care Team:  Baldev Maldonado DO as PCP - [address] [place], [place] [address]MD Baldev Arteaga DO Larwence Drilling, DO Natalie Hawks, MD Loyal Sprain, DO Diann Arrant, MD as Endoscopist  Crow Chaudhry MD (Colon and Rectal Surgery)  Karla Sears, RT as Respiratory Therapy Care Manager (Respiratory Therapy)     Problem List:     Patient Active Problem List   Diagnosis    Bronchiectasis without complication ([facility] )    Abnormal chest CT    Cough    Coronary artery disease involving native coronary artery    Essential hypertension    Dyslipidemia    Mitral regurgitation    Diabetes 1 5, managed as type 2 ([facility] )    Erectile dysfunction    Screening PSA (prostate specific antigen)    S/P CABG (coronary artery bypass graft)    Type 2 diabetes mellitus with complication ([facility] )    Blood in the stool    Elevated PSA    Rectal bleeding    Hypothyroidism    Allergy    Encounter for hepatitis C screening test for low risk patient    Medicare annual wellness visit, subsequent    Seasonal allergies    Interstitial lung disease ([facility] )    Need for vaccination    Right leg weakness      Past Medical and Surgical History:     Past Medical History:   Diagnosis Date    3-vessel CAD     Anxiety     CAP (community acquired pneumonia)     Last assessed - 3/15/16    Cardiac murmur     Colon polyp     Diabetes mellitus ([facility] )     Disease of thyroid gland     History of poliomyelitis     Hyperlipidemia     Hypertension     Methicillin resistant Staphylococcus aureus infection     Mitral valve insufficiency     Myocardial infarction ([facility] )     Nonspecific abnormal results of function study of thyroid     Last assessed - 10/15/14     Past Surgical History:   Procedure Laterality Date    CARDIAC SURGERY      COLONOSCOPY      CORONARY ARTERY BYPASS GRAFT      x3; Last assessed - 17    NC COLONOSCOPY FLX DX W/COLLJ SPEC WHEN PFRMD N/A 3/27/2019    Procedure: COLONOSCOPY;  Surgeon: Hong Hernandez MD;  [facility];   Service: Gastroenterology      Family History:     Family History   Adopted: Yes   Problem Relation Age of Onset    Diabetes Family     No Known Problems Mother       Social History:     E-Cigarette/Vaping    E-Cigarette Use Never User      E-Cigarette/Vaping Substances    Nicotine No     THC No     CBD No     Flavoring No     Other No     Unknown No      Social History     Socioeconomic History    Marital status: /Civil Union     Spouse name: None    Number of children: None    Years of education: None    Highest education level: None   Occupational History    Occupation: Retired   Social Needs    Financial resource strain: None    Food insecurity     Worry: None     Inability: None    Transportation needs     Medical: None     Non-medical: None   Tobacco Use    Smoking status: Former Smoker     Packs/day: 0 50     Years: 30 00     Pack years: 15 00     Types: Cigarettes     Quit date:      Years since quittin 3    Smokeless tobacco: Never Used    Tobacco comment:    Substance and Sexual Activity    Alcohol use: No    Drug use: No    Sexual activity: Not Currently   Lifestyle    Physical activity     Days per week: None     Minutes per session: None    Stress: None   Relationships    Social connections     Talks on phone: None     Gets together: None     Attends Tenriism service: None     Active member of club or organization: None     Attends meetings of clubs or organizations: None     Relationship status: None    Intimate partner violence     Fear of current or ex partner: None     Emotionally abused: None     Physically abused: None     Forced sexual activity: None Other Topics Concern    None   Social History Narrative    Exercising regularly    Living with significant other      Medications and Allergies:     Current Outpatient Medications   Medication Sig Dispense Refill    albuterol (Ventolin HFA) 90 mcg/act inhaler Inhale 2 puffs every 6 (six) hours as needed for wheezing 18 Inhaler 1    ascorbic acid (VITAMIN C) 500 MG tablet Take 500 mg by mouth daily      aspirin (ECOTRIN) 325 mg EC tablet Take 1 tablet by mouth daily      budesonide-formoterol (SYMBICORT) 160-4 5 mcg/act inhaler Inhale 2 puffs 2 (two) times a day Rinse mouth after use   3 Inhaler 3    diltiazem (CARDIZEM CD) 240 mg 24 hr capsule TAKE ONE CAPSULE BY MOUTH EVERY DAY 90 capsule 3    ezetimibe (ZETIA) 10 mg tablet TAKE 1 TABLET BY MOUTH EVERY DAY 90 tablet 3    glucose blood test strip Use 1 each 2 (two) times a day Use as instructed 200 each 1    guaiFENesin (ROBITUSSIN) 100 MG/5ML oral liquid Take 100 mg by mouth 2 (two) times a day      hydrocortisone (ANUSOL-HC) 2 5 % rectal cream INSERT INTO THE RECTUM TWO TIMES A DAY 30 g 4    insulin isophane (HumuLIN N,NovoLIN N) 100 units/mL injection pen Inject 10 units in the morning and 6 units in the evening For diabetes - dispense Relion Pens 15 mL 1    Insulin Pen Needle (B-D UF III MINI PEN NEEDLES) 31G X 5 MM MISC by Does not apply route daily USE WITH BASAGLAR 100 each 5    levothyroxine 50 mcg tablet TAKE 1 TABLET BY MOUTH EVERY DAY 90 tablet 1    losartan (COZAAR) 50 mg tablet TAKE 1 TABLET BY MOUTH EVERY DAY 90 tablet 3    metFORMIN (GLUCOPHAGE-XR) 500 mg 24 hr tablet Take 2 tablets (1,000 mg total) by mouth 2 (two) times a day For diabetes 360 tablet 1    Omega-3 Fatty Acids (FISH OIL) 1,000 mg Take 1 capsule by mouth daily      OneTouch Delica Lancets 74W MISC Use twice daily 200 each 1    VITAMIN B COMPLEX-C PO Take 1 tablet by mouth daily      benzonatate (TESSALON PERLES) 100 mg capsule Take 1 capsule (100 mg total) by mouth daily at bedtime as needed for cough (Patient not taking: Reported on 2/25/2021) 30 capsule 0    diazepam (VALIUM) 5 mg tablet Take 1 tablet (5 mg total) by mouth every 8 (eight) hours as needed for anxiety (anxiety) (Patient not taking: Reported on 2/25/2021) 90 tablet 1    sildenafil (REVATIO) 20 mg tablet TAKE 1 TABLET BY MOUTH EVERY DAY AS NEEDED (Patient not taking: Reported on 2/26/2021) 90 tablet 1     Current Facility-Administered Medications   Medication Dose Route Frequency Provider Last Rate Last Admin    albuterol (ACCUNEB) nebulizer solution 1 25 mg  1 25 mg Nebulization Q6H PRN Seema Chandler MD         Allergies   Allergen Reactions    Simvastatin Rash    Atorvastatin Itching     Other reaction(s): Itching    Levofloxacin GI Intolerance     Other reaction(s): GI Upset    Pravastatin Itching     Other reaction(s): Itching      Immunizations:     Immunization History   Administered Date(s) Administered    Influenza Split High Dose Preservative Free IM 11/24/2014, 10/08/2016, 09/11/2017    Pneumococcal Polysaccharide PPV23 10/07/2020      Health Maintenance:         Topic Date Due    Colonoscopy Surveillance  03/27/2020    Colorectal Cancer Screening  03/27/2029    Hepatitis C Screening  Completed         Topic Date Due    COVID-19 Vaccine (1) Never done    DTaP,Tdap,and Td Vaccines (1 - Tdap) Never done      Medicare Health Risk Assessment:     /62   Pulse 77   Resp 16   Ht 5' 6" (1 676 m)   Wt 48 4 kg (106 lb 12 8 oz)   SpO2 92%   BMI 17 24 kg/m²      Ezekiel Nye is here for his Subsequent Wellness visit  Health Risk Assessment:   Patient rates overall health as good  Patient feels that their physical health rating is same  Patient is very satisfied with their life  Eyesight was rated as same  Hearing was rated as same  Patient feels that their emotional and mental health rating is same  Patients states they are never, rarely angry   Patient states they are never, rarely unusually tired/fatigued  Pain experienced in the last 7 days has been none  Patient states that he has experienced no weight loss or gain in last 6 months  Depression Screening:   PHQ-2 Score: 0      Fall Risk Screening: In the past year, patient has experienced: no history of falling in past year      Home Safety:  Patient does not have trouble with stairs inside or outside of their home  Patient has working smoke alarms and has working carbon monoxide detector  Home safety hazards include: none  Nutrition:   Current diet is Regular  Medications:   Patient is not currently taking any over-the-counter supplements  Patient is able to manage medications  Activities of Daily Living (ADLs)/Instrumental Activities of Daily Living (IADLs):   Walk and transfer into and out of bed and chair?: Yes  Dress and groom yourself?: Yes    Bathe or shower yourself?: Yes    Feed yourself? Yes  Do your laundry/housekeeping?: Yes  Manage your money, pay your bills and track your expenses?: Yes  Make your own meals?: Yes    Do your own shopping?: Yes    Previous Hospitalizations:   Any hospitalizations or ED visits within the last 12 months?: No      Advance Care Planning:   Living will: Yes    Durable POA for healthcare: Yes    Advanced directive: Yes      Cognitive Screening:   Provider or family/friend/caregiver concerned regarding cognition?: No    PREVENTIVE SCREENINGS      Cardiovascular Screening:    General: Screening Current      Diabetes Screening:     General: Screening Not Indicated and History Diabetes      Colorectal Cancer Screening:     General: Screening Current      Abdominal Aortic Aneurysm (AAA) Screening:    Risk factors include: age between 73-67 yo and tobacco use        Lung Cancer Screening:     General: Screening Not Indicated      Hepatitis C Screening:    General: Screening Current    Screening, Brief Intervention, and Referral to Treatment (SBIRT)    Screening      Single Item Drug Screening:   How often have you used an illegal drug (including marijuana) or a prescription medication for non-medical reasons in the past year? never    Single Item Drug Screen Score: 0  Interpretation: Negative screen for possible drug use disorder      Isak Macias, DO

## 2021-04-13 NOTE — ASSESSMENT & PLAN NOTE
Currently off prednisone because of right leg weakness that developed while on the  prednisone will follow-up with pulmonary currently he is stable

## 2021-04-13 NOTE — PROGRESS NOTES
Radha returned my call and stated he dropped off the hard copy of the application to the Pulmonary office today  RT will route this conversation to [facility] MA, to help obtain the MD scripts and send the application to [id number] [address] [employer] [address]

## 2021-04-13 NOTE — PATIENT INSTRUCTIONS
Medicare Preventive Visit Patient Instructions  Thank you for completing your Welcome to Medicare Visit or Medicare Annual Wellness Visit today  Your next wellness visit will be due in one year (4/14/2022)  The screening/preventive services that you may require over the next 5-10 years are detailed below  Some tests may not apply to you based off risk factors and/or age  Screening tests ordered at today's visit but not completed yet may show as past due  Also, please note that scanned in results may not display below  Preventive Screenings:  Service Recommendations Previous Testing/Comments   Colorectal Cancer Screening  · Colonoscopy    · Fecal Occult Blood Test (FOBT)/Fecal Immunochemical Test (FIT)  · Fecal DNA/Cologuard Test  · Flexible Sigmoidoscopy Age: 54-65 years old   Colonoscopy: every 10 years (May be performed more frequently if at higher risk)  OR  FOBT/FIT: every 1 year  OR  Cologuard: every 3 years  OR  Sigmoidoscopy: every 5 years  Screening may be recommended earlier than age 48 if at higher risk for colorectal cancer  Also, an individualized decision between you and your healthcare provider will decide whether screening between the ages of 74-80 would be appropriate  Colonoscopy: 03/27/2019  FOBT/FIT: Not on file  Cologuard: Not on file  Sigmoidoscopy: Not on file          Prostate Cancer Screening Individualized decision between patient and health care provider in men between ages of 53-78   Medicare will cover every 12 months beginning on the day after your 50th birthday PSA: 4 0 ng/mL           Hepatitis C Screening Once for adults born between 1945 and 1965  More frequently in patients at high risk for Hepatitis C Hep C Antibody: 07/26/2019        Diabetes Screening 1-2 times per year if you're at risk for diabetes or have pre-diabetes Fasting glucose: 64 mg/dL   A1C: 7 8 %        Cholesterol Screening Once every 5 years if you don't have a lipid disorder   May order more often based on risk factors  Lipid panel: 01/21/2021           Other Preventive Screenings Covered by Medicare:  1  Abdominal Aortic Aneurysm (AAA) Screening: covered once if your at risk  You're considered to be at risk if you have a family history of AAA or a male between the age of 73-68 who smoking at least 100 cigarettes in your lifetime  2  Lung Cancer Screening: covers low dose CT scan once per year if you meet all of the following conditions: (1) Age 50-69; (2) No signs or symptoms of lung cancer; (3) Current smoker or have quit smoking within the last 15 years; (4) You have a tobacco smoking history of at least 30 pack years (packs per day x number of years you smoked); (5) You get a written order from a healthcare provider  3  Glaucoma Screening: covered annually if you're considered high risk: (1) You have diabetes OR (2) Family history of glaucoma OR (3)  aged 48 and older OR (3)  American aged 72 and older  3  Osteoporosis Screening: covered every 2 years if you meet one of the following conditions: (1) Have a vertebral abnormality; (2) On glucocorticoid therapy for more than 3 months; (3) Have primary hyperparathyroidism; (4) On osteoporosis medications and need to assess response to drug therapy  5  HIV Screening: covered annually if you're between the age of 12-76  Also covered annually if you are younger than 13 and older than 72 with risk factors for HIV infection  For pregnant patients, it is covered up to 3 times per pregnancy  Immunizations:  Immunization Recommendations   Influenza Vaccine Annual influenza vaccination during flu season is recommended for all persons aged >= 6 months who do not have contraindications   Pneumococcal Vaccine (Prevnar and Pneumovax)  * Prevnar = PCV13  * Pneumovax = PPSV23 Adults 25-60 years old: 1-3 doses may be recommended based on certain risk factors  Adults 72 years old: Prevnar (PCV13) vaccine recommended followed by Pneumovax (PPSV23) vaccine   If already received PPSV23 since turning 65, then PCV13 recommended at least one year after PPSV23 dose  Hepatitis B Vaccine 3 dose series if at intermediate or high risk (ex: diabetes, end stage renal disease, liver disease)   Tetanus (Td) Vaccine - COST NOT COVERED BY MEDICARE PART B Following completion of primary series, a booster dose should be given every 10 years to maintain immunity against tetanus  Td may also be given as tetanus wound prophylaxis  Tdap Vaccine - COST NOT COVERED BY MEDICARE PART B Recommended at least once for all adults  For pregnant patients, recommended with each pregnancy  Shingles Vaccine (Shingrix) - COST NOT COVERED BY MEDICARE PART B  2 shot series recommended in those aged 48 and above     Health Maintenance Due:      Topic Date Due    Colonoscopy Surveillance  03/27/2020    Colorectal Cancer Screening  03/27/2029    Hepatitis C Screening  Completed     Immunizations Due:      Topic Date Due    COVID-19 Vaccine (1) Never done    DTaP,Tdap,and Td Vaccines (1 - Tdap) Never done    Influenza Vaccine (1) 09/01/2020     Advance Directives   What are advance directives? Advance directives are legal documents that state your wishes and plans for medical care  These plans are made ahead of time in case you lose your ability to make decisions for yourself  Advance directives can apply to any medical decision, such as the treatments you want, and if you want to donate organs  What are the types of advance directives? There are many types of advance directives, and each state has rules about how to use them  You may choose a combination of any of the following:  · Living will: This is a written record of the treatment you want  You can also choose which treatments you do not want, which to limit, and which to stop at a certain time  This includes surgery, medicine, IV fluid, and tube feedings  · Durable power of  for healthcare [facility]):   This is a written record that states who you want to make healthcare choices for you when you are unable to make them for yourself  This person, called a proxy, is usually a family member or a friend  You may choose more than 1 proxy  · Do not resuscitate (DNR) order:  A DNR order is used in case your heart stops beating or you stop breathing  It is a request not to have certain forms of treatment, such as CPR  A DNR order may be included in other types of advance directives  · Medical directive: This covers the care that you want if you are in a coma, near death, or unable to make decisions for yourself  You can list the treatments you want for each condition  Treatment may include pain medicine, surgery, blood transfusions, dialysis, IV or tube feedings, and a ventilator (breathing machine)  · Values history: This document has questions about your views, beliefs, and how you feel and think about life  This information can help others choose the care that you would choose  Why are advance directives important? An advance directive helps you control your care  Although spoken wishes may be used, it is better to have your wishes written down  Spoken wishes can be misunderstood, or not followed  Treatments may be given even if you do not want them  An advance directive may make it easier for your family to make difficult choices about your care  Underweight  Underweight is defined as having a body mass index (BMI) of less than 18 5 kg/m2   Anorexia  is a loss of appetite, decreased food intake, or both  Your appetite naturally decreases as you get older  You also get full faster than you used to  This occurs because your body needs less energy  Other body changes can also lead to a decreased appetite  Even though some appetite loss is normal, you still need to get enough calories and nutrients to keep you healthy  You can start to lose too much weight if you do not eat as much food as your body needs   Unwanted weight loss can cause health problems, or worsen health problems you already have  You can also become dehydrated if you do not drink enough liquid  How to eat healthy and get enough nutrients:   · Choose healthy foods  Eat a variety of fruits, vegetables, whole grains, low-fat dairy foods, lean meats, and other protein foods  Limit foods high in fat, sugar, and salt  Limit or avoid alcohol as directed  Work with a dietitian to help you plan your meals if you need to follow a special diet  A dietitian can also teach you how to modify foods if you have trouble chewing or swallowing  · Snack on healthy foods between meals  if you only eat a small amount during meals  Snacks provide extra healthy nutrients and calories between meals  Examples include fruit, cheese, and whole grain crackers  · Drink liquids as directed  to avoid dehydration  Drink liquids between meals if they cause you to get full too quickly during meals  Ask how much liquid to drink each day and which liquids are best for you  · Use herbs, spices, and flavor enhancers to add flavor to foods  Avoid using herbs and spice blends that also contain sodium  Ask your healthcare provider or dietitian about flavor enhancers  Flavor enhancers with ham, natural kohler, and roast beef flavors can also be sprinkled on food to add flavor  · Share meals with others as often as you can  Eating with others may help you to eat better during meal time  Ask family members, neighbors, or friends to join you for lunch  There are also senior centers where you can meet people, and share meals with them  · Ask family and friends for help  with shopping or preparing foods  Ask for a ride to the grocery store, if needed  © Copyright [employer] 2018 Information is for End User's use only and may not be sold, redistributed or otherwise used for commercial purposes   All illustrations and images included in CareNotes® are the copyrighted property of [employer]  or [employer]

## 2021-04-13 NOTE — PROGRESS NOTES
Assessment/Plan:    Type 2 diabetes mellitus with complication ([facility])    Lab Results   Component Value Date    HGBA1C 7 8 (H) 01/21/2021    I have counselled the pt to follow a healthy and balanced diet ,and recommend routine exercise  Working with pharmacist Alesia    Interstitial lung disease ([place] [id number] )   Currently off prednisone because of right leg weakness that developed while on the  prednisone will follow-up with pulmonary currently he is stable    Right leg weakness   Will check EMG, CPK, aldolase, sed rate start physical therapy his symptoms are already 75% improved since stopping the prednisone he will remain off the prednisone         Problem List Items Addressed This Visit        Endocrine    Type 2 diabetes mellitus with complication ([place] [address] )       Lab Results   Component Value Date    HGBA1C 7 8 (H) 01/21/2021    I have counselled the pt to follow a healthy and balanced diet ,and recommend routine exercise  Working with pharmacist Alesia            Respiratory    Interstitial lung disease ([place] [id number] )      Currently off prednisone because of right leg weakness that developed while on the  prednisone will follow-up with pulmonary currently he is stable            Nervous and Auditory    Right leg weakness      Will check EMG, CPK, aldolase, sed rate start physical therapy his symptoms are already 75% improved since stopping the prednisone he will remain off the prednisone         Relevant Orders    EMG 1 Limb    Ambulatory referral to Physical Therapy    Cortisol Level, AM Specimen    Sedimentation rate, automated    Aldolase    CK (with reflex to MB)       Other    Medicare annual wellness visit, subsequent      Other Visit Diagnoses     Encounter for immunization    -  Primary           RTO in  2 months call if any problems he is using a cane  Subjective:      Patient ID: Brad Sawyer is a 65 y o  male      HPI  65-year old male coming in for a follow up visit regarding right leg weakness, type 2 diabetes, interstitial lung disease; The patient reports me compliant taking medications without untoward side effects the  The patient is here to review his medical condition, update me on the medical condition and the patient reports me no hospitalizations and no ER visits  coming in for a follow-up he reports me that he has developed bilateral lower extremity weakness since being on a steroid for 3 months  He reports me that he was on the steroid for a lung condition he has interstitial lung disease/bronchiectasis with left-sided GG does period he has been working with Pulmonary  Currently it noted on joint pain and swelling therefore he stop the prednisone in February he stop  He is notify AR swelling in bilateral knees and also a shininess to skin in both knees and ankles also swelling around his face while on the prednisone the symptoms did improve after stopping him steroids he is about 70% the symptoms primary he reports me is doing notify him are watery on normal on therapy  He reports me that for 2 months he would walk with a walker and bilateral canes  The following portions of the patient's history were reviewed and updated as appropriate: allergies, current medications, past family history, past medical history, past social history, past surgical history and problem list     Review of Systems   Constitutional: Negative for activity change, appetite change and unexpected weight change  HENT: Negative for congestion and postnasal drip  Eyes: Negative for visual disturbance  Respiratory: Negative for cough and shortness of breath  Cardiovascular: Negative for chest pain  Gastrointestinal: Negative for abdominal pain, diarrhea, nausea and vomiting  Neurological: Positive for weakness ( weakness of the right leg)  Negative for dizziness, light-headedness and headaches  Objective:    No follow-ups on file      Procedure: Xr Chest Pa & Lateral    Result Date: 3/30/2021  Narrative: CHEST INDICATION:   J84 9: Interstitial pulmonary disease, unspecified  COMPARISON:  Chest radiograph from 3/10/2016 and chest CT from 12/26/2020  EXAM PERFORMED/VIEWS:  XR CHEST PA & LATERAL  DUAL ENERGY SUBTRACTION  FINDINGS: Mild cardiomegaly  CABG  Persistent pulmonary fibrosis, greatest in the left lower lobe, with groundglass opacity  No effusion or pneumothorax  Mild degenerative disease in the spine  Impression: Redemonstration of interstitial lung disease, greatest in the left base  No definite acute pulmonary disease  Workstation performed: [id number]         Allergies   Allergen Reactions    Simvastatin Rash    Atorvastatin Itching     Other reaction(s): Itching    Levofloxacin GI Intolerance     Other reaction(s): GI Upset    Pravastatin Itching     Other reaction(s): Itching       Past Medical History:   Diagnosis Date    3-vessel CAD     Anxiety     CAP (community acquired pneumonia)     Last assessed - 3/15/16    Cardiac murmur     Colon polyp     Diabetes mellitus ([facility] [address] )     Disease of thyroid gland     History of poliomyelitis     Hyperlipidemia     Hypertension     Methicillin resistant Staphylococcus aureus infection     Mitral valve insufficiency     Myocardial infarction ([facility] [address] )     Nonspecific abnormal results of function study of thyroid     Last assessed - 10/15/14     Past Surgical History:   Procedure Laterality Date    CARDIAC SURGERY  1997    COLONOSCOPY      CORONARY ARTERY BYPASS GRAFT      x3; Last assessed - 9/29/17    IA COLONOSCOPY FLX DX W/COLLJ SPEC WHEN PFRMD N/A 3/27/2019    Procedure: COLONOSCOPY;  Surgeon: Karis Vaughan MD;  Location: [facility];   Service: Gastroenterology     Current Outpatient Medications on File Prior to Visit   Medication Sig Dispense Refill    albuterol (Ventolin HFA) 90 mcg/act inhaler Inhale 2 puffs every 6 (six) hours as needed for wheezing 18 Inhaler 1    ascorbic acid (VITAMIN C) 500 MG tablet Take 500 mg by mouth daily      aspirin (ECOTRIN) 325 mg EC tablet Take 1 tablet by mouth daily      budesonide-formoterol (SYMBICORT) 160-4 5 mcg/act inhaler Inhale 2 puffs 2 (two) times a day Rinse mouth after use   3 Inhaler 3    diltiazem (CARDIZEM CD) 240 mg 24 hr capsule TAKE ONE CAPSULE BY MOUTH EVERY DAY 90 capsule 3    glucose blood test strip Use 1 each 2 (two) times a day Use as instructed 200 each 1    guaiFENesin (ROBITUSSIN) 100 MG/5ML oral liquid Take 100 mg by mouth 2 (two) times a day      hydrocortisone (ANUSOL-HC) 2 5 % rectal cream INSERT INTO THE RECTUM TWO TIMES A DAY 30 g 4    insulin isophane (HumuLIN N,NovoLIN N) 100 units/mL injection pen Inject 10 units in the morning and 6 units in the evening For diabetes - dispense Relion Pens 15 mL 1    Insulin Pen Needle (B-D UF III MINI PEN NEEDLES) 31G X 5 MM MISC by Does not apply route daily USE WITH BASAGLAR 100 each 5    levothyroxine 50 mcg tablet TAKE 1 TABLET BY MOUTH EVERY DAY 90 tablet 1    losartan (COZAAR) 50 mg tablet TAKE 1 TABLET BY MOUTH EVERY DAY 90 tablet 3    metFORMIN (GLUCOPHAGE-XR) 500 mg 24 hr tablet Take 2 tablets (1,000 mg total) by mouth 2 (two) times a day For diabetes 360 tablet 1    Omega-3 Fatty Acids (FISH OIL) 1,000 mg Take 1 capsule by mouth daily      OneTouch Delica Lancets 99O MISC Use twice daily 200 each 1    VITAMIN B COMPLEX-C PO Take 1 tablet by mouth daily      benzonatate (TESSALON PERLES) 100 mg capsule Take 1 capsule (100 mg total) by mouth daily at bedtime as needed for cough (Patient not taking: Reported on 2/25/2021) 30 capsule 0    diazepam (VALIUM) 5 mg tablet Take 1 tablet (5 mg total) by mouth every 8 (eight) hours as needed for anxiety (anxiety) (Patient not taking: Reported on 2/25/2021) 90 tablet 1    sildenafil (REVATIO) 20 mg tablet TAKE 1 TABLET BY MOUTH EVERY DAY AS NEEDED (Patient not taking: Reported on 2/26/2021) 90 tablet 1    [DISCONTINUED] ezetimibe (ZETIA) 10 mg tablet Take 1 tablet (10 mg total) by mouth daily 30 tablet 11     Current Facility-Administered Medications on File Prior to Visit   Medication Dose Route Frequency Provider Last Rate Last Admin    albuterol (ACCUNEB) nebulizer solution 1 25 mg  1 25 mg Nebulization Q6H PRN Marv Delcid MD         Family History   Adopted: Yes   Problem Relation Age of Onset    Diabetes Family     No Known Problems Mother      Social History     Socioeconomic History    Marital status: /Civil Union     Spouse name: Not on file    Number of children: Not on file    Years of education: Not on file    Highest education level: Not on file   Occupational History    Occupation: Retired   Social Needs    Financial resource strain: Not on file    Food insecurity     Worry: Not on file     Inability: Not on file   [employer] needs     Medical: Not on file     Non-medical: Not on file   Tobacco Use    Smoking status: Former Smoker     Packs/day: 0 50     Years: 30 00     Pack years: 15 00     Types: Cigarettes     Quit date:      Years since quittin 3    Smokeless tobacco: Never Used    Tobacco comment:    Substance and Sexual Activity    Alcohol use: No    Drug use: No    Sexual activity: Not Currently   Lifestyle    Physical activity     Days per week: Not on file     Minutes per session: Not on file    Stress: Not on file   Relationships    Social connections     Talks on phone: Not on file     Gets together: Not on file     Attends Voodoo service: Not on file     Active member of club or organization: Not on file     Attends meetings of clubs or organizations: Not on file     Relationship status: Not on file    Intimate partner violence     Fear of current or ex partner: Not on file     Emotionally abused: Not on file     Physically abused: Not on file     Forced sexual activity: Not on file   Other Topics Concern    Not on file   Social History Narrative    Exercising regularly    Living with significant other     Vitals: 04/13/21 1434   BP: 108/62   Pulse: 77   Resp: 16   SpO2: 92%   Weight: 48 4 kg (106 lb 12 8 oz)   Height: 5' 6" (1 676 m)     Results for orders placed or performed in visit on 01/21/21   Comprehensive metabolic panel   Result Value Ref Range    Sodium 136 136 - 145 mmol/L    Potassium 4 5 3 5 - 5 3 mmol/L    Chloride 99 (L) 100 - 108 mmol/L    CO2 31 21 - 32 mmol/L    ANION GAP 6 4 - 13 mmol/L    BUN 28 (H) 5 - 25 mg/dL    Creatinine 0 91 0 60 - 1 30 mg/dL    Glucose, Fasting 64 (L) 65 - 99 mg/dL    Calcium 9 2 8 3 - 10 1 mg/dL    Corrected Calcium 9 8 8 3 - 10 1 mg/dL    AST 20 5 - 45 U/L    ALT 47 12 - 78 U/L    Alkaline Phosphatase 54 46 - 116 U/L    Total Protein 6 6 6 4 - 8 2 g/dL    Albumin 3 2 (L) 3 5 - 5 0 g/dL    Total Bilirubin 0 50 0 20 - 1 00 mg/dL    eGFR 84 ml/min/1 73sq m   Hemoglobin A1C   Result Value Ref Range    Hemoglobin A1C 7 8 (H) Normal 3 8-5 6%; PreDiabetic 5 7-6 4%; Diabetic >=6 5%; Glycemic control for adults with diabetes <7 0% %     mg/dl   Lipid Panel with Direct LDL reflex   Result Value Ref Range    Cholesterol 222 (H) 50 - 200 mg/dL    Triglycerides 123 <=150 mg/dL    HDL, Direct 98 >=40 mg/dL    LDL Calculated 99 0 - 100 mg/dL   Microalbumin / creatinine urine ratio   Result Value Ref Range    Creatinine, Ur 40 2 mg/dL    Microalbum  ,U,Random 37 8 (H) 0 0 - 20 0 mg/L    Microalb Creat Ratio 94 (H) 0 - 30 mg/g creatinine     Weight (last 2 days)     Date/Time   Weight    04/13/21 1434   48 4 (106 8)            Body mass index is 17 24 kg/m²  BP      Temp      Pulse     Resp      SpO2        Vitals:    04/13/21 1434   Weight: 48 4 kg (106 lb 12 8 oz)     Vitals:    04/13/21 1434   Weight: 48 4 kg (106 lb 12 8 oz)       /62   Pulse 77   Resp 16   Ht 5' 6" (1 676 m)   Wt 48 4 kg (106 lb 12 8 oz)   SpO2 92%   BMI 17 24 kg/m²          Physical Exam  Constitutional:       General: He is not in acute distress  Appearance: He is well-developed   He is not diaphoretic  HENT:      Head: Normocephalic and atraumatic  Right Ear: External ear normal       Left Ear: External ear normal    Eyes:      General: No scleral icterus  Right eye: No discharge  Left eye: No discharge  Conjunctiva/sclera: Conjunctivae normal       Pupils: Pupils are equal, round, and reactive to light  Neck:      Musculoskeletal: Neck supple  Cardiovascular:      Rate and Rhythm: Normal rate and regular rhythm  Heart sounds: Normal heart sounds  No murmur  No friction rub  No gallop  Pulmonary:      Effort: No respiratory distress  Breath sounds: No wheezing or rales  Abdominal:      General: Bowel sounds are normal  There is no distension  Palpations: Abdomen is soft  There is no mass  Tenderness: There is no abdominal tenderness  There is no guarding or rebound  Musculoskeletal:         General: No deformity  Lymphadenopathy:      Cervical: No cervical adenopathy  Skin:     Findings: No erythema  Neurological:      Mental Status: He is alert          Examination of the lower extremity does show DTR 2/4 bilateral, muscles bed 5/5 on the right left side has atrophy and also 4/5 muscle strength, sensation is intact bilateral knee examination no effusion full range of motion he does have  Straight leg raising 70° on the right

## 2021-04-13 NOTE — ASSESSMENT & PLAN NOTE
Will check EMG, CPK, aldolase, sed rate start physical therapy his symptoms are already 75% improved since stopping the prednisone he will remain off the prednisone

## 2021-04-16 ENCOUNTER — PATIENT OUTREACH (OUTPATIENT)
Dept: CASE MANAGEMENT | Facility: HOSPITAL | Age: 71
End: 2021-04-16

## 2021-04-16 NOTE — PROGRESS NOTES
Fausto Palencia confirmed submission of the application and will follow-up as information becomes available

## 2021-04-16 NOTE — PROGRESS NOTES
In-basket message sent to [facility] to check on the status of the [employer] assistance application

## 2021-04-19 ENCOUNTER — OFFICE VISIT (OUTPATIENT)
Dept: PHYSICAL THERAPY | Facility: CLINIC | Age: 71
End: 2021-04-19
Payer: MEDICARE

## 2021-04-19 DIAGNOSIS — R29.898 RIGHT LEG WEAKNESS: Primary | ICD-10-CM

## 2021-04-19 PROCEDURE — 97116 GAIT TRAINING THERAPY: CPT

## 2021-04-19 PROCEDURE — 97110 THERAPEUTIC EXERCISES: CPT

## 2021-04-19 PROCEDURE — 97530 THERAPEUTIC ACTIVITIES: CPT

## 2021-04-19 NOTE — PROGRESS NOTES
Daily Note     Today's date: 2021  Patient name: Donny Dawkins  : 1950  MRN: [id number]  Referring provider: Valeri Holguin DO  Dx:   Encounter Diagnosis     ICD-10-CM    1  Right leg weakness  R29 [id number]                   Subjective: Pt reports no falls since last tx session  Pt reports no left ankle pain  Objective: See treatment diary below      Assessment: Pt demonstrated poor eccentric quad control with STS and step downs, good tolerance to Alter G gait training  Pt declined Laser 2* having no pain  Plan: Assess response to tx nv  Precautions: Increased fall risk: Utilize GAIT BELT, cardiac, DM  Dx: R LE weakness after steroid use        Manuals            Laser L ATFL  np                                                  Neuro Re-Ed                                                                                                        Ther Ex             STS 1x10 3x10           LAQ Y/ 1x10 Y/ 3x10           ecc lateral step-downs  6"/  R/ 3x8           SLR 1x10 1x10                                                               Ther Activity             Step-ups 1x10 U railiing 3x10 U railiing           steop-downs 1x10 U railing 2x10 U railiing           Gait Training             Alter G  50% BW  0 8 mph  10 min                        Modalities

## 2021-04-21 ENCOUNTER — APPOINTMENT (OUTPATIENT)
Dept: PHYSICAL THERAPY | Facility: CLINIC | Age: 71
End: 2021-04-21
Payer: MEDICARE

## 2021-04-22 ENCOUNTER — OFFICE VISIT (OUTPATIENT)
Dept: PHYSICAL THERAPY | Facility: CLINIC | Age: 71
End: 2021-04-22
Payer: MEDICARE

## 2021-04-22 DIAGNOSIS — R29.898 RIGHT LEG WEAKNESS: Primary | ICD-10-CM

## 2021-04-22 PROCEDURE — 97530 THERAPEUTIC ACTIVITIES: CPT

## 2021-04-22 PROCEDURE — 97110 THERAPEUTIC EXERCISES: CPT

## 2021-04-22 NOTE — PROGRESS NOTES
Daily Note     Today's date: 2021  Patient name: Audrey Dukes  : 1950  MRN: [id number]  Referring provider: Cullen Sanchez DO  Dx:   Encounter Diagnosis     ICD-10-CM    1  Right leg weakness  [id number] [id number]                   Subjective: Pt reports no changes since lv  Objective: See treatment diary below      Assessment: Pt initiates good quad contraction for SLR but lacks significant strength for lift and demonstrates poor eccentric control  Pt continues to decline Laser 2* no pain  Pt demonstrates slight fatigue with TE, requests frequent breaks between exercises today  Pt demonstrates good tolerance to gait training with AlterG  Plan: Assess response to tx nv  Precautions: Increased fall risk: Utilize GAIT BELT, cardiac, DM  Dx: R LE weakness after steroid use        Manuals           Laser L ATFL  np np                                                 Neuro Re-Ed                                                                                                        Ther Ex             STS 1x10 3x10 3x10          LAQ Y/ 1x10 Y/ 3x10 Y/  3x10          ecc lateral step-downs  6"/  R/ 3x8 6"/  R/ 3x8          SLR 1x10 1x10 2x10                                                              Ther Activity             Step-ups 1x10 U railiing 3x10 U railiing 6"/  3x10  U railing          steop-downs 1x10 U railing 2x10 U railiing 6"/  2x10 U railing          Gait Training             Alter G  50% BW  0 8 mph  10 min 50% BW  0 8 mph  10 min                       Modalities

## 2021-04-26 ENCOUNTER — OFFICE VISIT (OUTPATIENT)
Dept: PULMONOLOGY | Facility: CLINIC | Age: 71
End: 2021-04-26
Payer: MEDICARE

## 2021-04-26 VITALS
TEMPERATURE: 97.3 F | HEART RATE: 84 BPM | RESPIRATION RATE: 18 BRPM | HEIGHT: 66 IN | BODY MASS INDEX: 18.32 KG/M2 | OXYGEN SATURATION: 95 % | DIASTOLIC BLOOD PRESSURE: 60 MMHG | SYSTOLIC BLOOD PRESSURE: 118 MMHG | WEIGHT: 114 LBS

## 2021-04-26 DIAGNOSIS — J47.9 BRONCHIECTASIS WITHOUT COMPLICATION (HCC): ICD-10-CM

## 2021-04-26 DIAGNOSIS — R05.9 COUGH: ICD-10-CM

## 2021-04-26 DIAGNOSIS — J84.9 INTERSTITIAL LUNG DISEASE (HCC): Primary | ICD-10-CM

## 2021-04-26 DIAGNOSIS — R93.89 ABNORMAL CHEST CT: ICD-10-CM

## 2021-04-26 PROCEDURE — 99214 OFFICE O/P EST MOD 30 MIN: CPT | Performed by: INTERNAL MEDICINE

## 2021-04-26 NOTE — PROGRESS NOTES
Pulmonary Follow Up Note   Kristy Miller 65 y o  male MRN: [id number]  4/26/2021      Assessment:  1  Abnormal CT Chest consistent with Interstitial Lung disease  · DDx includes IPAF, NSIP, vasculitis & HP - less likely IPF, sarcoid  · Extensive serology evaluation negative except for pANCA 1:40 - unclear significance no other evidence of vasculitis  · Bronchoscopy with negative cultures and noted mixed neutrophils/macrophages  · VBS negative for aspiration  · He has completed 3+ months of systemic steroids Feb 2021  · CXR w/o new infiltrates and complete PFTs with stable spirometry, reduced TLC  · May need to consider resuming steroids and adding steroid sparing agent (cellcept/imuran etc) if inflammatory findings return  · Continue ICS/LABA and attempt symbicort approval   · Repeat Spirometry with next visit  · He declines all vaccines at this time including COVID-19     2  Bronchiectasis - as listed above, cont ICS/LABA dosing     3  Mitral regurgitation - no evidence of volume overload/CHF on exam today     4  Diabetes - per PCP     5  Severely reduced diffusion - consider 6MWT with next visit  Plan:    Diagnoses and all orders for this visit:    Interstitial lung disease ([facility] [address] )    Bronchiectasis without complication ([facility] [address] )    Abnormal chest CT    Cough        Return in about 4 months (around 8/26/2021) for Recheck  History of Present Illness   HPI:  Kristy Miller is a 65 y o  male who has CAD post CABG, DM2, Prior Polio syndrome, with abnormal CT chest with bronchiectasis and left sided GGOs  He was initially seen in June 2016 and has been followed with serial radiographs  During televisit in May 2020 with Dr Lydia Nuñez a repeat CT chest was obtained with some progression  In July 2020 further evaluation was planned including serology, VBS, HRCT, and PFTs as listed below  He underwent bronchoscopy in Oct 2020  He was last seen in November 2020 and started on systemic steroids with prednisone 40mg daily    He has since completed taper and was last seen Feb 2021      He reports feeling improved  His strength has improved and he has been doing physical therapy  He reports scant white sputum production  He reported no hemoptysis, no pleurisy, no fevers or chills  He reports he is using Breo and attempting Symbicort approval   He denied nocturnal or rest dyspnea  He denied aspiration events  He notes sputum production is less with Breo use        Additional Social history  - remote smoker quit [facility], immigrated after war, Agent Orange exposure  - [employer] for [employer] - retired   - Negative PPD in past    Review of Systems   Constitutional: Positive for activity change  Negative for chills, fatigue and fever  HENT: Negative for mouth sores, rhinorrhea, sore throat and trouble swallowing  Respiratory: Positive for cough  Negative for chest tightness, shortness of breath and wheezing  Cardiovascular: Negative for chest pain, palpitations and leg swelling  Gastrointestinal: Negative for abdominal pain, nausea and vomiting  Musculoskeletal: Positive for gait problem  Negative for arthralgias and myalgias  Neurological: Negative for light-headedness and headaches  Hematological: Negative for adenopathy  Psychiatric/Behavioral: Negative for sleep disturbance  The patient is not nervous/anxious          Historical Information   Past Medical History:   Diagnosis Date    3-vessel CAD     Anxiety     CAP (community acquired pneumonia)     Last assessed - 3/15/16    Cardiac murmur     Colon polyp     Diabetes mellitus ([facility] [address] )     Disease of thyroid gland     History of poliomyelitis     Hyperlipidemia     Hypertension     Methicillin resistant Staphylococcus aureus infection     Mitral valve insufficiency     Myocardial infarction ([facility] [address] )     Nonspecific abnormal results of function study of thyroid     Last assessed - 10/15/14     Past Surgical History:   Procedure Laterality Date    CARDIAC SURGERY  1997    COLONOSCOPY      CORONARY ARTERY BYPASS GRAFT      x3; Last assessed - 9/29/17    ND COLONOSCOPY FLX DX W/COLLJ SPEC WHEN PFRMD N/A 3/27/2019    Procedure: COLONOSCOPY;  Surgeon: Anabela Ng MD;  Location: [facility];   Service: Gastroenterology     Family History   Adopted: Yes   Problem Relation Age of Onset    Diabetes Family     No Known Problems Mother          Meds/Allergies     Current Outpatient Medications:     albuterol (Ventolin HFA) 90 mcg/act inhaler, Inhale 2 puffs every 6 (six) hours as needed for wheezing, Disp: 18 Inhaler, Rfl: 1    ascorbic acid (VITAMIN C) 500 MG tablet, Take 500 mg by mouth daily, Disp: , Rfl:     aspirin (ECOTRIN) 325 mg EC tablet, Take 1 tablet by mouth daily, Disp: , Rfl:     budesonide-formoterol (SYMBICORT) 160-4 5 mcg/act inhaler, Inhale 2 puffs 2 (two) times a day Rinse mouth after use , Disp: 3 Inhaler, Rfl: 3    diltiazem (CARDIZEM CD) 240 mg 24 hr capsule, TAKE ONE CAPSULE BY MOUTH EVERY DAY, Disp: 90 capsule, Rfl: 3    ezetimibe (ZETIA) 10 mg tablet, TAKE 1 TABLET BY MOUTH EVERY DAY, Disp: 90 tablet, Rfl: 3    glucose blood test strip, Use 1 each 2 (two) times a day Use as instructed, Disp: 200 each, Rfl: 1    guaiFENesin (ROBITUSSIN) 100 MG/5ML oral liquid, Take 100 mg by mouth 2 (two) times a day, Disp: , Rfl:     hydrocortisone (ANUSOL-HC) 2 5 % rectal cream, INSERT INTO THE RECTUM TWO TIMES A DAY, Disp: 30 g, Rfl: 4    insulin isophane (HumuLIN N,NovoLIN N) 100 units/mL injection pen, Inject 10 units in the morning and 6 units in the evening For diabetes - dispense Relion Pens, Disp: 15 mL, Rfl: 1    Insulin Pen Needle (B-D UF III MINI PEN NEEDLES) 31G X 5 MM MISC, by Does not apply route daily USE WITH BASAGLAR, Disp: 100 each, Rfl: 5    levothyroxine 50 mcg tablet, TAKE 1 TABLET BY MOUTH EVERY DAY, Disp: 90 tablet, Rfl: 1    losartan (COZAAR) 50 mg tablet, TAKE 1 TABLET BY MOUTH EVERY DAY, Disp: 90 tablet, Rfl: 3   metFORMIN (GLUCOPHAGE-XR) 500 mg 24 hr tablet, Take 2 tablets (1,000 mg total) by mouth 2 (two) times a day For diabetes, Disp: 360 tablet, Rfl: 1    Omega-3 Fatty Acids (FISH OIL) 1,000 mg, Take 1 capsule by mouth daily, Disp: , Rfl:     OneTouch Delica Lancets 79L MISC, Use twice daily, Disp: 200 each, Rfl: 1    VITAMIN B COMPLEX-C PO, Take 1 tablet by mouth daily, Disp: , Rfl:     benzonatate (TESSALON PERLES) 100 mg capsule, Take 1 capsule (100 mg total) by mouth daily at bedtime as needed for cough (Patient not taking: Reported on 4/26/2021), Disp: 30 capsule, Rfl: 0    diazepam (VALIUM) 5 mg tablet, Take 1 tablet (5 mg total) by mouth every 8 (eight) hours as needed for anxiety (anxiety) (Patient not taking: Reported on 2/25/2021), Disp: 90 tablet, Rfl: 1    sildenafil (REVATIO) 20 mg tablet, TAKE 1 TABLET BY MOUTH EVERY DAY AS NEEDED (Patient not taking: Reported on 2/26/2021), Disp: 90 tablet, Rfl: 1    Current Facility-Administered Medications:     albuterol (ACCUNEB) nebulizer solution 1 25 mg, 1 25 mg, Nebulization, Q6H PRN, Pita Marquis MD  Allergies   Allergen Reactions    Simvastatin Rash    Atorvastatin Itching     Other reaction(s): Itching    Levofloxacin GI Intolerance     Other reaction(s): GI Upset    Pravastatin Itching     Other reaction(s): Itching       Vitals: Blood pressure 118/60, pulse 84, temperature (!) 97 3 °F (36 3 °C), temperature source Tympanic, resp  rate 18, height 5' 6" (1 676 m), weight 51 7 kg (114 lb), SpO2 95 %  Body mass index is 18 4 kg/m²  Oxygen Therapy  SpO2: 95 %      Physical Exam  Physical Exam  Constitutional:       General: He is not in acute distress  Appearance: Normal appearance  He is well-developed and normal weight  He is not ill-appearing, toxic-appearing or diaphoretic  HENT:      Head: Normocephalic and atraumatic        Right Ear: External ear normal       Left Ear: External ear normal       Nose: Nose normal       Mouth/Throat: Mouth: Mucous membranes are moist       Pharynx: Oropharynx is clear  No oropharyngeal exudate  Eyes:      General: No scleral icterus  Right eye: No discharge  Left eye: No discharge  Conjunctiva/sclera: Conjunctivae normal       Pupils: Pupils are equal, round, and reactive to light  Neck:      Musculoskeletal: Neck supple  Vascular: No JVD  Trachea: No tracheal deviation  Cardiovascular:      Rate and Rhythm: Normal rate and regular rhythm  Heart sounds: Normal heart sounds  No murmur  Pulmonary:      Effort: Pulmonary effort is normal  No respiratory distress  Breath sounds: No stridor  Rales present  No wheezing or rhonchi  Comments: Very mild left basilar rales, no wheeze, no rhonchi  Abdominal:      General: Bowel sounds are normal  There is no distension  Palpations: Abdomen is soft  Tenderness: There is no abdominal tenderness  There is no guarding  Musculoskeletal:         General: Deformity present  No swelling  Right lower leg: No edema  Left lower leg: No edema  Comments: Leg brace in place   Lymphadenopathy:      Cervical: No cervical adenopathy  Skin:     General: Skin is warm and dry  Findings: No rash  Neurological:      General: No focal deficit present  Mental Status: He is alert and oriented to person, place, and time  Psychiatric:         Mood and Affect: Mood normal          Behavior: Behavior normal          Thought Content: Thought content normal          Labs: I have personally reviewed pertinent lab results    Lab Results   Component Value Date    WBC 7 59 08/27/2020    HGB 14 7 08/27/2020    HCT 43 8 08/27/2020    MCV 90 08/27/2020     08/27/2020     Lab Results   Component Value Date    GLUCOSE 112 01/09/2014    CALCIUM 9 2 01/21/2021     (L) 01/09/2014    K 4 5 01/21/2021    CO2 31 01/21/2021    CL 99 (L) 01/21/2021    BUN 28 (H) 01/21/2021    CREATININE 0 91 01/21/2021     Lab Results   Component Value Date    IGE 75 2 08/27/2020     Lab Results   Component Value Date    ALT 47 01/21/2021    AST 20 01/21/2021    ALKPHOS 54 01/21/2021    BILITOT 0 3 07/31/2014       Bronchoscopy Lingula BAL 10/16/2020  Cultures negative (Fungal, AFB, bacterial culture, viral culture)  Cytology neg for malignancy, Cell Count 50% macrophages, 40% neutrophils, 10% lymphocytes     8/2020 - PRADEEP neg, CK mb 2 6, SSa/b neg, CRP <3, AGBM neg, C Anca neg, pANCA 1:40, DC-3 neg, MPO neg, RF neg, JORJE neg, Scl-70 neg, HP panel neg, IgG 1770, IgM 311, IgE 75, NE RAST (+) trees, ragweed, birch, COVID-19 antibodies negative     3/2018 RA panel negative, CCP 4     Imaging and other studies: New images and reports personally reviewed   CXR 3/26/21 - persistent fibrotic/interstitial changes mostly left mid-lower lobe, no PTX, sternal wires    12/26/2020 - HRCT Chest - severe bronchiectasis in LLL and to lesser extent on RLL, significant improving in GGOs, no sig mediastinal adenopathy, no effusions     9/25/2020 - HRCT Chest - continued bronchiectasis and honeycombing mostly in LLL, progressive GGOs in left and RUL, enlarged main PA     9/10/2020 - VBS - normal oral and pharyngeal stages of swallowing      5/18/2020 - noted continued LLL bronchiectasis and to lesser degree on right with some increase in peripheral ground glass component and interstitial thickening within MONICO and mild GGO in RUL, trace left effusion, mild mediastinal adenopathy     HRCT Chest 12/18/17 - no change in left lower lobe bronchiectasis and surrounding ground glass opacities, mild basilar segmental atelectasis, no air trapping, mild stable MONICO GGOs, no ILS thickening, no effusions     Pulmonary function testing:   3/26/2020 - Ratio 79%, FVC 1 75L (47%), FEV1 1 39L (51%), TLC 43%, RV 44%, DLCO 19% - severe restrictive airflow defect, reduced TLC/RV, severely reduced diffusion    9/9/2020 - Ratio 74%, FVC 2 05 (54%), FEV1 1 52L (55%), TLC 60%, RV 75%, DLCO 24% - moderate obstructive airflow defect, reduced lung volumes, severely reduced diffusion     8/26/2020 - Ratio 80%, FVC 2 09L (66%), FEV1 1 67L (72%) - moderate restrictive airflow defect     11/09/18 - Spirometry Ratio 69%, FVC 2 33L (72%), FEV1 1 61L (68%) - mild restrictive airflow defect     9/13/17 - Ratio 74%, FVC 2 55L (78%), FEV1 1 89L (79%) - very mild restrictive ventilatory defect with prior FVC 82% predicted in 2016      Ambulation Testing  8/26/2020 - 6MWT on RA - total walk distance 252 meters, initial SpO2 92%, gus 90%, at conclusion 92%, initial HR 84bpm, maximal HR 89bpm     4/19/19 - Ambulation Testing - On RA - SpO2 91%, ambulated nearly 50 meters with SpO2 increased to 97% - ambulation limited with knee brace and cane     EKG, Pathology, and Other Studies: I have personally reviewed pertinent reports     TTE 9/2017 - EF 60%, normal RV size/function, mild TR, normal PA pressures     TTE 6/2016 EF 76%, grade I diastolic dysfunction, normal RV, mild-mod MR with eccentric posterior jet    Javon Jurado DO, [facility]

## 2021-04-28 ENCOUNTER — PATIENT OUTREACH (OUTPATIENT)
Dept: CASE MANAGEMENT | Facility: HOSPITAL | Age: 71
End: 2021-04-28

## 2021-04-28 ENCOUNTER — OFFICE VISIT (OUTPATIENT)
Dept: PHYSICAL THERAPY | Facility: CLINIC | Age: 71
End: 2021-04-28
Payer: MEDICARE

## 2021-04-28 DIAGNOSIS — R29.898 RIGHT LEG WEAKNESS: Primary | ICD-10-CM

## 2021-04-28 PROCEDURE — 97116 GAIT TRAINING THERAPY: CPT

## 2021-04-28 PROCEDURE — 97530 THERAPEUTIC ACTIVITIES: CPT

## 2021-04-28 PROCEDURE — 97110 THERAPEUTIC EXERCISES: CPT

## 2021-04-28 NOTE — PROGRESS NOTES
Daily Note     Today's date: 2021  Patient name: Patti Dewitt  : 1950  MRN: [id number]  Referring provider: Tez Doyle DO  Dx:   Encounter Diagnosis     ICD-10-CM    1  Right leg weakness  R29 898                   Subjective: Pt reports no changes in balance since last tx session  Objective: See treatment diary below      Assessment: Pt demonstrated continued weakness with SLRs, increased tolerance to functional mobility strengthening  Pt demonstrated right medial heel whip and difficulty limiting R toeing out during swing phase of gait training  Plan: Assess response to tx nv  Precautions: Increased fall risk: Utilize GAIT BELT, cardiac, DM  Dx: R LE weakness after steroid use        Manuals          Laser L ATFL  np np np                                                Neuro Re-Ed                                                                                                        Ther Ex             STS 1x10 3x10 3x10 3x10         LAQ Y/ 1x10 Y/ 3x10 Y/  3x10 4#  3x12         ecc lateral step-downs  6"/  R/ 3x8 6"/  R/ 3x8 6"/  R/ 3x9         SLR 1x10 1x10 2x10 1x10                                                             Ther Activity             Step-ups 1x10 U railiing 3x10 U railiing 6"/  3x10  U railing 6"/  3x10  U railing         step-downs 1x10 U railing 2x10 U railiing 6"/  2x10 U railing 6"/  3x10  U railing         Gait Training             Alter G  50% BW  0 8 mph  10 min 50% BW  0 8 mph  10 min 560% BW  1 0 mph  10 min                      Modalities

## 2021-04-28 NOTE — PROGRESS NOTES
RT contacted [address] [place] [address] to check on status of the  pharmaceutical assistance application  Frederic Meza was approved on 4/26, for Symbicort assistance with [employer] through 12/31/21  Attempt made to contact Radha to let him know  Detailed message left on voicemail with information to complete another application at the end of the year to assure no lapse in coverage

## 2021-04-28 NOTE — PROGRESS NOTES
Thank you for reaching out to the pt  I reached out to the pt as well, but may have forgotten to document  This assistance program will be very helpful for him  Thank you for your assistance as well

## 2021-04-28 NOTE — PROGRESS NOTES
Radha returned my call and expressed gratitude  We discussed the assistance program and he will plan to complete another application near the year end

## 2021-05-03 ENCOUNTER — OFFICE VISIT (OUTPATIENT)
Dept: PHYSICAL THERAPY | Facility: CLINIC | Age: 71
End: 2021-05-03
Payer: MEDICARE

## 2021-05-03 DIAGNOSIS — R29.898 RIGHT LEG WEAKNESS: Primary | ICD-10-CM

## 2021-05-03 PROCEDURE — 97112 NEUROMUSCULAR REEDUCATION: CPT | Performed by: PHYSICAL THERAPIST

## 2021-05-03 PROCEDURE — 97110 THERAPEUTIC EXERCISES: CPT | Performed by: PHYSICAL THERAPIST

## 2021-05-03 NOTE — PROGRESS NOTES
Daily Note     Today's date: 5/3/2021  Patient name: Loretta Sanders  : 1950  MRN: [id number]  Referring provider: Travon Hollingsworth DO  Dx:   Encounter Diagnosis     ICD-10-CM    1  Right leg weakness  R29 898                   Subjective: Pt reports no L ankle pain  Pt reports improved stability when he wears his ankle brace and uses a SPC  Objective: See treatment diary below    Assessment: Pt demonstrated an inability to control hip ER rotation during SLR, replaced with standing SLR  Plan: Cont POC, resume alter G ambulation nv  Precautions: Increased fall risk: Utilize GAIT BELT, cardiac, DM  Dx: R LE weakness after steroid use        Manuals 4/13 4/19 4/22 4/28 5/3        Laser L ATFL  np np np D/c                                               Neuro Re-Ed             DLS standing SLR     3x10 ea        DLS standing Hip ABD     3x10 ea                                                                         Ther Ex             STS 1x10 3x10 3x10 3x10 3x10        LAQ Y/ 1x10 Y/ 3x10 Y/  3x10 4#  3x12 5#/ 3x10 6#       ecc lateral step-downs  6"/  R/ 3x8 6"/  R/ 3x8 6"/  R/ 3x9 6" R/ 3x10        SLR 1x10 1x10 2x10 1x10 Held                                                             Ther Activity             Step-ups 1x10 U railiing 3x10 U railiing 6"/  3x10  U railing 6"/  3x10  U railing 6"/ 3x10 U railing        step-downs 1x10 U railing 2x10 U railiing 6"/  2x10 U railing 6"/  3x10  U railing 6"/ 3x10 U railing        Gait Training             Alter G  50% BW  0 8 mph  10 min 50% BW  0 8 mph  10 min 60% BW  1 0 mph  10 min resume nv                     Modalities

## 2021-05-06 ENCOUNTER — OFFICE VISIT (OUTPATIENT)
Dept: PHYSICAL THERAPY | Facility: CLINIC | Age: 71
End: 2021-05-06
Payer: MEDICARE

## 2021-05-06 DIAGNOSIS — R29.898 RIGHT LEG WEAKNESS: Primary | ICD-10-CM

## 2021-05-06 PROCEDURE — 97110 THERAPEUTIC EXERCISES: CPT

## 2021-05-06 PROCEDURE — 97112 NEUROMUSCULAR REEDUCATION: CPT

## 2021-05-06 PROCEDURE — 97530 THERAPEUTIC ACTIVITIES: CPT

## 2021-05-06 NOTE — PROGRESS NOTES
Daily Note     Today's date: 2021  Patient name: Donovan Duverney  : 1950  MRN: [id number]  Referring provider: Tapan Arreaga DO  Dx:   Encounter Diagnosis     ICD-10-CM    1  Right leg weakness  R29 898                   Subjective: Pt reports feeling increased B LE strength  Objective: See treatment diary below    Assessment: Pt demonstrated difficulty with ecc quad control during step activity  No hip ER with standing SLR  Pt able to decrease R toe out during gait on AlterG  Plan: Cont POC  Precautions: Increased fall risk: Utilize GAIT BELT, cardiac, DM  Dx: R LE weakness after steroid use        Manuals 4/13 4/19 4/22 4/28 5/3 5/6       Laser L ATFL  np np np D/c                                               Neuro Re-Ed             DLS standing SLR     3x10 ea 3x10 ea       DLS standing Hip ABD     3x10 ea 3x10 ea                                                                        Ther Ex             STS 1x10 3x10 3x10 3x10 3x10 3x10       LAQ Y/ 1x10 Y/ 3x10 Y/  3x10 4#  3x12 5#/ 3x10 6#  3x10       ecc lateral step-downs  6"/  R/ 3x8 6"/  R/ 3x8 6"/  R/ 3x9 6" R/ 3x10 6" R/  3x10       SLR 1x10 1x10 2x10 1x10 Held                                                             Ther Activity             Step-ups 1x10 U railiing 3x10 U railiing 6"/  3x10  U railing 6"/  3x10  U railing 6"/ 3x10 U railing 6"/  3x10  U railing       step-downs 1x10 U railing 2x10 U railiing 6"/  2x10 U railing 6"/  3x10  U railing 6"/ 3x10 U railing 6"/  3x10  U railing       Gait Training             Alter G  50% BW  0 8 mph  10 min 50% BW  0 8 mph  10 min 60% BW  1 0 mph  10 min resume nv 60% BW   1 0 mph  10 min                    Modalities

## 2021-05-12 ENCOUNTER — OFFICE VISIT (OUTPATIENT)
Dept: PHYSICAL THERAPY | Facility: CLINIC | Age: 71
End: 2021-05-12
Payer: MEDICARE

## 2021-05-12 DIAGNOSIS — R29.898 RIGHT LEG WEAKNESS: Primary | ICD-10-CM

## 2021-05-12 PROCEDURE — 97110 THERAPEUTIC EXERCISES: CPT

## 2021-05-12 PROCEDURE — 97530 THERAPEUTIC ACTIVITIES: CPT

## 2021-05-12 PROCEDURE — 97112 NEUROMUSCULAR REEDUCATION: CPT

## 2021-05-12 NOTE — PROGRESS NOTES
Daily Note     Today's date: 2021  Patient name: Jayden Huber  : 1950  MRN: [id number]  Referring provider: Valdez Villa DO  Dx:   Encounter Diagnosis     ICD-10-CM    1  Right leg weakness  R29 898                   Subjective: Pt reports no change in right leg strength since last tx session  Objective: See treatment diary below    Assessment: Pt demonstrated increased stair tolerance and increased WB tolerance with Alter G ambulation  Plan: Cont POC as per PT  Precautions: Increased fall risk: Utilize GAIT BELT, cardiac, DM  Dx: R LE weakness after steroid use        Manuals 4/13 4/19 4/22 4/28 5/3 5/6 5/12      Laser L ATFL  np np np D/c                                               Neuro Re-Ed             DLS standing SLR     3x10 ea 3x10 ea 4x10 ea      DLS standing Hip ABD     3x10 ea 3x10 ea 4x10 ea                                                                       Ther Ex             STS 1x10 3x10 3x10 3x10 3x10 3x10 3x10      LAQ Y/ 1x10 Y/ 3x10 Y/  3x10 4#  3x12 5#/ 3x10 6#  3x10 6#  3x10      ecc lateral step-downs  6"/  R/ 3x8 6"/  R/ 3x8 6"/  R/ 3x9 6" R/ 3x10 6" R/  3x10 6" R/  3x10      SLR 1x10 1x10 2x10 1x10 Held                                                             Ther Activity             Step-ups 1x10 U railiing 3x10 U railiing 6"/  3x10  U railing 6"/  3x10  U railing 6"/ 3x10 U railing 6"/  3x10  U railing 6"/  3x12  U railing      step-downs 1x10 U railing 2x10 U railiing 6"/  2x10 U railing 6"/  3x10  U railing 6"/ 3x10 U railing 6"/  3x10  U railing 6"/  3x12  U railing      Gait Training             Alter G  50% BW  0 8 mph  10 min 50% BW  0 8 mph  10 min 60% BW  1 0 mph  10 min resume nv 60% BW   1 0 mph  10 min 65% BW   1 0 mph  10 min                   Modalities

## 2021-05-17 ENCOUNTER — OFFICE VISIT (OUTPATIENT)
Dept: PHYSICAL THERAPY | Facility: CLINIC | Age: 71
End: 2021-05-17
Payer: MEDICARE

## 2021-05-17 DIAGNOSIS — R29.898 RIGHT LEG WEAKNESS: Primary | ICD-10-CM

## 2021-05-17 PROCEDURE — 97110 THERAPEUTIC EXERCISES: CPT

## 2021-05-17 PROCEDURE — 97530 THERAPEUTIC ACTIVITIES: CPT

## 2021-05-17 PROCEDURE — 97112 NEUROMUSCULAR REEDUCATION: CPT

## 2021-05-17 NOTE — PROGRESS NOTES
Daily Note     Today's date: 2021  Patient name: Loretta Sanders  : 1950  MRN: [id number]  Referring provider: Travon Hollingsworth DO  Dx:   Encounter Diagnosis     ICD-10-CM    1  Right leg weakness  R29 898                   Subjective: Pt reports no changes since last visit and no falls  Objective: See treatment diary below  Began instructing and educating pt on HEP in preparation for D/C  Assessment: Pt demonstrated improved eccentric quad control with STS  Pt demonstrated improved tolerance to and safety with steps with no assist from therapist  Pt is agreeable d/c'ing to HEP as long as he demonstrates understanding of HEP and safety nv  Plan: Cont POC as per PT  Begin D/C planning to HEP for next visit  Gait training outside for uneven surfaces nv  Precautions: Increased fall risk: Utilize GAIT BELT, cardiac, DM  Dx: R LE weakness after steroid use        Manuals 4/13 4/19 4/22 4/28 5/3 5/6 5/12 5/17     Laser L ATFL  np np np D/c                                               Neuro Re-Ed             DLS standing SLR     3x10 ea 3x10 ea 4x10 ea 4x10 ea     DLS standing Hip ABD     3x10 ea 3x10 ea 4x10 ea 4x10 ea                                                                      Ther Ex             STS 1x10 3x10 3x10 3x10 3x10 3x10 3x10 3x10     LAQ Y/ 1x10 Y/ 3x10 Y/  3x10 4#  3x12 5#/ 3x10 6#  3x10 6#  3x10 RTB  3x10     ecc lateral step-downs  6"/  R/ 3x8 6"/  R/ 3x8 6"/  R/ 3x9 6" R/ 3x10 6" R/  3x10 6" R/  3x10 6"/  R/  2x12     SLR 1x10 1x10 2x10 1x10 Held                                                             Ther Activity             Step-ups 1x10 U railiing 3x10 U railiing 6"/  3x10  U railing 6"/  3x10  U railing 6"/ 3x10 U railing 6"/  3x10  U railing 6"/  3x12  U railing 6"/  3x12  U railing     step-downs 1x10 U railing 2x10 U railiing 6"/  2x10 U railing 6"/  3x10  U railing 6"/ 3x10 U railing 6"/  3x10  U railing 6"/  3x12  U railing 6"/  3x12  U railing     Gait Training             Alter G  50% BW  0 8 mph  10 min 50% BW  0 8 mph  10 min 60% BW  1 0 mph  10 min resume nv 60% BW   1 0 mph  10 min 65% BW   1 0 mph  10 min 65%  BW  1 0 mph   10 min                  Modalities

## 2021-05-20 ENCOUNTER — OFFICE VISIT (OUTPATIENT)
Dept: PHYSICAL THERAPY | Facility: CLINIC | Age: 71
End: 2021-05-20
Payer: MEDICARE

## 2021-05-20 ENCOUNTER — TELEPHONE (OUTPATIENT)
Dept: CARDIOLOGY CLINIC | Facility: CLINIC | Age: 71
End: 2021-05-20

## 2021-05-20 DIAGNOSIS — I10 ESSENTIAL HYPERTENSION: ICD-10-CM

## 2021-05-20 DIAGNOSIS — R29.898 RIGHT LEG WEAKNESS: Primary | ICD-10-CM

## 2021-05-20 PROCEDURE — 97116 GAIT TRAINING THERAPY: CPT

## 2021-05-20 PROCEDURE — 97530 THERAPEUTIC ACTIVITIES: CPT

## 2021-05-20 PROCEDURE — 97112 NEUROMUSCULAR REEDUCATION: CPT

## 2021-05-20 PROCEDURE — 97110 THERAPEUTIC EXERCISES: CPT

## 2021-05-20 RX ORDER — DILTIAZEM HYDROCHLORIDE 240 MG/1
240 CAPSULE, COATED, EXTENDED RELEASE ORAL DAILY
Qty: 90 CAPSULE | Refills: 0 | Status: SHIPPED | OUTPATIENT
Start: 2021-05-20 | End: 2021-08-12

## 2021-05-20 NOTE — PROGRESS NOTES
Daily Note     Today's date: 2021  Patient name: Zuleyma Dubois  : 1950  MRN: [id number]  Referring provider: Malgorzata Vickers DO  Dx:   Encounter Diagnosis     ICD-10-CM    1  Right leg weakness  [id number]                   Subjective: Pt reports no changes since last visit and no falls  Objective: See treatment diary below  Provided HEP and bands for D/C  Assessment: Pt demonstrated safety ambulating outdoors on grass, hills and curbs with no assist from therapist  Demonstrates good form with TE and verbalizes understanding of HEP provided for D/C to continue strengthening at home  Plan: Pt is appropriate for D/C to HEP at this time  Precautions: Increased fall risk: Utilize GAIT BELT, cardiac, DM  Dx: R LE weakness after steroid use        Manuals 4/13 4/19 4/22 4/28 5/3 5/6 5/12 5/17 5/20    Laser L ATFL  np np np D/c                                               Neuro Re-Ed             DLS standing SLR     3x10 ea 3x10 ea 4x10 ea 4x10 ea 3x10  ea    DLS standing Hip ABD     3x10 ea 3x10 ea 4x10 ea 4x10 ea 3x10 ea                                                                     Ther Ex             STS 1x10 3x10 3x10 3x10 3x10 3x10 3x10 3x10 3x10    LAQ Y/ 1x10 Y/ 3x10 Y/  3x10 4#  3x12 5#/ 3x10 6#  3x10 6#  3x10 RTB  3x10 RTB  3x10    ecc lateral step-downs  6"/  R/ 3x8 6"/  R/ 3x8 6"/  R/ 3x9 6" R/ 3x10 6" R/  3x10 6" R/  3x10 6"/  R/  2x12 6"/  3x10    SLR 1x10 1x10 2x10 1x10 Held                                                             Ther Activity             Step-ups 1x10 U railiing 3x10 U railiing 6"/  3x10  U railing 6"/  3x10  U railing 6"/ 3x10 U railing 6"/  3x10  U railing 6"/  3x12  U railing 6"/  3x12  U railing 6"/  3x12 U railing    step-downs 1x10 U railing 2x10 U railiing 6"/  2x10 U railing 6"/  3x10  U railing 6"/ 3x10 U railing 6"/  3x10  U railing 6"/  3x12  U railing 6"/  3x12  U railing 6"/ 3x12  U railing    Gait Training             Alter G  50% BW  0 8 mph  10 min 50% BW  0 8 mph  10 min 60% BW  1 0 mph  10 min resume nv 60% BW   1 0 mph  10 min 65% BW   1 0 mph  10 min 65%  BW  1 0 mph   10 min outside, grass, curbs, hills  15 min                 Modalities

## 2021-05-22 ENCOUNTER — APPOINTMENT (OUTPATIENT)
Dept: LAB | Facility: CLINIC | Age: 71
End: 2021-05-22
Payer: MEDICARE

## 2021-05-22 DIAGNOSIS — R29.898 RIGHT LEG WEAKNESS: ICD-10-CM

## 2021-05-24 ENCOUNTER — CLINICAL SUPPORT (OUTPATIENT)
Dept: INTERNAL MEDICINE CLINIC | Facility: CLINIC | Age: 71
End: 2021-05-24

## 2021-05-24 DIAGNOSIS — E11.8 TYPE 2 DIABETES MELLITUS WITH COMPLICATION (HCC): Primary | ICD-10-CM

## 2021-05-24 NOTE — PROGRESS NOTES
[facility]  Aurelio Mark, PharmD, BCACP      Reason for visit: Appointment with 65y o  year old for management of T2DM  This virtual check-in was done via phone  Encounter provider: Refugio Carpenter    Patient agrees to participate in a virtual check in via telephone or video visit instead of presenting to the office to address urgent/immediate medical needs  After connecting through telephone, the patient was identified by name and date of birth  Mike Alexander was informed that this was a telemedicine visit and that the exam was being conducted confidentially over secure lines  My office door was closed  No one else was in the room  Mike Alexander acknowledged consent and understanding of privacy and security of the telemedicine visit  I informed the patient that I have reviewed He record in Epic and presented the opportunity for He to ask any questions regarding the visit today  The patient agreed to participate  Current DM Regimen:   Metformin XR   Insulin NPH      ASSESSMENT/PLAN                                                                                     1  Type 2 diabetes: goal A1c < 7 5% based on ADA guidelines; could also consider < 8%  Most recent A1c slightly above goal but patient was started on steroid  FBG readings below goal, post-supper BG to goal  Denies hypoglycemia  BMP acceptable but with hypoglycemia   No serum B12 while on metformin      Duration: > 20 years  Microvascular complications: microalbuminuria  Macrovascular complications: CABG  (Yes ) Aspirin (No) Statin, allergy x3 statins (Yes ) ACEI/ARB  Eye Exam:  2018   Foot Exam: 10/2020     Most recent labs and diabetes goals discussed with patient in clinic today   MEDICATIONS: If PCP is in agreeance, will have patient continue metformin 2gm/day; reduce insulin NPH to 10 units noon   SMBx/day; patient will write down readings on calendar -2 weeks prior to appt   CM referral placed for diabetes care at home   Symbicort: discussion on need to take 2 puffs BID, voiced understanding and will try doing so    Follow-Up: 6 weeks virtual, will try to utilize [facility] to be in home for med rec during next PharmD appt  _x_ Home Monitoring Records, BG      SUBJECTIVE                                                                                                              1  Medication Adherence: Medication list reviewed with patient, reports the following discrepancies/problems:   Insulin NPH: taking as prescribed   Albuterol: uses infrequently during the week, unable to quantify how often but did not think he could use since he now has Symbicort   Benzonatate: does not feel like rx helped and not taking now   Robitussin: currently taking 1/2 cup in HS, feels this alleviated cough s/sx better than benzonatate   Symbicort: only has been taking 1 puff HS, denies taking BID   Diazepam: takes PRN when anxiety    2  Medication Efficacy:    Review of Systems   Respiratory: Positive for cough (improved since taking Symbicort but continued to cough during phone appt) and shortness of breath (occasionally)  Cardiovascular: Negative for leg swelling  Gastrointestinal: Negative for constipation, diarrhea, nausea and vomiting  Musculoskeletal: Positive for back pain (still somewhat present since stopping prednisone)  Still has some leg weakness since stopping prednisone     Hypoglycemia history: denies SMBG readings < 70mg/dL since last appt   Unsymptomatic hypoglycemia: No, reports hypoglycemia s/sx x0 since last appt    3  Lifestyle: Denies changes since last appt  Eats relatively small portion sizes      Breakfast: oatmeal + sunflower seeds + honey  Lunch: cake + coffee  Supper: 1 bowl rice + protein    OBJECTIVE                                                                                                      FBG Average: 83 mg/dl (71-95 mg/dl), n=14, 0/14= < 70 mg/dl   Pre-Supper BG Average: 163 mg/dl ( mg/dl), n=11, 0/11= < 70 mg/dl     Pertinent Lab Data:  *was fasting for labs*  Lab Results   Component Value Date    SODIUM 136 01/21/2021    K 4 5 01/21/2021    CL 99 (L) 01/21/2021    CO2 31 01/21/2021    BUN 28 (H) 01/21/2021    CREATININE 0 91 01/21/2021    GLUC 95 06/04/2019    CALCIUM 9 2 01/21/2021     Lab Results   Component Value Date    HGBA1C 7 8 (H) 01/21/2021     No results found for: [id number]    Microalbumin/Creatinine: 94 (1/2021)    Demographics  Interaction Method: Phone  Type of Intervention: Follow-Up    Topic(s) Addressed  Diabetes    Intervention(s) Made    Pharmacologic:      Medication Adjustment - Dose or Frequency: insulin    Non-Pharmacologic:  Adherence Addressed    Other    Tool(s) Used  Not Applicable    Time Spent:   Time Spent in Direct Patient Care: 15 minutes    Time Spent in Care Coordination: 15 minutes    Recommendation(s) Accepted by the Patient/Caregiver:  All Accepted

## 2021-05-25 ENCOUNTER — PATIENT OUTREACH (OUTPATIENT)
Dept: INTERNAL MEDICINE CLINIC | Facility: CLINIC | Age: 71
End: 2021-05-25

## 2021-05-25 ENCOUNTER — APPOINTMENT (OUTPATIENT)
Dept: PHYSICAL THERAPY | Facility: CLINIC | Age: 71
End: 2021-05-25
Payer: MEDICARE

## 2021-05-25 NOTE — PROGRESS NOTES
Spoke with patient he is doing good  Feels better with warmer weather  He is able to get to appointments  Uses a [place] remedy for his blood sugars along with his diabetic medications  He does not want an eye exam yet due to [place] will call when he feels its a bit safer  He does not want vaccine at this time  Does not trust it  No care management needs identified at this time

## 2021-05-26 ENCOUNTER — APPOINTMENT (OUTPATIENT)
Dept: LAB | Facility: CLINIC | Age: 71
End: 2021-05-26
Payer: MEDICARE

## 2021-05-26 DIAGNOSIS — R79.89 ELEVATED CORTISOL LEVEL: Primary | ICD-10-CM

## 2021-05-26 DIAGNOSIS — I25.10 CAD IN NATIVE ARTERY: ICD-10-CM

## 2021-05-26 LAB
CK MB SERPL-MCNC: 1.7 % (ref 0–2.5)
CK MB SERPL-MCNC: 3.5 NG/ML (ref 0–5)
CK SERPL-CCNC: 207 U/L (ref 39–308)
CORTIS AM PEAK SERPL-MCNC: 23.3 UG/DL (ref 4.2–22.4)
ERYTHROCYTE [SEDIMENTATION RATE] IN BLOOD: 19 MM/HOUR (ref 0–19)

## 2021-05-26 PROCEDURE — 36415 COLL VENOUS BLD VENIPUNCTURE: CPT

## 2021-05-26 PROCEDURE — 82085 ASSAY OF ALDOLASE: CPT

## 2021-05-26 PROCEDURE — 82533 TOTAL CORTISOL: CPT

## 2021-05-26 PROCEDURE — 82550 ASSAY OF CK (CPK): CPT

## 2021-05-26 PROCEDURE — 82553 CREATINE MB FRACTION: CPT

## 2021-05-26 PROCEDURE — 85652 RBC SED RATE AUTOMATED: CPT

## 2021-05-26 RX ORDER — DIAZEPAM 5 MG/1
5 TABLET ORAL EVERY 8 HOURS PRN
Qty: 90 TABLET | Refills: 1 | Status: SHIPPED | OUTPATIENT
Start: 2021-05-26

## 2021-05-27 ENCOUNTER — APPOINTMENT (OUTPATIENT)
Dept: PHYSICAL THERAPY | Facility: CLINIC | Age: 71
End: 2021-05-27
Payer: MEDICARE

## 2021-05-27 LAB — ALDOLASE SERPL-CCNC: 6.4 U/L (ref 3.3–10.3)

## 2021-06-15 ENCOUNTER — OFFICE VISIT (OUTPATIENT)
Dept: INTERNAL MEDICINE CLINIC | Facility: CLINIC | Age: 71
End: 2021-06-15
Payer: MEDICARE

## 2021-06-15 VITALS
HEART RATE: 74 BPM | OXYGEN SATURATION: 90 % | RESPIRATION RATE: 16 BRPM | WEIGHT: 103.8 LBS | HEIGHT: 66 IN | BODY MASS INDEX: 16.68 KG/M2 | DIASTOLIC BLOOD PRESSURE: 68 MMHG | SYSTOLIC BLOOD PRESSURE: 122 MMHG

## 2021-06-15 DIAGNOSIS — E11.9 TYPE 2 DIABETES MELLITUS WITHOUT COMPLICATION, WITHOUT LONG-TERM CURRENT USE OF INSULIN (HCC): ICD-10-CM

## 2021-06-15 DIAGNOSIS — F43.22 ADJUSTMENT DISORDER WITH ANXIOUS MOOD: ICD-10-CM

## 2021-06-15 DIAGNOSIS — R63.4 WEIGHT LOSS: ICD-10-CM

## 2021-06-15 DIAGNOSIS — J84.9 INTERSTITIAL LUNG DISEASE (HCC): ICD-10-CM

## 2021-06-15 DIAGNOSIS — E11.8 TYPE 2 DIABETES MELLITUS WITH COMPLICATION (HCC): ICD-10-CM

## 2021-06-15 DIAGNOSIS — I10 ESSENTIAL HYPERTENSION: ICD-10-CM

## 2021-06-15 DIAGNOSIS — R29.898 RIGHT LEG WEAKNESS: ICD-10-CM

## 2021-06-15 DIAGNOSIS — E11.8 TYPE 2 DIABETES MELLITUS WITH COMPLICATION (HCC): Primary | ICD-10-CM

## 2021-06-15 PROCEDURE — 99214 OFFICE O/P EST MOD 30 MIN: CPT | Performed by: INTERNAL MEDICINE

## 2021-06-15 RX ORDER — INSULIN HUMAN 100 [IU]/ML
INJECTION, SUSPENSION SUBCUTANEOUS
Qty: 15 ML | Refills: 1 | Status: SHIPPED | OUTPATIENT
Start: 2021-06-15 | End: 2021-07-08

## 2021-06-15 RX ORDER — METFORMIN HYDROCHLORIDE 500 MG/1
1000 TABLET, EXTENDED RELEASE ORAL
Qty: 360 TABLET | Refills: 1
Start: 2021-06-15 | End: 2021-07-08

## 2021-06-15 NOTE — ASSESSMENT & PLAN NOTE
Lab Results   Component Value Date    HGBA1C 7 8 (H) 01/21/2021    target A1c under 7 did recommend eye examination patient is still concerned to see GI doctor because of COVID concerns  I will be ordering diabetic laboratories including comprehensive metabolic panel, hemoglobin A1c, urine microalbumin, lipid panel  Not able to tolerate the higher dose of metformin because of diarrhea he has gone back to his usual dose of insulin tolerating well blood sugars are good    Did ask patient to gain 5 lb

## 2021-06-15 NOTE — ASSESSMENT & PLAN NOTE
He is plane to moved to [place] in the near future with his wife and daughter he reports me symptoms of anxiety decreased appetite secondary to anxiety at this  He does not want a medicine treatment or counseling no SI he will monitor symptoms he will work on increasing his diet appetite I did ask him to increase his weight by 5 lb  If any change or worsening symptoms he will notify me

## 2021-06-15 NOTE — ASSESSMENT & PLAN NOTE
Secondary to anxiety increase caloric intake increase weight patient is motivated to make change I have encouraged him to go for his colonoscopy he will be getting in touch with colon rectal Dr   Will continue monitor    RTO in 4 weeks call if any problems

## 2021-06-15 NOTE — PROGRESS NOTES
Assessment/Plan:    Type 2 diabetes mellitus with complication (HCC)    Lab Results   Component Value Date    HGBA1C 7 8 (H) 01/21/2021    target A1c under 7 did recommend eye examination patient is still concerned to see GI doctor because of COVID concerns  I will be ordering diabetic laboratories including comprehensive metabolic panel, hemoglobin A1c, urine microalbumin, lipid panel  Not able to tolerate the higher dose of metformin because of diarrhea he has gone back to his usual dose of insulin tolerating well blood sugars are good  Did ask patient to gain 5 lb    Interstitial lung disease ([address] )   Currently stable follow-up with pulmonary    Essential hypertension   Hypertension - controlled, I have counseled patient following healthy balance diet, I would like the patient reduce sodium, exercise routinely, I would like the patient continued the med current medical regiment and we will continue to monitor  Right leg weakness   Improved/steroid related he has undergone therapy his strength has improved    Dyslipidemia   Hyperlipidemia controlled continue with current medical regiment recommend a low-cholesterol diet and recommend routine exercise we will continue to monitor the progress  Adjustment disorder with anxious mood   He is plane to moved to [place] in the near future with his wife and daughter he reports me symptoms of anxiety decreased appetite secondary to anxiety at this  He does not want a medicine treatment or counseling no SI he will monitor symptoms he will work on increasing his diet appetite I did ask him to increase his weight by 5 lb  If any change or worsening symptoms he will notify me  Weight loss   Secondary to anxiety increase caloric intake increase weight patient is motivated to make change I have encouraged him to go for his colonoscopy he will be getting in touch with colon rectal Dr   Will continue monitor    RTO in 4 weeks call if any problems         Problem List Items Addressed This Visit        Endocrine    Type 2 diabetes mellitus with complication ([facility] [address] ) - Primary       Lab Results   Component Value Date    HGBA1C 7 8 (H) 01/21/2021    target A1c under 7 did recommend eye examination patient is still concerned to see GI doctor because of COVID concerns  I will be ordering diabetic laboratories including comprehensive metabolic panel, hemoglobin A1c, urine microalbumin, lipid panel  Not able to tolerate the higher dose of metformin because of diarrhea he has gone back to his usual dose of insulin tolerating well blood sugars are good  Did ask patient to gain 5 lb         Relevant Medications    metFORMIN (GLUCOPHAGE-XR) 500 mg 24 hr tablet    insulin isophane (HumuLIN N,NovoLIN N) 100 units/mL injection pen       Respiratory    Interstitial lung disease (HCC)      Currently stable follow-up with pulmonary            Cardiovascular and Mediastinum    Essential hypertension      Hypertension - controlled, I have counseled patient following healthy balance diet, I would like the patient reduce sodium, exercise routinely, I would like the patient continued the med current medical regiment and we will continue to monitor  Nervous and Auditory    Right leg weakness      Improved/steroid related he has undergone therapy his strength has improved            Other    Adjustment disorder with anxious mood      He is plane to moved to [place] in the near future with his wife and daughter he reports me symptoms of anxiety decreased appetite secondary to anxiety at this  He does not want a medicine treatment or counseling no SI he will monitor symptoms he will work on increasing his diet appetite I did ask him to increase his weight by 5 lb  If any change or worsening symptoms he will notify me           Weight loss      Secondary to anxiety increase caloric intake increase weight patient is motivated to make change I have encouraged him to go for his colonoscopy he will be getting in touch with colon rectal Dr   Will continue monitor  RTO in 4 weeks call if any problems           Other Visit Diagnoses     Type 2 diabetes mellitus without complication, without long-term current use of insulin (HCC)        Relevant Medications    metFORMIN (GLUCOPHAGE-XR) 500 mg 24 hr tablet    insulin isophane (HumuLIN N,NovoLIN N) 100 units/mL injection pen    Other Relevant Orders    Comprehensive metabolic panel    Hemoglobin A1C    Lipid Panel with Direct LDL reflex    Microalbumin / creatinine urine ratio            Subjective:      Patient ID: Mary Wharton is a 65 y o  male  HPI 65-year old male coming in for a follow up visit regarding interstitial lung disease, hypertension, right leg weakness, anxiety, weight loss and type 2 diabetes; the patient does report me decreased appetite secondary to anxiety he noted reports me that he is anxious because of moving to for his wife is purchased a home in [place]   He reports he is nervous to move in the near future  He reports me he is eating but decreased portion secondary to anxiety  He does not want treatment he does not want counseling no SI  Reports me that when he went to the higher dose of metformin  mg 2 tablets twice daily he developed diarrhea and fecal incontinence therefore he needed to reduce the dose of metformin  mg 2 tablets once daily with resolution of the symptoms he needed to increase the insulin to not being on 10 units b i d  His readings are stable he reports reports me since making this adjustment his readings have been averaging 100-120 approximately period he may including the large 2 weeks ago  Diet good and exercizing  Patient did meet with physical therapy he underwent therapy and he has noted improvement in the right leg weakness he continues to hold due home physical therapy      Stress decrease appetite , worried about moving , since the steroid side effect no si    Does report me while visiting [place] in a air-conditioned room he developed allergy like symptoms since coming back home he has been a been taking antihistamine that has been very helpful  Report you he would like to hold off on the vaccine COVID-19 because his mother had recently  from the vaccine and in the past patient has had problems with other vaccines  The following portions of the patient's history were reviewed and updated as appropriate: allergies, current medications, past family history, past medical history, past social history, past surgical history and problem list     Review of Systems   Constitutional: Negative for activity change, appetite change and unexpected weight change  HENT: Negative for congestion and postnasal drip  Eyes: Negative for visual disturbance  Respiratory: Negative for cough and shortness of breath  Cardiovascular: Negative for chest pain  Gastrointestinal: Negative for abdominal pain, diarrhea, nausea and vomiting  Neurological: Negative for dizziness, light-headedness and headaches  Psychiatric/Behavioral: Negative for suicidal ideas  The patient is nervous/anxious  Objective:    No follow-ups on file  No results found        Allergies   Allergen Reactions    Simvastatin Rash    Atorvastatin Itching     Other reaction(s): Itching    Levofloxacin GI Intolerance     Other reaction(s): GI Upset    Pravastatin Itching     Other reaction(s): Itching       Past Medical History:   Diagnosis Date    3-vessel CAD     Anxiety     CAP (community acquired pneumonia)     Last assessed - 3/15/16    Cardiac murmur     Colon polyp     Diabetes mellitus ([address] )     Disease of thyroid gland     History of poliomyelitis     Hyperlipidemia     Hypertension     Methicillin resistant Staphylococcus aureus infection     Mitral valve insufficiency     Myocardial infarction ([address] )     Nonspecific abnormal results of function study of thyroid     Last assessed - 10/15/14     Past Surgical History:   Procedure Laterality Date    CARDIAC SURGERY  1997    COLONOSCOPY      CORONARY ARTERY BYPASS GRAFT      x3; Last assessed - 9/29/17    IA COLONOSCOPY FLX DX W/COLLJ SPEC WHEN PFRMD N/A 3/27/2019    Procedure: COLONOSCOPY;  Surgeon: Tip Gonzalez MD;  Location: [facility]; Service: Gastroenterology     Current Outpatient Medications on File Prior to Visit   Medication Sig Dispense Refill    albuterol (Ventolin HFA) 90 mcg/act inhaler Inhale 2 puffs every 6 (six) hours as needed for wheezing 18 Inhaler 1    ascorbic acid (VITAMIN C) 500 MG tablet Take 500 mg by mouth daily      aspirin (ECOTRIN) 325 mg EC tablet Take 1 tablet by mouth daily      budesonide-formoterol (SYMBICORT) 160-4 5 mcg/act inhaler Inhale 2 puffs 2 (two) times a day Rinse mouth after use   3 Inhaler 3    diazepam (VALIUM) 5 mg tablet Take 1 tablet (5 mg total) by mouth every 8 (eight) hours as needed for anxiety (anxiety) 90 tablet 1    diltiazem (CARDIZEM CD) 240 mg 24 hr capsule Take 1 capsule (240 mg total) by mouth daily 90 capsule 0    ezetimibe (ZETIA) 10 mg tablet TAKE 1 TABLET BY MOUTH EVERY DAY 90 tablet 3    glucose blood test strip Use 1 each 2 (two) times a day Use as instructed 200 each 1    guaiFENesin (ROBITUSSIN) 100 MG/5ML oral liquid Take 100 mg by mouth daily at bedtime       hydrocortisone (ANUSOL-HC) 2 5 % rectal cream INSERT INTO THE RECTUM TWO TIMES A DAY 30 g 4    Insulin Pen Needle (B-D UF III MINI PEN NEEDLES) 31G X 5 MM MISC by Does not apply route daily USE WITH BASAGLAR 100 each 5    levothyroxine 50 mcg tablet TAKE 1 TABLET BY MOUTH EVERY DAY 90 tablet 1    losartan (COZAAR) 50 mg tablet TAKE 1 TABLET BY MOUTH EVERY DAY 90 tablet 3    Omega-3 Fatty Acids (FISH OIL) 1,000 mg Take 1 capsule by mouth daily      OneTouch Delica Lancets 93T MISC Use twice daily 200 each 1    sildenafil (REVATIO) 20 mg tablet TAKE 1 TABLET BY MOUTH EVERY DAY AS NEEDED (Patient not taking: Reported on 2021) 90 tablet 1    VITAMIN B COMPLEX-C PO Take 1 tablet by mouth daily      [DISCONTINUED] insulin isophane (HumuLIN N,NovoLIN N) 100 units/mL injection pen Inject 10 units in the morning and 6 units in the evening For diabetes - dispense Relion Pens 15 mL 1    [DISCONTINUED] metFORMIN (GLUCOPHAGE-XR) 500 mg 24 hr tablet Take 2 tablets (1,000 mg total) by mouth 2 (two) times a day For diabetes 360 tablet 1     Current Facility-Administered Medications on File Prior to Visit   Medication Dose Route Frequency Provider Last Rate Last Admin    albuterol (ACCUNEB) nebulizer solution 1 25 mg  1 25 mg Nebulization Q6H PRN Marv Delcid MD         Family History   Adopted: Yes   Problem Relation Age of Onset    Diabetes Family     No Known Problems Mother      Social History     Socioeconomic History    Marital status: /Civil Union     Spouse name: Not on file    Number of children: Not on file    Years of education: Not on file    Highest education level: Not on file   Occupational History    Occupation: Retired   Tobacco Use    Smoking status: Former Smoker     Packs/day: 0 50     Years: 30 00     Pack years: 15 00     Types: Cigarettes     Quit date:      Years since quittin 4    Smokeless tobacco: Never Used    Tobacco comment:    Vaping Use    Vaping Use: Never used   Substance and Sexual Activity    Alcohol use: No    Drug use: No    Sexual activity: Not Currently   Other Topics Concern    Not on file   Social History Narrative    Exercising regularly    Living with significant other     Social Determinants of Health     Financial Resource Strain:     Difficulty of Paying Living Expenses:    Food Insecurity:     Worried About Running Out of Food in the Last Year:     Ran Out of Food in the Last Year:    Transportation Needs:     Lack of Transportation (Medical):      Lack of Transportation (Non-Medical):    Physical Activity:     Days of Exercise per Week:     Minutes of Exercise per Session:    Stress:     Feeling of Stress :    Social Connections:     Frequency of Communication with Friends and Family:     Frequency of Social Gatherings with Friends and Family:     Attends Yazidi Services:     Active Member of Clubs or Organizations:     Attends Club or Organization Meetings:     Marital Status:    Intimate Partner Violence:     Fear of Current or Ex-Partner:     Emotionally Abused:     Physically Abused:     Sexually Abused:      Vitals:    06/15/21 0823   BP: 122/68   Pulse: 74   Resp: 16   SpO2: 90%   Weight: 47 1 kg (103 lb 12 8 oz)   Height: 5' 6" (1 676 m)     Results for orders placed or performed in visit on 05/22/21   Cortisol Level, AM Specimen   Result Value Ref Range    Cortisol - AM 23 3 (H) 4 2 - 22 4 ug/dL   Sedimentation rate, automated   Result Value Ref Range    Sed Rate 19 0 - 19 mm/hour   Aldolase   Result Value Ref Range    Aldolase 6 4 3 3 - 10 3 U/L   CK (with reflex to MB)   Result Value Ref Range    Total  39 - 308 U/L   CKMB   Result Value Ref Range    CK-MB Index 1 7 0 0 - 2 5 %    CK-MB 3 5 0 0 - 5 0 ng/mL     Weight (last 2 days)     Date/Time   Weight    06/15/21 0823   47 1 (103 8)            Body mass index is 16 75 kg/m²  /68 (06/15/21 0823)    Temp      Pulse 74 (06/15/21 0823)   Resp 16 (06/15/21 0823)    SpO2 90 % (06/15/21 0823)      Vitals:    06/15/21 0823   Weight: 47 1 kg (103 lb 12 8 oz)     Vitals:    06/15/21 0823   Weight: 47 1 kg (103 lb 12 8 oz)       /68   Pulse 74   Resp 16   Ht 5' 6" (1 676 m)   Wt 47 1 kg (103 lb 12 8 oz)   SpO2 90%   BMI 16 75 kg/m²          Physical Exam  Constitutional:       General: He is not in acute distress  Appearance: He is well-developed  He is not diaphoretic  HENT:      Head: Normocephalic and atraumatic  Right Ear: External ear normal       Left Ear: External ear normal    Eyes:      General: No scleral icterus          Right eye: No discharge  Left eye: No discharge  Conjunctiva/sclera: Conjunctivae normal       Pupils: Pupils are equal, round, and reactive to light  Cardiovascular:      Rate and Rhythm: Normal rate and regular rhythm  Heart sounds: Normal heart sounds  No murmur heard  No friction rub  No gallop  Pulmonary:      Effort: No respiratory distress  Breath sounds: No wheezing or rales  Abdominal:      General: Bowel sounds are normal  There is no distension  Palpations: Abdomen is soft  There is no mass  Tenderness: There is no abdominal tenderness  There is no guarding or rebound  Musculoskeletal:         General: No deformity  Cervical back: Neck supple  Lymphadenopathy:      Cervical: No cervical adenopathy  Neurological:      Mental Status: He is alert  Psychiatric:         Mood and Affect: Mood is anxious  Mood is not depressed  Thought Content: Thought content does not include suicidal ideation

## 2021-06-18 DIAGNOSIS — R05.9 COUGH: ICD-10-CM

## 2021-06-18 RX ORDER — ALBUTEROL SULFATE 90 UG/1
2 AEROSOL, METERED RESPIRATORY (INHALATION) EVERY 6 HOURS PRN
Qty: 18 G | Refills: 1 | Status: SHIPPED | OUTPATIENT
Start: 2021-06-18 | End: 2021-08-09

## 2021-06-24 ENCOUNTER — OFFICE VISIT (OUTPATIENT)
Dept: CARDIOLOGY CLINIC | Facility: CLINIC | Age: 71
End: 2021-06-24
Payer: MEDICARE

## 2021-06-24 VITALS
DIASTOLIC BLOOD PRESSURE: 52 MMHG | OXYGEN SATURATION: 93 % | BODY MASS INDEX: 17.97 KG/M2 | HEART RATE: 70 BPM | SYSTOLIC BLOOD PRESSURE: 122 MMHG | HEIGHT: 66 IN | WEIGHT: 111.8 LBS

## 2021-06-24 DIAGNOSIS — I25.10 CORONARY ARTERY DISEASE INVOLVING NATIVE CORONARY ARTERY OF NATIVE HEART WITHOUT ANGINA PECTORIS: ICD-10-CM

## 2021-06-24 DIAGNOSIS — E78.5 DYSLIPIDEMIA: ICD-10-CM

## 2021-06-24 DIAGNOSIS — I10 ESSENTIAL HYPERTENSION: Primary | ICD-10-CM

## 2021-06-24 DIAGNOSIS — I34.0 MITRAL VALVE INSUFFICIENCY, UNSPECIFIED ETIOLOGY: ICD-10-CM

## 2021-06-24 DIAGNOSIS — Z95.1 S/P CABG (CORONARY ARTERY BYPASS GRAFT): ICD-10-CM

## 2021-06-24 PROCEDURE — 99214 OFFICE O/P EST MOD 30 MIN: CPT | Performed by: INTERNAL MEDICINE

## 2021-06-24 NOTE — PROGRESS NOTES
Cardiology   Oneyda Shelby 65 y o  male MRN: [id number]        Impression:  1  Coronary artery disease s/p CABG - stable  2  Hypertension - good control  3  Mild to moderate mitral regurgitation - stable  4  Dyslipidemia - on statin        Recommendations:  1  Continue current medications  2  Follow up in 6 months           HPI: Oneyda Shelby is a 65y o  year old male with coronary artery disease, hypertension, and dyslipidemia presents for follow up  Asymptomatic from a cardiac standpoint  No chest pain, shortness of breath, or palpitations  No chest pain, shortness of breath, or palpitations  Echocardiogram 9/28/17 demonstrated normal left ventricular systolic function and mild mitral regurgitation - no change  Had fluid retention with steroid usage due to COPD  Feels better since stopping steroids  Is moving to [place]  Review of Systems   Constitutional: Negative  HENT: Negative  Eyes: Negative  Respiratory: Positive for shortness of breath  Negative for chest tightness  Cardiovascular: Negative for chest pain, palpitations and leg swelling  Gastrointestinal: Negative  Endocrine: Negative  Genitourinary: Negative  Musculoskeletal: Negative  Skin: Negative  Allergic/Immunologic: Negative  Neurological: Positive for weakness  Hematological: Negative  Psychiatric/Behavioral: Negative  All other systems reviewed and are negative          Past Medical History:   Diagnosis Date    3-vessel CAD     Anxiety     CAP (community acquired pneumonia)     Last assessed - 3/15/16    Cardiac murmur     Colon polyp     Diabetes mellitus ([address] )     Disease of thyroid gland     History of poliomyelitis     Hyperlipidemia     Hypertension     Methicillin resistant Staphylococcus aureus infection     Mitral valve insufficiency     [facility] [address]     Nonspecific abnormal results of function study of thyroid     Last assessed - 10/15/14     Past Surgical History:   Procedure Laterality Date    CARDIAC SURGERY      COLONOSCOPY      CORONARY ARTERY BYPASS GRAFT      x3; Last assessed - 17    NE COLONOSCOPY FLX DX W/COLLJ SPEC WHEN PFRMD N/A 3/27/2019    Procedure: COLONOSCOPY;  Surgeon: Gucci Nur MD;  Location: [facility]; Service: Gastroenterology     Social History     Substance and Sexual Activity   Alcohol Use No     Social History     Substance and Sexual Activity   Drug Use No     Social History     Tobacco Use   Smoking Status Former Smoker    Packs/day: 0 50    Years: 30 00    Pack years: 15 00    Types: Cigarettes    Quit date: 1    Years since quittin 5   Smokeless Tobacco Never Used   Tobacco Comment         Family History   Adopted: Yes   Problem Relation Age of Onset    Diabetes Family     No Known Problems Mother        Allergies:   Allergies   Allergen Reactions    Simvastatin Rash    Atorvastatin Itching     Other reaction(s): Itching    Levofloxacin GI Intolerance     Other reaction(s): GI Upset    Pravastatin Itching     Other reaction(s): Itching       Medications:     Current Outpatient Medications:     albuterol (Ventolin HFA) 90 mcg/act inhaler, Inhale 2 puffs every 6 (six) hours as needed for wheezing, Disp: 18 g, Rfl: 1    ascorbic acid (VITAMIN C) 500 MG tablet, Take 500 mg by mouth daily, Disp: , Rfl:     aspirin (ECOTRIN) 325 mg EC tablet, Take 1 tablet by mouth daily, Disp: , Rfl:     budesonide-formoterol (SYMBICORT) 160-4 5 mcg/act inhaler, Inhale 2 puffs 2 (two) times a day Rinse mouth after use , Disp: 3 Inhaler, Rfl: 3    diazepam (VALIUM) 5 mg tablet, Take 1 tablet (5 mg total) by mouth every 8 (eight) hours as needed for anxiety (anxiety), Disp: 90 tablet, Rfl: 1    diltiazem (CARDIZEM CD) 240 mg 24 hr capsule, Take 1 capsule (240 mg total) by mouth daily, Disp: 90 capsule, Rfl: 0    ezetimibe (ZETIA) 10 mg tablet, TAKE 1 TABLET BY MOUTH EVERY DAY, Disp: 90 tablet, Rfl: 3    glucose blood test strip, Use 1 each 2 (two) times a day Use as instructed, Disp: 200 each, Rfl: 1    guaiFENesin (ROBITUSSIN) 100 MG/5ML oral liquid, Take 100 mg by mouth daily at bedtime , Disp: , Rfl:     hydrocortisone (ANUSOL-HC) 2 5 % rectal cream, INSERT INTO THE RECTUM TWO TIMES A DAY, Disp: 30 g, Rfl: 4    insulin isophane (HumuLIN N KwikPen) 100 units/mL injection pen, INJECT 10 UNITS IN THE MORNING AND 10 UNITS IN THE EVENING FOR DIABETES - DISPENSE RELION PENS, Disp: 15 mL, Rfl: 1    Insulin Pen Needle (B-D UF III MINI PEN NEEDLES) 31G X 5 MM MISC, by Does not apply route daily USE WITH BASAGLAR, Disp: 100 each, Rfl: 5    levothyroxine 50 mcg tablet, TAKE 1 TABLET BY MOUTH EVERY DAY, Disp: 90 tablet, Rfl: 1    losartan (COZAAR) 50 mg tablet, TAKE 1 TABLET BY MOUTH EVERY DAY, Disp: 90 tablet, Rfl: 3    metFORMIN (GLUCOPHAGE-XR) 500 mg 24 hr tablet, Take 2 tablets (1,000 mg total) by mouth daily with breakfast For diabetes, Disp: 360 tablet, Rfl: 1    Omega-3 Fatty Acids (FISH OIL) 1,000 mg, Take 1 capsule by mouth daily, Disp: , Rfl:     OneTouch Delica Lancets 47Q MISC, Use twice daily, Disp: 200 each, Rfl: 1    VITAMIN B COMPLEX-C PO, Take 1 tablet by mouth daily, Disp: , Rfl:     sildenafil (REVATIO) 20 mg tablet, TAKE 1 TABLET BY MOUTH EVERY DAY AS NEEDED (Patient not taking: Reported on 2/26/2021), Disp: 90 tablet, Rfl: 1    Current Facility-Administered Medications:     albuterol (ACCUNEB) nebulizer solution 1 25 mg, 1 25 mg, Nebulization, Q6H PRN, Wilfredo Yepez MD  albuterol, 1 25 mg, Q6H PRN        Wt Readings from Last 3 Encounters:   06/24/21 50 7 kg (111 lb 12 8 oz)   06/15/21 47 1 kg (103 lb 12 8 oz)   04/26/21 51 7 kg (114 lb)     Temp Readings from Last 3 Encounters:   04/26/21 (!) 97 3 °F (36 3 °C) (Tympanic)   02/26/21 (!) 96 6 °F (35 9 °C) (Temporal)   11/16/20 98 5 °F (36 9 °C) (Temporal)     BP Readings from Last 3 Encounters:   06/24/21 122/52   06/15/21 122/68   04/26/21 118/60 Pulse Readings from Last 3 Encounters:   21 70   06/15/21 74   21 84         Physical Exam  HENT:      Head: Atraumatic  Mouth/Throat:      Mouth: Mucous membranes are moist    Eyes:      Extraocular Movements: Extraocular movements intact  Cardiovascular:      Rate and Rhythm: Normal rate and regular rhythm  Heart sounds: Murmur heard  Systolic murmur is present with a grade of 2/6  Pulmonary:      Effort: Pulmonary effort is normal       Breath sounds: Normal breath sounds  Abdominal:      General: Abdomen is flat  Musculoskeletal:         General: Normal range of motion  Cervical back: Normal range of motion  Skin:     General: Skin is warm  Neurological:      General: No focal deficit present  Mental Status: He is alert and oriented to person, place, and time     Psychiatric:         Mood and Affect: Mood normal          Behavior: Behavior normal            Laboratory Studies:  CMP:  Lab Results   Component Value Date     (L) 2014    K 4 5 2021    CL 99 (L) 2021    CO2 31 2021    ANIONGAP 7 2014    BUN 28 (H) 2021    CREATININE 0 91 2021    GLUCOSE 112 2014    AST 20 2021    ALT 47 2021    BILITOT 0 3 2014    EGFR 84 2021       Lipid Profile:   No results found for: CHOL  Lab Results   Component Value Date    HDL 98 2021     Lab Results   Component Value Date    LDLCALC 99 2021     Lab Results   Component Value Date    TRIG 123 2021       Cardiac testing:     Results for orders placed during the hospital encounter of 17    Echo complete with contrast if indicated    Narrative  [place] [address], [address] [facility]  [phone]    Transthoracic Echocardiogram  2D, M-mode, Doppler, and Color Doppler    Study date:  28-Sep-2017    Patient: Dave Crawford  MR number: [id number]  Account number: [de-identified]  : 1950  Age: 74 years  Gender: Male  Status: Outpatient  Location: [facility] [place]  Height: 64 in  Weight: 112 9 lb  BP: 120/ 70 mmHg    Indications: Cardiac murmur    Diagnoses: R01 1 - Cardiac murmur, unspecified    Sonographer:  ADELAIDA Anton, RDCS  Referring Physician:  Isak Macias DO  Group:  [facility]  Interpreting Physician:  Marlene Rivera MD    SUMMARY    LEFT VENTRICLE:  Systolic function was normal  Ejection fraction was estimated to be 60 %  There were no regional wall motion abnormalities  RIGHT VENTRICLE:  The size was normal   Systolic function was mildly reduced  MITRAL VALVE:  There was mild regurgitation  TRICUSPID VALVE:  There was mild regurgitation  Pulmonary artery systolic pressure was within the normal range  PULMONIC VALVE:  There was trace regurgitation  HISTORY: PRIOR HISTORY: CAD; CABG; Hyperlipidemia; Hypertension; Hypothyroid; Diabetes Mellitus; Post polio syndrome    PROCEDURE: The study was performed in the [facility]  This was a routine study  The transthoracic approach was used  The study included complete 2D imaging, M-mode, complete spectral Doppler, and color Doppler  The  heart rate was 65 bpm, at the start of the study  Images were obtained from the parasternal, apical, subcostal, and suprasternal notch acoustic windows  Image quality was adequate  LEFT VENTRICLE: Size was normal  Systolic function was normal  Ejection fraction was estimated to be 60 %  There were no regional wall motion abnormalities  Wall thickness was normal  DOPPLER: Left ventricular diastolic function parameters  were normal     VENTRICULAR SEPTUM: There was normal motion  There was normal contour  RIGHT VENTRICLE: The size was normal  Systolic function was mildly reduced  Wall thickness was normal     LEFT ATRIUM: Size was normal     RIGHT ATRIUM: Size was normal     MITRAL VALVE: There was mild thickening of the valve  There was normal leaflet separation  DOPPLER: The transmitral velocity was within the normal range  There was no evidence for stenosis  There was mild regurgitation  AORTIC VALVE: The valve was trileaflet  Leaflets exhibited mildly increased thickness and lower normal cuspal separation  DOPPLER: Transaortic velocity was within the normal range  There was no evidence for stenosis  There was no  regurgitation  TRICUSPID VALVE: The valve structure was normal  There was normal leaflet separation  DOPPLER: The transtricuspid velocity was within the normal range  There was no evidence for stenosis  There was mild regurgitation  Pulmonary artery  systolic pressure was within the normal range  PULMONIC VALVE: Leaflets exhibited normal thickness, no calcification, and normal cuspal separation  DOPPLER: The transpulmonic velocity was within the normal range  There was trace regurgitation  PERICARDIUM: There was no pericardial effusion  AORTA: The root exhibited normal size  SYSTEM MEASUREMENT TABLES    2D  %FS: 33 82 %  AV Diam: 3 01 cm  EDV(Teich): 24 47 ml  EF(Cube): 71 01 %  EF(Teich): 64 83 %  ESV(Cube): 5 06 ml  ESV(Teich): 8 61 ml  IVSd: 1 38 cm  LA Area: 16 89 cm2  LA Diam: 4 28 cm  LVEDV MOD A4C: 62 16 ml  LVEF MOD A4C: 61 5 %  LVESV MOD A4C: 23 93 ml  LVIDd: 2 59 cm  LVIDs: 1 72 cm  LVLd A4C: 7 32 cm  LVLs A4C: 5 72 cm  LVPWd: 1 08 cm  RA Area: 9 22 cm2  RV Diam: 3 26 cm  SI(Cube): 8 05 ml/m2  SI(Teich): 10 3 ml/m2  SV MOD A4C: 38 23 ml  SV(Cube): 12 4 ml  SV(Teich): 15 87 ml    CW  TR MaxP 57 mmHg  TR Vmax: 2 14 m/s    MM  TAPSE: 1 4 cm    PW  E': 0 05 m/s  E/E': 25 04  MV A Camilo: 1 34 m/s  MV Dec Nobles: 5 38 m/s2  MV DecT: 251 7 ms  MV E Camilo: 1 35 m/s  MV E/A Ratio: 1 01    [facility] Accredited Echocardiography Laboratory    Prepared and electronically signed by    Monico Gonzalez MD  Signed 28-Sep-2017 17:41:27    No results found for this or any previous visit      No results found for this or any previous visit  No results found for this or any previous visit

## 2021-07-08 ENCOUNTER — CLINICAL SUPPORT (OUTPATIENT)
Dept: INTERNAL MEDICINE CLINIC | Facility: CLINIC | Age: 71
End: 2021-07-08

## 2021-07-08 DIAGNOSIS — E11.9 TYPE 2 DIABETES MELLITUS WITHOUT COMPLICATION, WITHOUT LONG-TERM CURRENT USE OF INSULIN (HCC): ICD-10-CM

## 2021-07-08 DIAGNOSIS — E11.8 TYPE 2 DIABETES MELLITUS WITH COMPLICATION (HCC): ICD-10-CM

## 2021-07-08 DIAGNOSIS — E03.9 HYPOTHYROIDISM, UNSPECIFIED TYPE: ICD-10-CM

## 2021-07-08 RX ORDER — LEVOTHYROXINE SODIUM 0.05 MG/1
TABLET ORAL
Qty: 90 TABLET | Refills: 1 | Status: SHIPPED | OUTPATIENT
Start: 2021-07-08

## 2021-07-08 RX ORDER — METFORMIN HYDROCHLORIDE 500 MG/1
TABLET, EXTENDED RELEASE ORAL
Qty: 360 TABLET | Refills: 1 | Status: SHIPPED | OUTPATIENT
Start: 2021-07-08 | End: 2021-12-18

## 2021-07-08 RX ORDER — INSULIN HUMAN 100 [IU]/ML
INJECTION, SUSPENSION SUBCUTANEOUS
Qty: 15 ML | Refills: 1 | Status: SHIPPED | OUTPATIENT
Start: 2021-07-08 | End: 2021-07-27

## 2021-07-08 NOTE — PROGRESS NOTES
[address] [facility]  [place], PharmD, BCACP      Reason for visit: Appointment with 65y o  year old for management of T2DM  This virtual check-in was done via phone  Encounter provider: [place], Pharmacist    Patient agrees to participate in a virtual check in via telephone or video visit instead of presenting to the office to address urgent/immediate medical needs  After connecting through telephone, the patient was identified by name and date of birth  Emoryyg Ashton was informed that this was a telemedicine visit and that the exam was being conducted confidentially over secure lines  My office door was closed  No one else was in the room  [place] P[place]co acknowledged consent and understanding of privacy and security of the telemedicine visit  I informed the patient that I have reviewed He record in Epic and presented the opportunity for He to ask any questions regarding the visit today  The patient agreed to participate  Current DM Regimen:   Metformin XR   Insulin NPH      ASSESSMENT/PLAN                                                                                     1  Type 2 diabetes: goal A1c < 7 5% based on ADA guidelines; could also consider < 8%  Most recent A1c slightly above goal but patient was started on steroid, due to be updated  SMBG readings avg on lower end; x1 episode of true hypoglycemia however few daytime readings to review  Currently on max tolerated metformin dose  BMP acceptable but with hypoglycemia   No serum B12 while on metformin      Duration: > 20 years  Microvascular complications: microalbuminuria  Macrovascular complications: CABG  (Yes ) Aspirin (No) Statin, allergy x3 statins (Yes ) ACEI/ARB  Eye Exam:  11/2018   Foot Exam: 10/2020     Most recent labs and diabetes goals discussed with patient in clinic today   MEDICATIONS: If PCP is in agreeance, will have patient continue metformin 1gm/day and reduce insulin NPH to 8 units BID  o Rx updated per CPA   SMBx/day      Follow-Up: encouraged patient to establish with PCP in [facility]  _x_ Home Monitoring Records, BG      SUBJECTIVE                                                                                                              1  Medication Adherence: Medication list reviewed with patient, reports the following discrepancies/problems:   Insulin NPH: taking 10 units BID   Metformin: only able to tolerate 1000mg/day as he experienced significant GI ADR     2  Medication Efficacy:  Hypoglycemia history: x1 SMBG readings < 70mg/dL since last appt   Unsymptomatic hypoglycemia: No, reports hypoglycemia s/sx x0 since last appt    3  Lifestyle: has been very busy during the day packing  Will be moving to [place] in the beginning of August  Eats relatively small portion sizes      Breakfast: oatmeal + sunflower seeds + honey  Lunch: cake + coffee  Supper: 1 bowl rice + protein    OBJECTIVE                                                                                                      FBG Average: 94 mg/dl ( mg/dl), n=12, 0/12= < 70 mg/dl   HS BG Average: 93 mg/dl ( mg/dl), n=2, 1/2= < 70 mg/dl       Pertinent Lab Data:  *was fasting for labs*  Lab Results   Component Value Date    SODIUM 136 2021    K 4 5 2021    CL 99 (L) 2021    CO2 31 2021    BUN 28 (H) 2021    CREATININE 0 91 2021    GLUC 95 2019    CALCIUM 9 2 2021     Lab Results   Component Value Date    HGBA1C 7 8 (H) 2021     No results found for: [id number]    Microalbumin/Creatinine: 94 (2021)    Demographics  Interaction Method: Phone  Type of Intervention: Follow-Up    Topic(s) Addressed  Diabetes    Intervention(s) Made    Pharmacologic:      Medication Adjustment - Dose or Frequency: insulin    Non-Pharmacologic:  Adherence Addressed    Other    Tool(s) Used  Not Applicable    Time Spent:   Time Spent in Direct Patient Care: 20 minutes    Time Spent in Care Coordination: 15 minutes    Recommendation(s) Accepted by the Patient/Caregiver:  All Accepted

## 2021-07-27 ENCOUNTER — TELEPHONE (OUTPATIENT)
Dept: INTERNAL MEDICINE CLINIC | Facility: CLINIC | Age: 71
End: 2021-07-27

## 2021-07-27 DIAGNOSIS — E11.8 TYPE 2 DIABETES MELLITUS WITH COMPLICATION (HCC): Primary | ICD-10-CM

## 2021-08-08 DIAGNOSIS — R05.9 COUGH: ICD-10-CM

## 2021-08-09 RX ORDER — ALBUTEROL SULFATE 90 UG/1
AEROSOL, METERED RESPIRATORY (INHALATION)
Qty: 1 G | Refills: 1 | Status: SHIPPED | OUTPATIENT
Start: 2021-08-09 | End: 2021-10-05

## 2021-08-11 DIAGNOSIS — I10 ESSENTIAL HYPERTENSION: ICD-10-CM

## 2021-08-12 RX ORDER — DILTIAZEM HYDROCHLORIDE 240 MG/1
CAPSULE, COATED, EXTENDED RELEASE ORAL
Qty: 90 CAPSULE | Refills: 3 | Status: SHIPPED | OUTPATIENT
Start: 2021-08-12

## 2021-09-12 DIAGNOSIS — I10 HTN (HYPERTENSION): ICD-10-CM

## 2021-09-12 RX ORDER — LOSARTAN POTASSIUM 50 MG/1
TABLET ORAL
Qty: 90 TABLET | Refills: 3 | Status: SHIPPED | OUTPATIENT
Start: 2021-09-12

## 2021-10-02 DIAGNOSIS — R05.9 COUGH: ICD-10-CM

## 2021-10-05 RX ORDER — ALBUTEROL SULFATE 90 UG/1
AEROSOL, METERED RESPIRATORY (INHALATION)
Qty: 1 G | Refills: 3 | Status: SHIPPED | OUTPATIENT
Start: 2021-10-05

## 2021-12-05 DIAGNOSIS — K62.5 RECTAL BLEEDING: ICD-10-CM

## 2021-12-06 RX ORDER — HYDROCORTISONE 25 MG/G
CREAM TOPICAL
Qty: 30 G | Refills: 4 | Status: SHIPPED | OUTPATIENT
Start: 2021-12-06

## 2021-12-23 ENCOUNTER — PATIENT OUTREACH (OUTPATIENT)
Dept: CASE MANAGEMENT | Facility: OTHER | Age: 71
End: 2021-12-23

## 2022-03-14 DIAGNOSIS — E78.5 DYSLIPIDEMIA: ICD-10-CM

## 2022-03-15 RX ORDER — EZETIMIBE 10 MG/1
10 TABLET ORAL DAILY
Qty: 90 TABLET | Refills: 3 | Status: CANCELLED | OUTPATIENT
Start: 2022-03-15

## 2022-03-17 ENCOUNTER — TELEPHONE (OUTPATIENT)
Dept: CARDIOLOGY CLINIC | Facility: CLINIC | Age: 72
End: 2022-03-17

## 2022-03-17 NOTE — TELEPHONE ENCOUNTER
Reached out to pt re [facility]  He has moved to [facility] permanently and has a PCP there who is prescribing his medications  He sends you his thanks for your care and states you don't need to take care of refills in the future

## 2022-08-26 DIAGNOSIS — I10 HTN (HYPERTENSION): ICD-10-CM

## 2022-08-31 NOTE — TELEPHONE ENCOUNTER
Called pt 3 times to see if pt see cardiologist in [place]     LVMTCB-08/26/2022  LVMTCB-08/29/2022  LVMTCB-08/31/2022

## 2022-09-02 RX ORDER — LOSARTAN POTASSIUM 50 MG/1
TABLET ORAL
Qty: 90 TABLET | Refills: 0 | OUTPATIENT
Start: 2022-09-02

## 2023-03-14 ENCOUNTER — TELEPHONE (OUTPATIENT)
Dept: INTERNAL MEDICINE CLINIC | Facility: CLINIC | Age: 73
End: 2023-03-14

## 2023-03-14 NOTE — TELEPHONE ENCOUNTER
3/14 LM to CB    The patient has not been seen recently  Please confirm if the patient is going to continue with our practice and schedule an appointment   Thank you

## 2023-05-31 ENCOUNTER — PROBLEM (OUTPATIENT)
Dept: URBAN - METROPOLITAN AREA CLINIC 70 | Facility: CLINIC | Age: 73
End: 2023-05-31

## 2023-05-31 DIAGNOSIS — H35.62: ICD-10-CM

## 2023-05-31 PROCEDURE — 92012 INTRM OPH EXAM EST PATIENT: CPT

## 2023-05-31 ASSESSMENT — TONOMETRY
OD_IOP_MMHG: 17
OS_IOP_MMHG: 17

## 2023-05-31 ASSESSMENT — VISUAL ACUITY
OD_CC: 20/20-1
OS_CC: 20/30-1

## 2023-06-21 ENCOUNTER — CONSULTATION (OUTPATIENT)
Dept: URBAN - METROPOLITAN AREA CLINIC 94 | Facility: CLINIC | Age: 73
End: 2023-06-21

## 2023-06-21 DIAGNOSIS — H04.123: ICD-10-CM

## 2023-06-21 DIAGNOSIS — H34.8322: ICD-10-CM

## 2023-06-21 DIAGNOSIS — H25.9: ICD-10-CM

## 2023-06-21 PROCEDURE — 92134 CPTRZ OPH DX IMG PST SGM RTA: CPT

## 2023-06-21 PROCEDURE — 92014 COMPRE OPH EXAM EST PT 1/>: CPT

## 2023-06-21 PROCEDURE — 92202 OPSCPY EXTND ON/MAC DRAW: CPT

## 2023-06-21 ASSESSMENT — TONOMETRY
OD_IOP_MMHG: 16
OS_IOP_MMHG: 16

## 2023-06-21 ASSESSMENT — VISUAL ACUITY
OS_CC: 20/50
OD_CC: 20/40

## 2023-07-11 ENCOUNTER — DIAGNOSTICS ONLY (OUTPATIENT)
Dept: URBAN - METROPOLITAN AREA CLINIC 94 | Facility: CLINIC | Age: 73
End: 2023-07-11

## 2023-07-11 DIAGNOSIS — H34.8322: ICD-10-CM

## 2023-07-11 PROCEDURE — 92235 FLUORESCEIN ANGRPH MLTIFRAME: CPT

## 2023-07-11 ASSESSMENT — VISUAL ACUITY
OS_CC: 20/40
OD_CC: 20/40-1

## 2023-10-04 ENCOUNTER — ESTABLISHED COMPREHENSIVE EXAM (OUTPATIENT)
Dept: URBAN - METROPOLITAN AREA CLINIC 94 | Facility: CLINIC | Age: 73
End: 2023-10-04

## 2023-10-04 DIAGNOSIS — H34.8322: ICD-10-CM

## 2023-10-04 DIAGNOSIS — H04.123: ICD-10-CM

## 2023-10-04 DIAGNOSIS — H25.9: ICD-10-CM

## 2023-10-04 PROCEDURE — 92202 OPSCPY EXTND ON/MAC DRAW: CPT

## 2023-10-04 PROCEDURE — 92014 COMPRE OPH EXAM EST PT 1/>: CPT

## 2023-10-04 PROCEDURE — 92134 CPTRZ OPH DX IMG PST SGM RTA: CPT

## 2023-10-04 ASSESSMENT — TONOMETRY
OS_IOP_MMHG: 15
OD_IOP_MMHG: 13

## 2023-10-04 ASSESSMENT — VISUAL ACUITY
OD_CC: 20/40+1
OS_CC: 20/40

## 2023-12-06 ENCOUNTER — ESTABLISHED COMPREHENSIVE EXAM (OUTPATIENT)
Dept: URBAN - METROPOLITAN AREA CLINIC 94 | Facility: CLINIC | Age: 73
End: 2023-12-06

## 2023-12-06 DIAGNOSIS — H34.8322: ICD-10-CM

## 2023-12-06 PROCEDURE — 92014 COMPRE OPH EXAM EST PT 1/>: CPT

## 2023-12-06 PROCEDURE — 92202 OPSCPY EXTND ON/MAC DRAW: CPT

## 2023-12-06 PROCEDURE — 92134 CPTRZ OPH DX IMG PST SGM RTA: CPT

## 2023-12-06 ASSESSMENT — TONOMETRY
OD_IOP_MMHG: 15
OS_IOP_MMHG: 16

## 2023-12-06 ASSESSMENT — VISUAL ACUITY
OS_CC: 20/30-1
OD_CC: 20/30

## 2024-06-24 ENCOUNTER — COMPLETE EYE EXAM (OUTPATIENT)
Dept: URBAN - METROPOLITAN AREA CLINIC 70 | Facility: CLINIC | Age: 74
End: 2024-06-24

## 2024-06-24 DIAGNOSIS — H34.8322: ICD-10-CM

## 2024-06-24 PROCEDURE — 92202 OPSCPY EXTND ON/MAC DRAW: CPT

## 2024-06-24 PROCEDURE — 92134 CPTRZ OPH DX IMG PST SGM RTA: CPT

## 2024-06-24 PROCEDURE — 99214 OFFICE O/P EST MOD 30 MIN: CPT

## 2024-06-24 ASSESSMENT — TONOMETRY
OD_IOP_MMHG: 14
OS_IOP_MMHG: 14

## 2024-06-24 ASSESSMENT — VISUAL ACUITY
OD_CC: 20/30-2
OS_CC: 20/40

## 2025-01-07 NOTE — TELEPHONE ENCOUNTER
Patient called the prescription line asking for a refill of this medication      Next OV-10/7 07-Jan-2025 13:04